# Patient Record
Sex: FEMALE | Race: WHITE | NOT HISPANIC OR LATINO | Employment: FULL TIME | URBAN - METROPOLITAN AREA
[De-identification: names, ages, dates, MRNs, and addresses within clinical notes are randomized per-mention and may not be internally consistent; named-entity substitution may affect disease eponyms.]

---

## 2017-03-16 ENCOUNTER — OFFICE VISIT (OUTPATIENT)
Dept: FAMILY MEDICINE | Facility: CLINIC | Age: 42
End: 2017-03-16
Payer: COMMERCIAL

## 2017-03-16 VITALS
OXYGEN SATURATION: 99 % | HEART RATE: 101 BPM | RESPIRATION RATE: 16 BRPM | SYSTOLIC BLOOD PRESSURE: 119 MMHG | HEIGHT: 63 IN | WEIGHT: 182 LBS | BODY MASS INDEX: 32.25 KG/M2 | DIASTOLIC BLOOD PRESSURE: 72 MMHG | TEMPERATURE: 98.4 F

## 2017-03-16 DIAGNOSIS — R07.0 THROAT PAIN: Primary | ICD-10-CM

## 2017-03-16 LAB
DEPRECATED S PYO AG THROAT QL EIA: NORMAL
MICRO REPORT STATUS: NORMAL
SPECIMEN SOURCE: NORMAL

## 2017-03-16 PROCEDURE — 87880 STREP A ASSAY W/OPTIC: CPT | Performed by: PHYSICIAN ASSISTANT

## 2017-03-16 PROCEDURE — 99213 OFFICE O/P EST LOW 20 MIN: CPT | Performed by: PHYSICIAN ASSISTANT

## 2017-03-16 PROCEDURE — 87081 CULTURE SCREEN ONLY: CPT | Performed by: PHYSICIAN ASSISTANT

## 2017-03-16 NOTE — NURSING NOTE
"Chief Complaint   Patient presents with     Ear Problem     Pharyngitis       Initial /72 (BP Location: Right arm, Patient Position: Chair, Cuff Size: Adult Large)  Pulse 101  Temp 98.4  F (36.9  C) (Oral)  Resp 16  Ht 5' 2.75\" (1.594 m)  Wt 182 lb (82.6 kg)  LMP 03/15/2017  SpO2 99%  Breastfeeding? No  BMI 32.5 kg/m2 Estimated body mass index is 32.5 kg/(m^2) as calculated from the following:    Height as of this encounter: 5' 2.75\" (1.594 m).    Weight as of this encounter: 182 lb (82.6 kg).  Medication Reconciliation: complete Lucinda Bartlett MA  Health Maintenance has been reviewed.       "

## 2017-03-16 NOTE — PROGRESS NOTES
SUBJECTIVE:                                                    Damaris Sarmiento is a 41 year old female who presents to clinic today for the following health issues:      Acute Illness   Acute illness concerns: Sore throat/ear pain  Onset: about 2-3 days for ears throat last night    Fever: no    Chills/Sweats: YES- chills    Headache (location?): YES    Sinus Pressure:YES    Conjunctivitis:  no    Ear Pain: YES: bilateral    Rhinorrhea: YES    Congestion: YES    Sore Throat: YES     Cough: no    Wheeze: YES    Decreased Appetite: no    Nausea: no    Vomiting: no    Diarrhea:  no    Dysuria/Freq.: no    Fatigue/Achiness: YES    Sick/Strep Exposure: YES     Therapies Tried and outcome:           Problem list and histories reviewed & adjusted, as indicated.  Additional history: as documented    Patient Active Problem List   Diagnosis     CARDIOVASCULAR SCREENING; LDL GOAL LESS THAN 160     GERD (gastroesophageal reflux disease)     Antiphospholipid antibody syndrome (H)     Past Surgical History   Procedure Laterality Date     C nonspecific procedure  12/2000     resection of first rib     C nonspecific procedure  1992     left foot surgery     Endoscopic sinus surgery  7/16/2014     Procedure: ENDOSCOPIC SINUS SURGERY;  Surgeon: Suzi Vieyra MD;  Location: [facility]     Turbinectomy  7/16/2014     Procedure: TURBINECTOMY;  Surgeon: Suzi Vieyra MD;  Location: [facility]     Orthopedic surgery         Social History   Substance Use Topics     Smoking status: Never Smoker     Smokeless tobacco: Never Used     Alcohol use 0.0 oz/week     0 Standard drinks or equivalent per week      Comment: rare     Family History   Problem Relation Age of Onset     CANCER Maternal Grandmother      lung     Cancer - colorectal Paternal Grandfather      Prostate Cancer Paternal Grandfather      Allergies Sister      EYE* Maternal Grandfather      glaucoma     Musculoskeletal Disorder Father      Paget's disease      Musculoskeletal  "Disorder Paternal Uncle      Paget's disease           Reviewed and updated as needed this visit by clinical staff  Tobacco  Allergies  Med Hx  Surg Hx  Fam Hx  Soc Hx      Reviewed and updated as needed this visit by Provider         ROS:  Constitutional, HEENT, cardiovascular, pulmonary, gi and gu systems are negative, except as otherwise noted.    OBJECTIVE:                                                    /72 (BP Location: Right arm, Patient Position: Chair, Cuff Size: Adult Large)  Pulse 101  Temp 98.4  F (36.9  C) (Oral)  Resp 16  Ht 5' 2.75\" (1.594 m)  Wt 182 lb (82.6 kg)  LMP 03/15/2017  SpO2 99%  Breastfeeding? No  BMI 32.5 kg/m2  Body mass index is 32.5 kg/(m^2).  GENERAL APPEARANCE: healthy, alert and no distress  HENT: ear canals and TM's normal and oral mucous membranes moist, posterior pharynx erythematous  RESP: lungs clear to auscultation - no rales, rhonchi or wheezes  CV: regular rates and rhythm, normal S1 S2, no S3 or S4 and no murmur, click or rub    Diagnostic Test Results:  Results for orders placed or performed in visit on 03/16/17 (from the past 24 hour(s))   Strep, Rapid Screen   Result Value Ref Range    Specimen Description Throat     Rapid Strep A Screen       NEGATIVE: No Group A streptococcal antigen detected by immunoassay, await   culture report.      Micro Report Status FINAL 03/16/2017         ASSESSMENT/PLAN:                                                            1. Throat pain  Supportive cares. Will treat given symptoms.   F/u as needed   - Strep, Rapid Screen  - Beta strep group A culture  - amoxicillin-clavulanate (AUGMENTIN) 875-125 MG per tablet; Take 1 tablet by mouth 2 times daily  Dispense: 20 tablet; Refill: 0        Rosio Malcolm PA-C, SILVIA  [facility]"

## 2017-03-16 NOTE — MR AVS SNAPSHOT
"              After Visit Summary   3/16/2017    Damaris Sarmiento    MRN: [id number]           Patient Information     Date Of Birth          1975        Visit Information        Provider Department      3/16/2017 3:00 PM Rosio Malcolm PA-C [facility]        Today's Diagnoses     Throat pain    -  1       Follow-ups after your visit        Who to contact     If you have questions or need follow up information about today's clinic visit or your schedule please contact [facility] directly at [phone].  Normal or non-critical lab and imaging results will be communicated to you by Medikidzhart, letter or phone within 4 business days after the clinic has received the results. If you do not hear from us within 7 days, please contact the clinic through Hiddenbedt or phone. If you have a critical or abnormal lab result, we will notify you by phone as soon as possible.  Submit refill requests through Singularu or call your pharmacy and they will forward the refill request to us. Please allow 3 business days for your refill to be completed.          Additional Information About Your Visit        MyChart Information     Singularu gives you secure access to your electronic health record. If you see a primary care provider, you can also send messages to your care team and make appointments. If you have questions, please call your primary care clinic.  If you do not have a primary care provider, please call [phone] and they will assist you.        Care EveryWhere ID     This is your Care EveryWhere ID. This could be used by other organizations to access your [place] medical records  [id number]        Your Vitals Were     Pulse Temperature Respirations Height Last Period Pulse Oximetry    101 98.4  F (36.9  C) (Oral) 16 5' 2.75\" (1.594 m) 03/15/2017 99%    Breastfeeding? BMI (Body Mass Index)                No 32.5 kg/m2           Blood Pressure from Last 3 Encounters:   03/16/17 " 119/72   10/28/16 149/89   08/26/16 102/64    Weight from Last 3 Encounters:   03/16/17 182 lb (82.6 kg)   10/28/16 184 lb (83.5 kg)   08/26/16 189 lb (85.7 kg)              We Performed the Following     Beta strep group A culture     Strep, Rapid Screen          Today's Medication Changes          These changes are accurate as of: 3/16/17  3:47 PM.  If you have any questions, ask your nurse or doctor.               Start taking these medicines.        Dose/Directions    amoxicillin-clavulanate 875-125 MG per tablet   Commonly known as:  AUGMENTIN   Used for:  Throat pain   Started by:  Rosio Malcolm PA-C        Dose:  1 tablet   Take 1 tablet by mouth 2 times daily   Quantity:  20 tablet   Refills:  0            Where to get your medicines      These medications were sent to [facility] [address] [place] [address] AT [address] [address]    Hours:  24-hours Phone:  [phone]     amoxicillin-clavulanate 875-125 MG per tablet                Primary Care Provider Office Phone # Fax #    Reymundo Laureano [phone]       [address] [address]        Thank you!     Thank you for choosing [facility]  for your care. Our goal is always to provide you with excellent care. Hearing back from our patients is one way we can continue to improve our services. Please take a few minutes to complete the written survey that you may receive in the mail after your visit with us. Thank you!             Your Updated Medication List - Protect others around you: Learn how to safely use, store and throw away your medicines at www.disposemymeds.org.          This list is accurate as of: 3/16/17  3:47 PM.  Always use your most recent med list.                   Brand Name Dispense Instructions for use    ALAWAY 0.025 % Soln ophthalmic solution   Generic drug:  ketotifen       1 drop every 6 hours as needed.       amoxicillin-clavulanate 875-125 MG per tablet    AUGMENTIN    20 tablet    Take 1 tablet by mouth 2 times daily       ascorbic acid 500 MG tablet    VITAMIN C     Take  by mouth daily.       budesonide 32 MCG/ACT spray    RINOCORT AQUA     Spray 1 spray into both nostrils daily       CITRACAL OR          CLARITIN 10 MG tablet   Generic drug:  loratadine      1 TABLET DAILY       gentamicin 0.008% in 1000ml 0.9% NaCl Soln nasal irrigation    GARAMYCIN         PRENATAL VIT-FE SULFATE-FA-DHA PO          SUDAFED PO          vitamin D 2000 UNITS tablet      Take 1 tablet by mouth daily.

## 2017-03-18 LAB
BACTERIA SPEC CULT: NORMAL
MICRO REPORT STATUS: NORMAL
SPECIMEN SOURCE: NORMAL

## 2017-07-28 ENCOUNTER — TRANSFERRED RECORDS (OUTPATIENT)
Dept: HEALTH INFORMATION MANAGEMENT | Facility: CLINIC | Age: 42
End: 2017-07-28

## 2017-08-28 ENCOUNTER — TRANSFERRED RECORDS (OUTPATIENT)
Dept: HEALTH INFORMATION MANAGEMENT | Facility: CLINIC | Age: 42
End: 2017-08-28

## 2017-09-20 ENCOUNTER — OFFICE VISIT (OUTPATIENT)
Dept: OBGYN | Facility: CLINIC | Age: 42
End: 2017-09-20
Payer: COMMERCIAL

## 2017-09-20 VITALS
HEART RATE: 76 BPM | SYSTOLIC BLOOD PRESSURE: 100 MMHG | HEIGHT: 63 IN | WEIGHT: 181.3 LBS | BODY MASS INDEX: 32.12 KG/M2 | DIASTOLIC BLOOD PRESSURE: 60 MMHG

## 2017-09-20 DIAGNOSIS — Z01.419 ENCOUNTER FOR GYNECOLOGICAL EXAMINATION WITHOUT ABNORMAL FINDING: Primary | ICD-10-CM

## 2017-09-20 DIAGNOSIS — Z13.29 THYROID DISORDER SCREEN: ICD-10-CM

## 2017-09-20 DIAGNOSIS — E28.2 PCOS (POLYCYSTIC OVARIAN SYNDROME): ICD-10-CM

## 2017-09-20 LAB — CORTIS SERPL-MCNC: 4.1 UG/DL (ref 4–22)

## 2017-09-20 PROCEDURE — 82533 TOTAL CORTISOL: CPT | Performed by: OBSTETRICS & GYNECOLOGY

## 2017-09-20 PROCEDURE — 82947 ASSAY GLUCOSE BLOOD QUANT: CPT | Performed by: OBSTETRICS & GYNECOLOGY

## 2017-09-20 PROCEDURE — 84443 ASSAY THYROID STIM HORMONE: CPT | Performed by: OBSTETRICS & GYNECOLOGY

## 2017-09-20 PROCEDURE — 99396 PREV VISIT EST AGE 40-64: CPT | Performed by: OBSTETRICS & GYNECOLOGY

## 2017-09-20 PROCEDURE — 80061 LIPID PANEL: CPT | Performed by: OBSTETRICS & GYNECOLOGY

## 2017-09-20 PROCEDURE — 84439 ASSAY OF FREE THYROXINE: CPT | Performed by: OBSTETRICS & GYNECOLOGY

## 2017-09-20 PROCEDURE — 36415 COLL VENOUS BLD VENIPUNCTURE: CPT | Performed by: OBSTETRICS & GYNECOLOGY

## 2017-09-20 RX ORDER — POLYETHYLENE GLYCOL 3350 17 G/17G
1 POWDER, FOR SOLUTION ORAL DAILY
COMMUNITY

## 2017-09-20 NOTE — MR AVS SNAPSHOT
After Visit Summary   9/20/2017    Damaris Sarmiento    MRN: [id number]           Patient Information     Date Of Birth          1975        Visit Information        Provider Department      9/20/2017 10:00 AM Mari Figueroa MD [facility]        Today's Diagnoses     Encounter for gynecological examination without abnormal finding    -  1    PCOS (polycystic ovarian syndrome)          Care Instructions    PREVENTIVE HEALTH RECOMMENDATIONS:     Get a Pap test each year. If you have 3 normal tests in a row, you may have the test every 5 years. You do not need a Pap test if you've had a hysterectomy (removal of uterus) and have not had cancer.    You should be tested each year for STDs (sexually transmitted diseases), if you're at risk.     Ask your doctor if you should have a mammogram.    Have a colonoscopy (test for colon cancer) if someone in your family has had colon cancer or polyps before age 50.     Have a cholesterol test every 5 years.     Have a diabetes test (fasting glucose) after age 45. If you are at risk for diabetes, you should have this test every 3 years.    Vaccines: Get a flu shot each year. Get a tetanus shot every 10 years.     Eat at least 5 servings of fruits and vegetables daily.    Eat whole-grain bread, whole-wheat pasta and brown rice instead of white grains and rice.    For bone health:  Eat calcium-rich foods or take calcium pills (500 to 600 mg) twice a day with food. Also take vitamin D (1000 IUs) each day.     Exercise for at least 150 minutes a week (an average of 30 minutes a day, 5 days of the week). This will help you control your weight and prevent disease.    Limit alcohol to one drink per day.    No smoking.     Wear sunscreen to prevent skin cancer.     See your dentist twice a year for an exam and cleaning.          Follow-ups after your visit        Who to contact     If you have questions or need follow up information about today's  "clinic visit or your schedule please contact [facility] directly at [phone].  Normal or non-critical lab and imaging results will be communicated to you by MyChart, letter or phone within 4 business days after the clinic has received the results. If you do not hear from us within 7 days, please contact the clinic through bLifehart or phone. If you have a critical or abnormal lab result, we will notify you by phone as soon as possible.  Submit refill requests through Bay[employer] Impact or call your pharmacy and they will forward the refill request to us. Please allow 3 business days for your refill to be completed.          Additional Information About Your Visit        bLifeharCallerAds Limited Information     [employer] gives you secure access to your electronic health record. If you see a primary care provider, you can also send messages to your care team and make appointments. If you have questions, please call your primary care clinic.  If you do not have a primary care provider, please call [phone] and they will assist you.        Care EveryWhere ID     This is your Care EveryWhere ID. This could be used by other organizations to access your [place] medical records  [id number]        Your Vitals Were     Pulse Height Last Period Breastfeeding? BMI (Body Mass Index)       76 5' 2.75\" (1.594 m) 09/03/2017 (Approximate) No 32.37 kg/m2        Blood Pressure from Last 3 Encounters:   09/20/17 100/60   03/16/17 119/72   10/28/16 149/89    Weight from Last 3 Encounters:   09/20/17 181 lb 4.8 oz (82.2 kg)   03/16/17 182 lb (82.6 kg)   10/28/16 184 lb (83.5 kg)              We Performed the Following     Cortisol     Glucose     Lipid Profile     T4 FREE     TSH          Today's Medication Changes          These changes are accurate as of: 9/20/17 11:59 PM.  If you have any questions, ask your nurse or doctor.               Stop taking these medicines if you haven't already. Please contact your care team if you have " questions.     PRENATAL VIT-FE SULFATE-FA-DHA PO   Stopped by:  Mari Figueroa MD                    Primary Care Provider Office Phone # Fax #    Reymundo Laureano [phone] [address]  [facility] [address]        Equal Access to Services     [facility]: Hadii aad ku hadasho Soomaali, waaxda luqadaha, qaybta kaalmada adeegyada, waxay idiin hayaan adececile leung layannickn ah. So [facility] [phone].    ATENCIÓN: Si habla español, tiene a walsh disposición servicios gratuitos de asistencia lingüística. [facility] [phone].    We comply with applicable federal civil rights laws and [place] laws. We do not discriminate on the basis of race, color, national origin, age, disability sex, sexual orientation or gender identity.            Thank you!     Thank you for choosing [facility]  for your care. Our goal is always to provide you with excellent care. Hearing back from our patients is one way we can continue to improve our services. Please take a few minutes to complete the written survey that you may receive in the mail after your visit with us. Thank you!             Your Updated Medication List - Protect others around you: Learn how to safely use, store and throw away your medicines at www.disposemymeds.org.          This list is accurate as of: 9/20/17 11:59 PM.  Always use your most recent med list.                   Brand Name Dispense Instructions for use Diagnosis    ALAWAY 0.025 % Soln ophthalmic solution   Generic drug:  ketotifen      1 drop every 6 hours as needed.        ascorbic acid 500 MG tablet    VITAMIN C     Take  by mouth daily.        budesonide 32 MCG/ACT spray    RINOCORT AQUA     Spray 1 spray into both nostrils daily        CITRUCEL 500 MG Tabs tablet   Generic drug:  methylcellulose      Take 100 mg by mouth daily        CLARITIN 10 MG tablet   Generic drug:  loratadine      1 TABLET DAILY    Allergic rhinitis, cause unspecified       MIRALAX  powder   Generic drug:  polyethylene glycol      Take 1 capful by mouth daily        SUDAFED PO           vitamin D 2000 UNITS tablet      Take 1 tablet by mouth daily.

## 2017-09-20 NOTE — NURSING NOTE
"Chief Complaint   Patient presents with     Gyn Exam   Has papers for [facility] [employer] through employer  Fasting  Flu shot  Body Composition Scale Results    Body Fat : 47.8%  Visceral Fat: 9  BMI: 32.6  Skeletal Muscle: 22.8%      Initial /60 (BP Location: Left arm, Patient Position: Sitting, Cuff Size: Adult Large)  Pulse 76  Ht 5' 2.75\" (1.594 m)  Wt 181 lb 4.8 oz (82.2 kg)  LMP 2017 (Approximate)  Breastfeeding? No  BMI 32.37 kg/m2 Estimated body mass index is 32.37 kg/(m^2) as calculated from the following:    Height as of this encounter: 5' 2.75\" (1.594 m).    Weight as of this encounter: 181 lb 4.8 oz (82.2 kg).  BP completed using cuff size: large        The following HM Due: Vaccinations: flu shot      The following patient reported/Care Every where data was sent to:  P ABSTRACT QUALITY INITIATIVES [[id number]]  NA     patient has appointment for today             "

## 2017-09-20 NOTE — PROGRESS NOTES
HPI: Damaris Sarmiento is a 42 year old female   Obstetric History       T0      L0     SAB0   TAB0   Ectopic0   Multiple0   Live Births0     Patient's last menstrual period was 2017 (approximate). who presents for annual health maintence.      Patient reports severe, intermittent pelvic spasms near the cervix which occur about every other month and typically last 30 seconds.  She has not noticed any aggravating factors or any association with bloating, gas, or other GI issues.  Denies bowel or bladder changes.     Patient states that she was diagnosed with PCOS at [facility].  She states she had symptoms of fatigue, boils, abnormal hair growth, and hair loss.  She had a h/o irregular periods but notes that they have been more regular lately.  Patient states that she has been working on weight loss to improve her symptoms.  She reports 17 pound weight loss with calorie restriction, targeting 1300 Calories/day.       Patient reports regular menses; moderate flow.    Exercise/Eating: Continuing to exercise regularly, walking on the treadmill with interval jogging; strength training 1-2x/wk.     PGYNH: denies history of abnormal paps, denies history of STDs. H/o PCOS.     Past Medical History:   Diagnosis Date     CARDIOVASCULAR SCREENING; LDL GOAL LESS THAN 160 10/31/2010     GERD (gastroesophageal reflux disease) 10/4/2012     Polycystic ovarian syndrome      PONV (postoperative nausea and vomiting)      RW ALLERGIES/IMMUNOLOGY (ABSTRACTED)      Sinusitis      Subclavian vein thrombosis, right (H)      Past Surgical History:   Procedure Laterality Date     C NONSPECIFIC PROCEDURE  2000    resection of first rib     C NONSPECIFIC PROCEDURE      left foot surgery     ENDOSCOPIC SINUS SURGERY  2014    Procedure: ENDOSCOPIC SINUS SURGERY;  Surgeon: Suzi Vieyra MD;  Location: [facility]     ORTHOPEDIC SURGERY       TURBINECTOMY  2014    Procedure: TURBINECTOMY;  Surgeon: Suzi Vieyra  MD [place];  [facility]: [facility]     Family History   Problem Relation Age of Onset     Musculoskeletal Disorder Father      Paget's disease      EYE* Maternal Grandfather      glaucoma     CANCER Maternal Grandmother      lung     Cancer - colorectal Paternal Grandfather      Prostate Cancer Paternal Grandfather      Allergies Sister      Other - See Comments Sister      endometriosis     Musculoskeletal Disorder Paternal Uncle      Paget's disease     Social History     Social History     Marital status:      Spouse name: N/A     Number of children: N/A     Years of education: N/A     Occupational History     Not on file.     Social History Main Topics     Smoking status: Never Smoker     Smokeless tobacco: Never Used     Alcohol use 0.0 oz/week     0 Standard drinks or equivalent per week      Comment: rare     Drug use: No     Sexual activity: Yes     Partners: Male     Birth control/ protection:       Comment: vasectomy     Other Topics Concern     Not on file     Social History Narrative       Review of Systems:   CONSTITUTIONAL: NEGATIVE for fever, chills, change in weight  INTEGUMENTARY/SKIN: NEGATIVE for worrisome rashes, moles or lesions  RESP: NEGATIVE for significant cough or SOB  BREAST: NEGATIVE for masses, tenderness or discharge  CV: NEGATIVE for chest pain, palpitations or peripheral edema  GI: NEGATIVE for abdominal pain, heartburn, or change in bowel habits, nausea, emesis  : negative for dysuria, vaginal discharge and bleeding  MUSCULOSKELETAL: NEGATIVE for significant arthralgias or myalgia  NEURO: NEGATIVE for weakness, dizziness or paresthesias  PSYCHIATRIC: NEGATIVE for changes in mood or affect     This document serves as a record of the services and decisions personally performed and made by Mari Figueroa MD. It was created on her behalf by Suze Justice, a trained medical scribe. The creation of this document is based the provider's statements to the medical scribe.  Suze Justice  "September 20, 2017 11:02 AM       /60 (BP Location: Left arm, Patient Position: Sitting, Cuff Size: Adult Large)  Pulse 76  Ht 1.594 m (5' 2.75\")  Wt 82.2 kg (181 lb 4.8 oz)  LMP 09/03/2017 (Approximate)  Breastfeeding? No  BMI 32.37 kg/m2    Physical exam:  GENERAL APPEARANCE: healthy, alert and no distress  NECK: no adenopathy, no asymmetry, masses, or scars and thyroid normal to palpation  RESP: lungs clear to auscultation - no rales, rhonchi or wheezes  BREAST: no tenderness or nipple discharge and no palpable axillary masses or adenopathy; <1cm lump on left breast at 11 o'clock 6 cm from areola margin; stable  CV: regular rates and rhythm, normal S1 S2, no S3 or S4 and no murmur, click or rub -  ABDOMEN:  soft, nontender, no HSM or masses and bowel sounds normal  PELVIC:   Vulva: normal external female genitalia,   Urethra meatus: normal, non-tender  Vagina: normal vaginal mucosa, rugated, no lesions, normal physiologic discharge.    Cervix: nulliparous cervix, no lesions.    Uterus: Normal contour, size, position; non-tender  Adnexa: No adnexal masses palpated, non-tender  Perineum: normal, no lesions, intact  Anus: normal, no lesions, hemorrhoids          Assessment/Plan:  (Z01.419) Encounter for gynecological examination without abnormal finding  (primary encounter diagnosis)  Comment: Encouraged continuing healthy eating and exercise including strength training.   Plan: Lipid Profile, Glucose, Cortisol, TSH, T4 FREE          (E28.2) PCOS (polycystic ovarian syndrome)  Comment: ultrasound to further evaluate PCOS  Plan: US Pelvic Complete with Transvaginal            PE: reviewed health maintenance including diet, regular exercise and annual exams    The information in this document, created by the medical scribe for me, accurately reflects the services I personally performed and the decisions made by me. I have reviewed and approved this document for accuracy prior to leaving the patient care " area.  9/20/2017 11:02 AM      Mari Figueroa M.D.

## 2017-09-20 NOTE — LETTER
[facility] [address] [place]  [address]  [facility] [address]  [phone]        August 28, 2018    Damaris Sarmiento  [address]  [facility] [address]              Dear Damaris Sarmiento    This is to remind you that your NON-FASTING LAB is due.    You may call our office at [phone] to schedule an appointment.    Please disregard this notice if you have already had your labs drawn or made an appointment.        Sincerely,        Mari Figueroa MD

## 2017-09-20 NOTE — PATIENT INSTRUCTIONS
PREVENTIVE HEALTH RECOMMENDATIONS:     Get a Pap test each year. If you have 3 normal tests in a row, you may have the test every 5 years. You do not need a Pap test if you've had a hysterectomy (removal of uterus) and have not had cancer.    You should be tested each year for STDs (sexually transmitted diseases), if you're at risk.     Ask your doctor if you should have a mammogram.    Have a colonoscopy (test for colon cancer) if someone in your family has had colon cancer or polyps before age 50.     Have a cholesterol test every 5 years.     Have a diabetes test (fasting glucose) after age 45. If you are at risk for diabetes, you should have this test every 3 years.    Vaccines: Get a flu shot each year. Get a tetanus shot every 10 years.     Eat at least 5 servings of fruits and vegetables daily.    Eat whole-grain bread, whole-wheat pasta and brown rice instead of white grains and rice.    For bone health:  Eat calcium-rich foods or take calcium pills (500 to 600 mg) twice a day with food. Also take vitamin D (1000 IUs) each day.     Exercise for at least 150 minutes a week (an average of 30 minutes a day, 5 days of the week). This will help you control your weight and prevent disease.    Limit alcohol to one drink per day.    No smoking.     Wear sunscreen to prevent skin cancer.     See your dentist twice a year for an exam and cleaning.

## 2017-09-20 NOTE — LETTER
[place] [address]  [facility] [place] [id number] [phone]        March 13, 2018    Damaris Sarmiento  [address]  [facility] [address]              Dear Damaris Sarmiento    This is to remind you that your NON-FASTING LAB is due.    You may call our office at [phone] to schedule an appointment.    Please disregard this notice if you have already had your labs drawn or made an appointment.        Sincerely,        Mari Figueroa MD

## 2017-09-21 LAB
CHOLEST SERPL-MCNC: 178 MG/DL
GLUCOSE SERPL-MCNC: 83 MG/DL (ref 70–99)
HDLC SERPL-MCNC: 71 MG/DL
LDLC SERPL CALC-MCNC: 93 MG/DL
NONHDLC SERPL-MCNC: 107 MG/DL
T4 FREE SERPL-MCNC: 2.66 NG/DL (ref 0.76–1.46)
TRIGL SERPL-MCNC: 69 MG/DL
TSH SERPL DL<=0.005 MIU/L-ACNC: 1.16 MU/L (ref 0.4–4)

## 2017-09-22 ENCOUNTER — MYC MEDICAL ADVICE (OUTPATIENT)
Dept: OBGYN | Facility: CLINIC | Age: 42
End: 2017-09-22

## 2017-09-28 ENCOUNTER — MYC MEDICAL ADVICE (OUTPATIENT)
Dept: OBGYN | Facility: CLINIC | Age: 42
End: 2017-09-28

## 2017-09-29 ENCOUNTER — MYC MEDICAL ADVICE (OUTPATIENT)
Dept: OBGYN | Facility: CLINIC | Age: 42
End: 2017-09-29

## 2017-09-29 NOTE — TELEPHONE ENCOUNTER
See GoNogging message.    Your note from results:    Mp Ocampo,     Your lab results indicated an elevated thyroid hormone (free T4), although the screening thyroid test was normal (TSH).    Can you repeat this test for me?   I would like to check a free T3 level as well.       Please let me know if there were any other medication changes that we did not note at your last visit as well.     Please schedule a lab-only appointment and you can have the labs drawn.     Please let me know if you have any questions.     Dr. Carolina BAGLEY RSAE.  [facility]

## 2017-09-29 NOTE — TELEPHONE ENCOUNTER
Please fax a copy of the thyroid labs to Dr. Johnson's office and ask what additional testing is recommended.   If they would like us to order to do at [place], let me know about the specific labs they suggest.   Thank you.

## 2017-09-29 NOTE — TELEPHONE ENCOUNTER
Faxed results.  Added note to let us know of any additional needed labs.    Cely BAGLEY R.N.  [facility]

## 2017-10-03 ENCOUNTER — HOSPITAL ENCOUNTER (OUTPATIENT)
Dept: ULTRASOUND IMAGING | Facility: CLINIC | Age: 42
Discharge: HOME | End: 2017-10-03
Attending: OBSTETRICS & GYNECOLOGY | Admitting: OBSTETRICS & GYNECOLOGY
Payer: COMMERCIAL

## 2017-10-03 DIAGNOSIS — E28.2 PCOS (POLYCYSTIC OVARIAN SYNDROME): ICD-10-CM

## 2017-10-03 PROCEDURE — 76830 TRANSVAGINAL US NON-OB: CPT

## 2018-02-06 ENCOUNTER — APPOINTMENT (OUTPATIENT)
Dept: CT IMAGING | Facility: CLINIC | Age: 43
End: 2018-02-06
Attending: PHYSICIAN ASSISTANT
Payer: COMMERCIAL

## 2018-02-06 ENCOUNTER — HOSPITAL ENCOUNTER (EMERGENCY)
Facility: CLINIC | Age: 43
Discharge: HOME | End: 2018-02-06
Attending: EMERGENCY MEDICINE | Admitting: EMERGENCY MEDICINE
Payer: COMMERCIAL

## 2018-02-06 VITALS
OXYGEN SATURATION: 96 % | RESPIRATION RATE: 17 BRPM | HEART RATE: 100 BPM | HEIGHT: 63 IN | WEIGHT: 189.82 LBS | SYSTOLIC BLOOD PRESSURE: 115 MMHG | BODY MASS INDEX: 33.63 KG/M2 | TEMPERATURE: 98.7 F | DIASTOLIC BLOOD PRESSURE: 71 MMHG

## 2018-02-06 DIAGNOSIS — R07.9 ACUTE CHEST PAIN: ICD-10-CM

## 2018-02-06 LAB
ALBUMIN SERPL-MCNC: 3.6 G/DL (ref 3.4–5)
ALP SERPL-CCNC: 54 U/L (ref 40–150)
ALT SERPL W P-5'-P-CCNC: 26 U/L (ref 0–50)
ANION GAP SERPL CALCULATED.3IONS-SCNC: 8 MMOL/L (ref 3–14)
AST SERPL W P-5'-P-CCNC: 15 U/L (ref 0–45)
BASOPHILS # BLD AUTO: 0.1 10E9/L (ref 0–0.2)
BASOPHILS NFR BLD AUTO: 0.5 %
BILIRUB SERPL-MCNC: 0.3 MG/DL (ref 0.2–1.3)
BUN SERPL-MCNC: 10 MG/DL (ref 7–30)
CALCIUM SERPL-MCNC: 8.6 MG/DL (ref 8.5–10.1)
CHLORIDE SERPL-SCNC: 102 MMOL/L (ref 94–109)
CO2 SERPL-SCNC: 27 MMOL/L (ref 20–32)
CREAT SERPL-MCNC: 0.66 MG/DL (ref 0.52–1.04)
D DIMER PPP FEU-MCNC: 0.3 UG/ML FEU (ref 0–0.5)
DIFFERENTIAL METHOD BLD: ABNORMAL
EOSINOPHIL # BLD AUTO: 0.4 10E9/L (ref 0–0.7)
EOSINOPHIL NFR BLD AUTO: 3.4 %
ERYTHROCYTE [DISTWIDTH] IN BLOOD BY AUTOMATED COUNT: 13.3 % (ref 10–15)
GFR SERPL CREATININE-BSD FRML MDRD: >90 ML/MIN/1.7M2
GLUCOSE SERPL-MCNC: 99 MG/DL (ref 70–99)
HCT VFR BLD AUTO: 36.8 % (ref 35–47)
HGB BLD-MCNC: 12.7 G/DL (ref 11.7–15.7)
IMM GRANULOCYTES # BLD: 0 10E9/L (ref 0–0.4)
IMM GRANULOCYTES NFR BLD: 0.2 %
LIPASE SERPL-CCNC: 119 U/L (ref 73–393)
LYMPHOCYTES # BLD AUTO: 4.7 10E9/L (ref 0.8–5.3)
LYMPHOCYTES NFR BLD AUTO: 36.5 %
MCH RBC QN AUTO: 29.5 PG (ref 26.5–33)
MCHC RBC AUTO-ENTMCNC: 34.5 G/DL (ref 31.5–36.5)
MCV RBC AUTO: 85 FL (ref 78–100)
MONOCYTES # BLD AUTO: 0.9 10E9/L (ref 0–1.3)
MONOCYTES NFR BLD AUTO: 7.1 %
NEUTROPHILS # BLD AUTO: 6.7 10E9/L (ref 1.6–8.3)
NEUTROPHILS NFR BLD AUTO: 52.3 %
NRBC # BLD AUTO: 0 10*3/UL
NRBC BLD AUTO-RTO: 0 /100
PLATELET # BLD AUTO: 330 10E9/L (ref 150–450)
POTASSIUM SERPL-SCNC: 3.4 MMOL/L (ref 3.4–5.3)
PROT SERPL-MCNC: 7.8 G/DL (ref 6.8–8.8)
RBC # BLD AUTO: 4.31 10E12/L (ref 3.8–5.2)
SODIUM SERPL-SCNC: 137 MMOL/L (ref 133–144)
TROPONIN I SERPL-MCNC: <0.015 UG/L (ref 0–0.04)
WBC # BLD AUTO: 12.7 10E9/L (ref 4–11)

## 2018-02-06 PROCEDURE — 85025 COMPLETE CBC W/AUTO DIFF WBC: CPT | Performed by: EMERGENCY MEDICINE

## 2018-02-06 PROCEDURE — 93005 ELECTROCARDIOGRAM TRACING: CPT

## 2018-02-06 PROCEDURE — 85379 FIBRIN DEGRADATION QUANT: CPT | Performed by: EMERGENCY MEDICINE

## 2018-02-06 PROCEDURE — 84484 ASSAY OF TROPONIN QUANT: CPT | Performed by: EMERGENCY MEDICINE

## 2018-02-06 PROCEDURE — 99285 EMERGENCY DEPT VISIT HI MDM: CPT | Mod: 25

## 2018-02-06 PROCEDURE — 80053 COMPREHEN METABOLIC PANEL: CPT | Performed by: EMERGENCY MEDICINE

## 2018-02-06 PROCEDURE — 25000128 H RX IP 250 OP 636: Performed by: EMERGENCY MEDICINE

## 2018-02-06 PROCEDURE — 25000125 ZZHC RX 250: Performed by: EMERGENCY MEDICINE

## 2018-02-06 PROCEDURE — 83690 ASSAY OF LIPASE: CPT | Performed by: EMERGENCY MEDICINE

## 2018-02-06 PROCEDURE — 71260 CT THORAX DX C+: CPT

## 2018-02-06 RX ORDER — IOPAMIDOL 755 MG/ML
70 INJECTION, SOLUTION INTRAVASCULAR ONCE
Status: COMPLETED | OUTPATIENT
Start: 2018-02-06 | End: 2018-02-06

## 2018-02-06 RX ADMIN — SODIUM CHLORIDE, PRESERVATIVE FREE 94 ML: 5 INJECTION INTRAVENOUS at 21:42

## 2018-02-06 RX ADMIN — IOPAMIDOL 70 ML: 755 INJECTION, SOLUTION INTRAVENOUS at 21:42

## 2018-02-06 ASSESSMENT — ENCOUNTER SYMPTOMS
VOMITING: 1
CHEST TIGHTNESS: 1
SHORTNESS OF BREATH: 1

## 2018-02-06 NOTE — ED AVS SNAPSHOT
Emergency Department    [address] [place] [address]    Phone:  [phone]    Fax:  [phone]                                       Damaris Sarmiento   MRN: [id number]    Department:   Emergency Department   Date of Visit:  2/6/2018           After Visit Summary Signature Page     I have received my discharge instructions, and my questions have been answered. I have discussed any challenges I see with this plan with the nurse or doctor.    ..........................................................................................................................................  Patient/Patient Representative Signature      ..........................................................................................................................................  Patient Representative Print Name and Relationship to Patient    ..................................................               ................................................  Date                                            Time    ..........................................................................................................................................  Reviewed by Signature/Title    ...................................................              ..............................................  Date                                                            Time

## 2018-02-06 NOTE — ED AVS SNAPSHOT
Emergency Department    [address] [facility] [address]    Phone:  [phone]    Fax:  [phone]                                       Damaris Sarmiento   MRN: [id number]    Department:   Emergency Department   Date of Visit:  2/6/2018           Patient Information     Date Of Birth          1975        Your diagnoses for this visit were:     Acute chest pain        You were seen by Ruddy Son MD.      Follow-up Information     Follow up with [facility], [facility] In 2 days.    Contact information:    [address] [facility] [id number] [phone]          Follow up with  Emergency Department.    Specialty:  EMERGENCY MEDICINE    Why:  If symptoms worsen    Contact information:    [address] [facility] [address] [phone]        Discharge Instructions       CHEST PAIN  Based on your visit today, the exact cause of your chest pain is not certain. Your condition does not seem serious and your pain does not appear to be coming from your heart. However, sometimes the signs of a serious problem take more time to appear. Therefore, please watch for the warning signs listed below.  HOME CARE:  1. Rest today and avoid strenuous activity.  2. Take any prescribed medicine as directed.  FOLLOW UP with your doctor in 1-3 days.   GET PROMPT MEDICAL ATTENTION if any of the following occur:    A change in the type of pain: if it feels different, becomes more severe, lasts longer, or begins to spread into your shoulder, arm, neck, jaw or back    Shortness of breath or increased pain with breathing    Cough with blood or dark colored sputum (phlegm)    Weakness, dizziness, or fainting    Fever over 101  F (38.3  C)    Swelling, pain or redness in one leg      24 Hour Appointment Hotline       To make an appointment at any [facility], call [phone]). If you don't have a family doctor or clinic, we will help you find one. [facility]  are conveniently located to serve the needs of you and your family.             Review of your medicines      Our records show that you are taking the medicines listed below. If these are incorrect, please call your family doctor or clinic.        Dose / Directions Last dose taken    BABY ASPIRIN PO        Refills:  0        budesonide 32 MCG/ACT spray   Commonly known as:  RINOCORT AQUA   Dose:  1 spray        Spray 1 spray into both nostrils daily   Refills:  0        CITRUCEL 500 MG Tabs tablet   Dose:  100 mg   Generic drug:  methylcellulose        Take 100 mg by mouth daily   Refills:  0        CLARITIN 10 MG tablet   Generic drug:  loratadine        1 TABLET DAILY   Refills:  0        MIRALAX powder   Dose:  1 capful   Generic drug:  polyethylene glycol        Take 1 capful by mouth daily   Refills:  0        SUDAFED PO        Refills:  0                Procedures and tests performed during your visit     CBC with platelets differential    CT Chest Pulmonary Embolism w Contrast    Comprehensive metabolic panel    D dimer quantitative    EKG 12 lead    EKG 12-lead, tracing only    Lipase    Peripheral IV catheter    Troponin I      Orders Needing Specimen Collection     None      Pending Results     No orders found from 2/4/2018 to 2/7/2018.            Pending Culture Results     No orders found from 2/4/2018 to 2/7/2018.            Pending Results Instructions     If you had any lab results that were not finalized at the time of your Discharge, you can call the ED Lab Result RN at [phone]. You will be contacted by this team for any positive Lab results or changes in treatment. The nurses are available 7 days a week from 10A to 6:30P.  You can leave a message 24 hours per day and they will return your call.        Test Results From Your Hospital Stay        2/6/2018  9:08 PM      Component Results     Component Value Ref Range & Units Status    WBC 12.7 (H) 4.0 - 11.0 10e9/L Final    RBC Count 4.31 3.8 -  5.2 10e12/L Final    Hemoglobin 12.7 11.7 - 15.7 g/dL Final    Hematocrit 36.8 35.0 - 47.0 % Final    MCV 85 78 - 100 fl Final    MCH 29.5 26.5 - 33.0 pg Final    MCHC 34.5 31.5 - 36.5 g/dL Final    RDW 13.3 10.0 - 15.0 % Final    Platelet Count 330 150 - 450 10e9/L Final    Diff Method Automated Method  Final    % Neutrophils 52.3 % Final    % Lymphocytes 36.5 % Final    % Monocytes 7.1 % Final    % Eosinophils 3.4 % Final    % Basophils 0.5 % Final    % Immature Granulocytes 0.2 % Final    Nucleated RBCs 0 0 /100 Final    Absolute Neutrophil 6.7 1.6 - 8.3 10e9/L Final    Absolute Lymphocytes 4.7 0.8 - 5.3 10e9/L Final    Absolute Monocytes 0.9 0.0 - 1.3 10e9/L Final    Absolute Eosinophils 0.4 0.0 - 0.7 10e9/L Final    Absolute Basophils 0.1 0.0 - 0.2 10e9/L Final    Abs Immature Granulocytes 0.0 0 - 0.4 10e9/L Final    Absolute Nucleated RBC 0.0  Final         2/6/2018  9:41 PM      Component Results     Component Value Ref Range & Units Status    D Dimer 0.3 0.0 - 0.50 ug/ml FEU Final    This D-dimer assay is intended for use in conjunction with a clinical pretest   probability assessment model to exclude pulmonary embolism (PE) and deep   venous thrombosis (DVT) in outpatients suspected of PE or DVT. The cut-off   value is 0.5 ug/mL FEU.           2/6/2018  9:36 PM      Component Results     Component Value Ref Range & Units Status    Sodium 137 133 - 144 mmol/L Final    Potassium 3.4 3.4 - 5.3 mmol/L Final    Chloride 102 94 - 109 mmol/L Final    Carbon Dioxide 27 20 - 32 mmol/L Final    Anion Gap 8 3 - 14 mmol/L Final    Glucose 99 70 - 99 mg/dL Final    Urea Nitrogen 10 7 - 30 mg/dL Final    Creatinine 0.66 0.52 - 1.04 mg/dL Final    GFR Estimate >90 >60 mL/min/1.7m2 Final    Non  GFR Calc    GFR Estimate If Black >90 >60 mL/min/1.7m2 Final    African American GFR Calc    Calcium 8.6 8.5 - 10.1 mg/dL Final    Bilirubin Total 0.3 0.2 - 1.3 mg/dL Final    Albumin 3.6 3.4 - 5.0 g/dL Final     Protein Total 7.8 6.8 - 8.8 g/dL Final    Alkaline Phosphatase 54 40 - 150 U/L Final    ALT 26 0 - 50 U/L Final    AST 15 0 - 45 U/L Final         2/6/2018  9:31 PM      Component Results     Component Value Ref Range & Units Status    Lipase 119 73 - 393 U/L Final         2/6/2018  9:36 PM      Component Results     Component Value Ref Range & Units Status    Troponin I ES <0.015 0.000 - 0.045 ug/L Final    The 99th percentile for upper reference range is 0.045 ug/L.  Troponin values   in the range of 0.045 - 0.120 ug/L may be associated with risks of adverse   clinical events.           2/6/2018 10:08 PM      Narrative     CT CHEST PULMONARY EMBOLISM W CONTRAST 2/6/2018 9:45 PM     HISTORY: Chest pain, shortness of breath, history of DVT.     COMPARISON: CT chest 4/10/2006.    TECHNIQUE: Thin section axial images are performed from the thoracic  inlet to the lung bases utilizing 70 mL of Isovue 370 IV contrast  without adverse event. Coronal reformatted images are also generated.  Radiation dose for this scan was reduced using automated exposure  control, adjustment of the mA and/or kV according to patient size, or  iterative reconstruction technique.    FINDINGS:     Chest: The lungs are clear. No infiltrate or consolidation. No pleural  or pericardial fluid. Heart is normal in size. Esophagus is within  normal limits. No enlarged lymph nodes. No evidence of pulmonary  embolism. Thoracic aorta is unremarkable. Limited images upper abdomen  are within normal limits. Bone window examination is unremarkable.        Impression     IMPRESSION:  1. No evidence of pulmonary embolism. Thoracic aorta is unremarkable.  2. Lungs are clear. No enlarged lymph nodes.    INGA HILL MD                Clinical Quality Measure: Blood Pressure Screening     Your blood pressure was checked while you were in the emergency department today. The last reading we obtained was  BP: 115/71 . Please read the guidelines below about what  these numbers mean and what you should do about them.  If your systolic blood pressure (the top number) is less than 120 and your diastolic blood pressure (the bottom number) is less than 80, then your blood pressure is normal. There is nothing more that you need to do about it.  If your systolic blood pressure (the top number) is 120-139 or your diastolic blood pressure (the bottom number) is 80-89, your blood pressure may be higher than it should be. You should have your blood pressure rechecked within a year by a primary care provider.  If your systolic blood pressure (the top number) is 140 or greater or your diastolic blood pressure (the bottom number) is 90 or greater, you may have high blood pressure. High blood pressure is treatable, but if left untreated over time it can put you at risk for heart attack, stroke, or kidney failure. You should have your blood pressure rechecked by a primary care provider within the next 4 weeks.  If your provider in the emergency department today gave you specific instructions to follow-up with your doctor or provider even sooner than that, you should follow that instruction and not wait for up to 4 weeks for your follow-up visit.        Thank you for choosing [facility]       Thank you for choosing [facility] for your care. Our goal is always to provide you with excellent care. Hearing back from our patients is one way we can continue to improve our services. Please take a few minutes to complete the written survey that you may receive in the mail after you visit with us. Thank you!        [employer] Information     [facility] gives you secure access to your electronic health record. If you see a primary care provider, you can also send messages to your care team and make appointments. If you have questions, please call your primary care clinic.  If you do not have a primary care provider, please call [phone] and they will assist you.        Care EveryWhere ID     This is your  Care EveryWhere ID. This could be used by other organizations to access your [facility] medical records  [id number]        Equal Access to Services     AMERICA MARCH : Dalia Mcintyre, jh arambula, hi valladares, morgan maynard. So [facility] [phone].    ATENCIÓN: Si habla español, tiene a walsh disposición servicios gratuitos de asistencia lingüística. Llame al [phone].    We comply with applicable federal civil rights laws and [place] laws. We do not discriminate on the basis of race, color, national origin, age, disability, sex, sexual orientation, or gender identity.            After Visit Summary       This is your record. Keep this with you and show to your community pharmacist(s) and doctor(s) at your next visit.

## 2018-02-07 LAB — INTERPRETATION ECG - MUSE: NORMAL

## 2018-02-07 NOTE — DISCHARGE INSTRUCTIONS
CHEST PAIN  Based on your visit today, the exact cause of your chest pain is not certain. Your condition does not seem serious and your pain does not appear to be coming from your heart. However, sometimes the signs of a serious problem take more time to appear. Therefore, please watch for the warning signs listed below.  HOME CARE:  1. Rest today and avoid strenuous activity.  2. Take any prescribed medicine as directed.  FOLLOW UP with your doctor in 1-3 days.   GET PROMPT MEDICAL ATTENTION if any of the following occur:    A change in the type of pain: if it feels different, becomes more severe, lasts longer, or begins to spread into your shoulder, arm, neck, jaw or back    Shortness of breath or increased pain with breathing    Cough with blood or dark colored sputum (phlegm)    Weakness, dizziness, or fainting    Fever over 101  F (38.3  C)    Swelling, pain or redness in one leg

## 2018-02-07 NOTE — ED PROVIDER NOTES
Emergency Department Attending Supervision Note  2/6/2018  10:17 PM      I evaluated this patient in conjunction with Ольга Paredes PA-C.      Briefly,  Damaris Sarmiento is a 42 year old female with a history of antiphospholipid syndrome and remote history of subclavian vein thrombosis who presents with chest pain and back pain. 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 She is managed through [facility] specialists at the [facility]. She recently had an evaluation for leg pain and swelling in the end of December and was found to have a negative work up at that time. She has been feeling otherwise fine but tonight around 1900 just before eating dinner she developed a fairly sudden onset of upper chest pain, radiating into the back and her right jaw and neck. The pain was severe enough to cause her to feel some shortness of breath along with nausea and two episodes of vomiting. Given her history, she presents here for evaluation. She reports ongoing chest pain at this time. She has not had anything to eat and denies anything making her chest pain worse or better at this time. She has not had any recent illness, fevers, chills, cough, leg pain or swelling, nausea, or vomiting.     CARDIAC RISK FACTORS:  Sex:    Female  Tobacco:   Never  Hypertension:   No  Hyperlipidemia:  No  Diabetes:   No  Family History:  No    PE/DVT RISK FACTORS:  Sex:    Female  Hormones:   No  Tobacco:   Never  Cancer:   No  Travel:   No  Surgery:   No  Other immobilization: No  Personal history:  Yes  Family history:  Yes    On my exam,   Nursing note and vitals reviewed.  Constitutional:  Oriented to person, place, and time. Cooperative.   HENT:   Nose:    Nose normal.   Mouth/Throat:   Mucous membranes are normal.   Eyes:    Conjunctivae normal and EOM are normal.      Pupils are equal, round, and reactive to light.    Neck:    Trachea normal.   Cardiovascular:  Tachycardic rate, regular rhythm, normal heart sounds and normal pulses. No murmur heard.  Pulmonary/Chest:  Effort normal and breath sounds normal.   Abdominal:   Soft. Normal appearance and bowel sounds are normal.      There is no tenderness.      There is no rebound and no CVA tenderness.   Musculoskeletal:  Extremities atraumatic x 4.   Lymphadenopathy:  No cervical adenopathy.   Neurological:   Alert and oriented to person, place, and time. Normal strength.      No cranial nerve deficit or sensory deficit. GCS eye subscore is 4. GCS verbal subscore is 5. GCS motor subscore is 6.   Skin:    Skin is intact. No rash noted.   Psychiatric:   Anxious.    Results:  Labs Ordered and Resulted from Time of ED Arrival Up to the Time of Departure from the ED   CBC WITH PLATELETS DIFFERENTIAL - Abnormal; Notable for the following:        Result Value    WBC 12.7 (*)     All other components within normal limits   D DIMER QUANTITATIVE   COMPREHENSIVE METABOLIC PANEL   LIPASE   TROPONIN I   PERIPHERAL IV CATHETER        CT Chest Pulmonary Embolism w Contrast   Final Result   IMPRESSION:   1. No evidence of pulmonary embolism. Thoracic aorta is unremarkable.   2. Lungs are clear. No enlarged lymph nodes.      INGA HILL MD          My impression is:  Damaris Sramiento is a 42 year old female who came in with chest pain going into her back. She also vomited a couple of times, which she believes was secondary to the pain. Based on her history, I was concerned that she could have a pulmonary embolism. I also considered the possibility of aortic dissection or esophageal rupture or spasm. Gallbladder disease and pancreatitis would also be on the differential. We proceeded with the above work up here, including blood work, EKG, and CT-scan.  I believe this is unlikely to be cardiac in nature. This certainly could be esophageal in origin. Her CT-scan is unremarkable as is her blood work  other than the slightly elevated white blood cell count. I think she is safe for discharge and outpatient management. She is instructed to follow up with her primary MD and is instructed to return with any worsening symptoms are other concerns.     Diagnosis    ICD-10-CM    1. Acute chest pain R07.9          Ruddy Son MD Lashkowitz, Seth H, MD  02/06/18 2249

## 2018-02-07 NOTE — ED PROVIDER NOTES
History     Chief Complaint:  Chest pain    HPI   Damaris Sarmiento is a 42 year old female who presents with chest pain.  She mentions that when she was eating dinner tonight, she had a sharp onset of chest pain which caused her to then thorough up the food that she is eating.  She said she was eating some beef with rice. She also had a drink of a cold beverage. At that point she mentions that it was hard to breathe. The chest pain did not radiate anywhere. It has decreased since coming to the ED. She says that her pain currently is a 4/5 out of 10. She denies nausea or vomiting.     Cardiac/PE/DVT Risk Factors:  History of hypertension - None.   History of hyperlipidemia - None.   History of diabetes - None.   History of smoking - None.   Personal history of PE/DVT - Yes, personal history of DVT.   Family history of PE/DVT - None.   Family history of heart complications - Yes, paternal uncle.   Recent travel - None.   Recent surgery - None.   Other immobilizations -  None, although sedentary at work.   Cancer - None.     Allergies:  Erythromycin      Medications:    Claritin   Baby aspirin      Past Medical History:    Antiphospholipid antibody syndrome  GERD  PCOS  GERD  Sinusitis  Subclavian vein thrombosis   Thoracic outlet syndrome     Past Surgical History:    Resection first rib  Left foot surgery  Sinus surgery  Orthopedic surgery  Turbinectomy     Family History:    Paget's disease    Social History:  Smoking Status: Never Smoker  Alcohol Use: rarely  Patient presents with .   Marital Status:       Review of Systems   Respiratory: Positive for chest tightness and shortness of breath.    Cardiovascular: Positive for chest pain. Negative for leg swelling.   Gastrointestinal: Positive for vomiting.   All other systems reviewed and are negative.    Physical Exam   First Vitals:  Patient Vitals for the past 24 hrs:   BP Temp Temp src Pulse Heart Rate Resp SpO2 Height Weight   02/06/18 2130 115/71 - -  "- 90 17 96 % - -   02/06/18 2115 114/66 - - - 89 29 100 % - -   02/06/18 2100 125/67 - - - 86 20 100 % - -   02/06/18 2045 129/86 - - - - - - - -   02/06/18 2040 - - - - 84 - 100 % - -   02/06/18 2039 - - - - - - 99 % - -   02/06/18 2038 143/81 - - - - - 94 % - -   02/06/18 2001 138/81 98.7  F (37.1  C) Oral 100 - 18 100 % 1.6 m (5' 3\") 86.1 kg (189 lb 13.1 oz)      Physical Exam  General: Alert and interactive. Sitting comfortably.   Eyes: The pupils are equal and round. EOMs intact. No scleral icterus.   Throat: No signs of erythema or exudate.     Neck: Trachea is in the midline       CV: Regular rate and rhythm. S1 and S2 normal without murmur, click, gallop or rub. No edema of legs. No lower leg tenderness to palpation.   Resp: Breath sounds are clear bilaterally, without rhonchi, wheezes, rales. Non-labored, no retractions or accessory muscle use.     GI: Abdomen is soft without distension. No tenderness to palpation. No peritoneal signs.    MS: Moving all extremities well.   Skin: Warm and dry. No rash or lesions noted.  Neuro:  Alert and oriented x 3. GCS 15.    Psych: Awake. Alert.  Normal affect. Appropriate interactions.  Lymph: No anterior or posterior cervical lymphadenopathy noted.    Emergency Department Course     ECG (20:07:06):  Rate 88 bpm. GA interval 120. QRS duration 80. QT/QTc 350/423. P-R-T axes 60 61 35. Normal sinus rhythm. Normal ECG. Interpreted at 2200 by Ruddy Son MD.     Imaging:  Radiographic findings were communicated with the patient who voiced understanding of the findings.    CT-scan Chest PE protocol w/ contrast:  1. No evidence of pulmonary embolism. Thoracic aorta is unremarkable.  2. Lungs are clear. No enlarged lymph nodes.  As read by Radiology.      Laboratory:  2034 - Troponin: <0.015   CBC: WBC 12.7 (H), o/w WNL (HGB 12.7, )    CMP: WNL (Creatinine 0.66)   D-dimer: 0.3  Lipase: 119    Emergency Department Course:  Past medical records, nursing notes, and " vitals reviewed.  2051: I performed an exam of the patient and obtained history, as documented above.   IV inserted and blood drawn. The patient was placed on continuous cardiac monitoring and pulse oximetry.     The patient was sent for a CT-scan while in the emergency department, findings above.     2211: I rechecked the patient. Findings and plan explained to the Patient. Patient discharged home with instructions regarding supportive care, medications, and reasons to return. The importance of close follow-up was reviewed.      Impression & Plan    Medical Decision Making: Damaris Sarmiento is a 42-year-old with a history of outlet syndrome and a prior DVT who presents with chest pain and shortness of breath.  When she was eating dinner tonight she had sharp onset of chest pain, which caused her to throw up the food that she was eating. She then mentions that it was hard for her to breathe. She does have a history of DVT, and she has some concerns about a pulmonary embolism today. Various lab results were obtained, including CBC, d-dimer, CMP, lipase, and troponin. I also ordered a CT scan due to increased risk for PE because of previous DVT. CT scan came back negative for PE or pnuemonia. No signs of aortic dissection or AAA. EKG also showed NSR without signs of ACS. Troponin was negative.     With a negative workup, this is likely due to an esophageal spasm. I did offer the patient a GI cocktail for potential relief of GERD symptoms that may have caused her chest pain. She denied this, preferring discharge with primary care follow up. I explained to the patient that these pains can sometimes can have an unknown cause. She was encouraged to return to the ED should it happen again, or if the shortness of breath increases, or any other symptoms worsen. At this point, I think she is safe for discharge. She has no further questions nor concerns.     Diagnosis:    ICD-10-CM    1. Acute chest pain R07.9      Disposition:  Discharged to home    Discharge Medications: None.     I, Ольга Paredes PA-C, interviewed the patient, explained the course of action and discussed the patient with Dr. Son, who then evaluated the patient.    Ольга Paredes PA-C  2/6/2018    EMERGENCY DEPARTMENT       Ольга Paredes PA-C  02/06/18 9095

## 2018-09-24 ENCOUNTER — OFFICE VISIT (OUTPATIENT)
Dept: OBGYN | Facility: CLINIC | Age: 43
End: 2018-09-24
Payer: COMMERCIAL

## 2018-09-24 ENCOUNTER — HEALTH MAINTENANCE LETTER (OUTPATIENT)
Age: 43
End: 2018-09-24

## 2018-09-24 VITALS
HEIGHT: 63 IN | HEART RATE: 92 BPM | DIASTOLIC BLOOD PRESSURE: 70 MMHG | WEIGHT: 191.4 LBS | SYSTOLIC BLOOD PRESSURE: 100 MMHG | BODY MASS INDEX: 33.91 KG/M2

## 2018-09-24 DIAGNOSIS — Z13.220 ENCOUNTER FOR LIPID SCREENING FOR CARDIOVASCULAR DISEASE: ICD-10-CM

## 2018-09-24 DIAGNOSIS — Z13.1 SCREENING FOR DIABETES MELLITUS: ICD-10-CM

## 2018-09-24 DIAGNOSIS — Z01.419 ENCOUNTER FOR GYNECOLOGICAL EXAMINATION WITHOUT ABNORMAL FINDING: Primary | ICD-10-CM

## 2018-09-24 DIAGNOSIS — Z13.6 ENCOUNTER FOR LIPID SCREENING FOR CARDIOVASCULAR DISEASE: ICD-10-CM

## 2018-09-24 PROCEDURE — 82947 ASSAY GLUCOSE BLOOD QUANT: CPT | Performed by: OBSTETRICS & GYNECOLOGY

## 2018-09-24 PROCEDURE — 80061 LIPID PANEL: CPT | Performed by: OBSTETRICS & GYNECOLOGY

## 2018-09-24 PROCEDURE — 36415 COLL VENOUS BLD VENIPUNCTURE: CPT | Performed by: OBSTETRICS & GYNECOLOGY

## 2018-09-24 PROCEDURE — 99386 PREV VISIT NEW AGE 40-64: CPT | Performed by: OBSTETRICS & GYNECOLOGY

## 2018-09-24 RX ORDER — AMOXICILLIN 250 MG
1 CAPSULE ORAL
COMMUNITY
Start: 2016-09-20 | End: 2020-08-19

## 2018-09-24 RX ORDER — MUPIROCIN 20 MG/G
OINTMENT TOPICAL
Refills: 0 | COMMUNITY
Start: 2018-06-04

## 2018-09-24 RX ORDER — LIDOCAINE 50 MG/G
1 PATCH TOPICAL
COMMUNITY
Start: 2018-06-04 | End: 2020-07-26

## 2018-09-24 ASSESSMENT — ENCOUNTER SYMPTOMS
NAUSEA: 1
FATIGUE: 1
FEVER: 0
JAUNDICE: 0
BLOATING: 1
HEARTBURN: 1
BREAST MASS: 0
SMELL DISTURBANCE: 0
STIFFNESS: 1
SINUS CONGESTION: 1
WEIGHT LOSS: 1
MUSCLE CRAMPS: 1
JOINT SWELLING: 1
BOWEL INCONTINENCE: 1
BREAST PAIN: 1
EYE REDNESS: 1
ABDOMINAL PAIN: 1
CONSTIPATION: 1
POLYPHAGIA: 0
POLYDIPSIA: 0
BRUISES/BLEEDS EASILY: 0
NECK PAIN: 0
SORE THROAT: 0
HALLUCINATIONS: 0
CHILLS: 1
EYE IRRITATION: 1
BACK PAIN: 1
DECREASED LIBIDO: 0
NAIL CHANGES: 0
HOT FLASHES: 0
NIGHT SWEATS: 1
EYE WATERING: 1
HOARSE VOICE: 0
BLOOD IN STOOL: 0
VOMITING: 0
DOUBLE VISION: 0
TROUBLE SWALLOWING: 1
WEIGHT GAIN: 1
ARTHRALGIAS: 1
MUSCLE WEAKNESS: 1
RECTAL PAIN: 0
INCREASED ENERGY: 1
ALTERED TEMPERATURE REGULATION: 1
EYE PAIN: 1
SINUS PAIN: 1
POOR WOUND HEALING: 0
NECK MASS: 1
SKIN CHANGES: 0
SWOLLEN GLANDS: 0
MYALGIAS: 1
TASTE DISTURBANCE: 0
DIARRHEA: 1

## 2018-09-24 ASSESSMENT — ANXIETY QUESTIONNAIRES
GAD7 TOTAL SCORE: 0
2. NOT BEING ABLE TO STOP OR CONTROL WORRYING: NOT AT ALL
7. FEELING AFRAID AS IF SOMETHING AWFUL MIGHT HAPPEN: NOT AT ALL
6. BECOMING EASILY ANNOYED OR IRRITABLE: NOT AT ALL
3. WORRYING TOO MUCH ABOUT DIFFERENT THINGS: NOT AT ALL
1. FEELING NERVOUS, ANXIOUS, OR ON EDGE: NOT AT ALL
IF YOU CHECKED OFF ANY PROBLEMS ON THIS QUESTIONNAIRE, HOW DIFFICULT HAVE THESE PROBLEMS MADE IT FOR YOU TO DO YOUR WORK, TAKE CARE OF THINGS AT HOME, OR GET ALONG WITH OTHER PEOPLE: NOT DIFFICULT AT ALL
5. BEING SO RESTLESS THAT IT IS HARD TO SIT STILL: NOT AT ALL

## 2018-09-24 ASSESSMENT — PATIENT HEALTH QUESTIONNAIRE - PHQ9: 5. POOR APPETITE OR OVEREATING: NOT AT ALL

## 2018-09-24 NOTE — PROGRESS NOTES
Damaris is a 43 year old  female who presents for annual exam.     Besides routine health maintenance,  she would like to discuss biometric forms.    HPI:  The patient's PCP is none.    Patient had work up at [facility] for some chronic pain issues and had mammogram with 7mm breast mass  Had breast ultrasound and they felt it was benign so they scheduled short term follow up  rec annual 3D mammograms going forward since her report said her tissue is dense  No family history of breast cancer    Nulliparous, periods ok     vasectomy  Periods moderate, a little irregular  Last pap and hpv negative in , plan repeat every 5 yrs      GYNECOLOGIC HISTORY:    Patient's last menstrual period was 2018 (exact date).  Her current contraception method is: none and vasectomy.  She  reports that she has never smoked. She has never used smokeless tobacco.    Patient is sexually active.  STD testing offered?  Declined  Last PHQ-9 score on record =   PHQ-9 SCORE 2018   Total Score 0     Last GAD7 score on record =   KAL-7 SCORE 2018   Total Score 0     Alcohol Score = 1    HEALTH MAINTENANCE:  Cholesterol: 17   Total= 178, Triglycerides=69, HDL=71, LDL=93, FBS=83   Last Mammo: 10/14/16, Result: normal, Next Mammo: Will do at [facility]  Pap: HPV: Negative  Lab Results   Component Value Date    PAP NIL 2015    PAP NIL 2009    PAP NIL 2008     Colonoscopy:  never, Result: not applicable, Next Colonoscopy: age 51.  Dexa:  Never    Health maintenance updated:  yes    HISTORY:  Obstetric History       T0      L0     SAB0   TAB0   Ectopic0   Multiple0   Live Births0           Patient Active Problem List   Diagnosis     CARDIOVASCULAR SCREENING; LDL GOAL LESS THAN 160     GERD (gastroesophageal reflux disease)     Antiphospholipid antibody syndrome (H)     Past Surgical History:   Procedure Laterality Date     C NONSPECIFIC PROCEDURE  2000    resection of first rib     C  NONSPECIFIC PROCEDURE  1992    left foot surgery     ENDOSCOPIC SINUS SURGERY  7/16/2014    Procedure: ENDOSCOPIC SINUS SURGERY;  Surgeon: Suzi Vieyra MD;  Location: [facility]     ORTHOPEDIC SURGERY       TURBINECTOMY  7/16/2014    Procedure: TURBINECTOMY;  Surgeon: Suzi Vieyra MD;  Location: [facility]      Social History   Substance Use Topics     Smoking status: Never Smoker     Smokeless tobacco: Never Used     Alcohol use 0.0 oz/week     0 Standard drinks or equivalent per week      Comment: rare      Problem (# of Occurrences) Relation (Name,Age of Onset)    Allergies (1) Sister    Cancer (1) Maternal Grandmother: lung    Cancer - colorectal (1) Paternal Grandfather    EYE* (1) Maternal Grandfather: glaucoma    Musculoskeletal Disorder (2) Father: Paget's disease , Paternal Uncle: Paget's disease    Other - See Comments (1) Sister: endometriosis    Prostate Cancer (1) Paternal Grandfather            Current Outpatient Prescriptions   Medication Sig     BABY ASPIRIN PO      budesonide (RINOCORT AQUA) 32 MCG/ACT nasal spray Spray 1 spray into both nostrils daily     CLARITIN 10 MG OR TABS 1 TABLET DAILY     ketotifen (ZADITOR/REFRESH ANTI-ITCH) 0.025 % SOLN ophthalmic solution 1 drop     lidocaine (LIDODERM) 5 % Patch Place 1 patch onto the skin     methylcellulose (CITRUCEL) 500 MG TABS tablet Take 100 mg by mouth daily     mupirocin (BACTROBAN) 2 % ointment PIPER A SMALL AMOUNT TO NOSTRILS ONCE OR BID     polyethylene glycol (MIRALAX) powder Take 1 capful by mouth daily     Pseudoephedrine HCl (SUDAFED PO)      senna-docusate (SENOKOT-S;PERICOLACE) 8.6-50 MG per tablet Take 1 tablet by mouth     SODIUM CHLORIDE-SODIUM BICARB NA      No current facility-administered medications for this visit.      Allergies   Allergen Reactions     Cat Hair Extract Hives     Dogs Hives     wheezing     Dust Mites      Other reaction(s): Wheezing  eye mucous     Erythromycin      Gluten Meal      Other reaction(s): GI  "intolerance     Grass Hives     eye mucous     Pollen Extract      Other reaction(s): Other (see comments)  Tree pollen-hives, eye mucous     Ragweeds Hives     eye mucous     Trees      Other reaction(s): Other (see comments)  Sinus issues     Wheat Bran      Other reaction(s): GI intolerance       Past medical, surgical, social and family histories were reviewed and updated in EPIC.    ROS:   12 point review of systems negative other than symptoms noted below.  Constitutional: Fatigue  Gastrointestinal: Abdominal Pain, Bloating, Constipation, Diarrhea and Heartburn  Genitourinary: Irregular Menses and Vaginal Discharge  Musculoskeletal: itching  Endocrine: Cold Intolerance, Excess Hair Growth and Loss of Hair  Blood/Lymph: Nose Bleeds    EXAM:  /70  Pulse 92  Ht 5' 2.75\" (1.594 m)  Wt 191 lb 6.4 oz (86.8 kg)  LMP 09/03/2018 (Exact Date)  Breastfeeding? No  BMI 34.18 kg/m2   BMI: Body mass index is 34.18 kg/(m^2).    PHYSICAL EXAM:  Constitutional:  Appearance: Well nourished, well developed, alert, in no acute distress  Neck:  Lymph Nodes:  No lymphadenopathy present    Thyroid:  Gland size normal, nontender, no nodules or masses present  on palpation  Chest:  Respiratory Effort:  Breathing unlabored  Cardiovascular:    Heart: Auscultation:  Regular rate, normal rhythm, no murmurs present  Breasts: Inspection of Breasts:  No lymphadenopathy present., Palpation of Breasts and Axillae:  No masses present on palpation, no breast tenderness., Axillary Lymph Nodes:  No lymphadenopathy present. and No nodularity, asymmetry or nipple discharge bilaterally.  Gastrointestinal:   Abdominal Examination:  Abdomen nontender to palpation, tone normal without rigidity or guarding, no masses present, umbilicus without lesions   Liver and Spleen:  No hepatomegaly present, liver nontender to palpation    Hernias:  No hernias present  Lymphatic: Lymph Nodes:  No other lymphadenopathy present  Skin:  General Inspection:  " No rashes present, no lesions present, no areas of  discoloration    Genitalia and Groin:  No rashes present, no lesions present, no areas of  discoloration, no masses present  Neurologic/Psychiatric:    Mental Status:  Oriented X3     Pelvic Exam:  External Genitalia:     Normal appearance for age, no discharge present, no tenderness present, no inflammatory lesions present, color normal  Vagina:     Normal vaginal vault without central or paravaginal defects, no discharge present, no inflammatory lesions present, no masses present  Bladder:     Nontender to palpation  Urethra:   Urethral Body:  Urethra palpation normal, urethra structural support normal   Urethral Meatus:  No erythema or lesions present  Cervix:     Appearance healthy, no lesions present, nontender to palpation, no bleeding present  Uterus:     Uterus: firm, normal sized and nontender, midplane in position.   Adnexa:     No adnexal tenderness present, no adnexal masses present  Perineum:     Perineum within normal limits, no evidence of trauma, no rashes or skin lesions present  Anus:     Anus within normal limits, no hemorrhoids present  Inguinal Lymph Nodes:     No lymphadenopathy present  Pubic Hair:     Normal pubic hair distribution for age  Genitalia and Groin:     No rashes present, no lesions present, no areas of discoloration, no masses present      COUNSELING:   Reviewed preventive health counseling, as reflected in patient instructions       Regular exercise       Healthy diet/nutrition    BMI: Body mass index is 34.18 kg/(m^2).  Weight management plan: work on diet and exercise    ASSESSMENT:  43 year old female with satisfactory annual exam.    ICD-10-CM    1. Encounter for gynecological examination without abnormal finding Z01.419    2. Screening for diabetes mellitus Z13.1 Glucose (Fasting)   3. Encounter for lipid screening for cardiovascular disease Z13.220 Lipid panel reflex to direct LDL Fasting    Z13.6        PLAN:  Annual  mammogram  Labs for biometric today, fasting  Declines flu shot  Pap and hpv q 5y, normal 2015      Kathleen Burnham MD

## 2018-09-24 NOTE — MR AVS SNAPSHOT
"              After Visit Summary   9/24/2018    Damaris Sarmiento    MRN: [id number]           Patient Information     Date Of Birth          1975        Visit Information        Provider Department      9/24/2018 3:20 PM Kathleen Burnham MD [facility]        Today's Diagnoses     Encounter for gynecological examination without abnormal finding    -  1    Screening for diabetes mellitus        Encounter for lipid screening for cardiovascular disease           Follow-ups after your visit        Who to contact     If you have questions or need follow up information about today's clinic visit or your schedule please contact [facility] directly at [phone].  Normal or non-critical lab and imaging results will be communicated to you by MyChart, letter or phone within 4 business days after the clinic has received the results. If you do not hear from us within 7 days, please contact the clinic through [employer] or phone. If you have a critical or abnormal lab result, we will notify you by phone as soon as possible.  Submit refill requests through [employer] or call your pharmacy and they will forward the refill request to us. Please allow 3 business days for your refill to be completed.          Additional Information About Your Visit        MyChart Information     [employer] gives you secure access to your electronic health record. If you see a primary care provider, you can also send messages to your care team and make appointments. If you have questions, please call your primary care clinic.  If you do not have a primary care provider, please call [phone] and they will assist you.        Care EveryWhere ID     This is your Care EveryWhere ID. This could be used by other organizations to access your [facility] medical records  [id number]        Your Vitals Were     Pulse Height Last Period Breastfeeding? BMI (Body Mass Index)       92 5' 2.75\" (1.594 m) 09/03/2018 (Exact Date) " No 34.18 kg/m2        Blood Pressure from Last 3 Encounters:   09/24/18 100/70   02/06/18 115/71   09/20/17 100/60    Weight from Last 3 Encounters:   09/24/18 191 lb 6.4 oz (86.8 kg)   02/06/18 189 lb 13.1 oz (86.1 kg)   09/20/17 181 lb 4.8 oz (82.2 kg)              We Performed the Following     Glucose (Fasting)     Lipid panel reflex to direct LDL Fasting        Primary Care Provider Office Phone # Fax #    [facility] [phone]       [address] [facility] [address]        Equal Access to Services     AMERICA MARCH : Hadii calixto Mcintyre, wadaniel arambula, qaronald kaalmada blaine, morgan maynard. So [facility] [phone].    ATENCIÓN: Si habla español, tiene a walsh disposición servicios gratuitos de asistencia lingüística. [facility] [phone].    We comply with applicable federal civil rights laws and [place] laws. We do not discriminate on the basis of race, color, national origin, age, disability, sex, sexual orientation, or gender identity.            Thank you!     Thank you for choosing [facility]  for your care. Our goal is always to provide you with excellent care. Hearing back from our patients is one way we can continue to improve our services. Please take a few minutes to complete the written survey that you may receive in the mail after your visit with us. Thank you!             Your Updated Medication List - Protect others around you: Learn how to safely use, store and throw away your medicines at www.disposemymeds.org.          This list is accurate as of 9/24/18  5:27 PM.  Always use your most recent med list.                   Brand Name Dispense Instructions for use Diagnosis    BABY ASPIRIN PO           budesonide 32 MCG/ACT spray    RINOCORT AQUA     Spray 1 spray into both nostrils daily        CITRUCEL 500 MG Tabs tablet   Generic drug:  methylcellulose      Take 100 mg by mouth daily        CLARITIN 10 MG  tablet   Generic drug:  loratadine      1 TABLET DAILY    Allergic rhinitis, cause unspecified       ketotifen 0.025 % Soln ophthalmic solution    ZADITOR/REFRESH ANTI-ITCH     1 drop        lidocaine 5 % Patch    LIDODERM     Place 1 patch onto the skin        MIRALAX powder   Generic drug:  polyethylene glycol      Take 1 capful by mouth daily        mupirocin 2 % ointment    BACTROBAN     PIPER A SMALL AMOUNT TO NOSTRILS ONCE OR BID        senna-docusate 8.6-50 MG per tablet    SENOKOT-S;PERICOLACE     Take 1 tablet by mouth        SODIUM CHLORIDE-SODIUM BICARB NA           SUDAFED PO

## 2018-09-25 ASSESSMENT — ANXIETY QUESTIONNAIRES: GAD7 TOTAL SCORE: 0

## 2018-09-25 ASSESSMENT — PATIENT HEALTH QUESTIONNAIRE - PHQ9: SUM OF ALL RESPONSES TO PHQ QUESTIONS 1-9: 0

## 2018-09-26 LAB
CHOLEST SERPL-MCNC: 193 MG/DL
GLUCOSE SERPL-MCNC: 80 MG/DL (ref 70–99)
HDLC SERPL-MCNC: 67 MG/DL
LDLC SERPL CALC-MCNC: 109 MG/DL
NONHDLC SERPL-MCNC: 126 MG/DL
TRIGL SERPL-MCNC: 83 MG/DL

## 2018-10-23 ENCOUNTER — HOSPITAL LABORATORY (OUTPATIENT)
Dept: OTHER | Facility: CLINIC | Age: 43
End: 2018-10-23

## 2018-10-28 LAB
BACTERIA SPEC CULT: ABNORMAL
Lab: ABNORMAL
SPECIMEN SOURCE: ABNORMAL

## 2018-12-17 ENCOUNTER — TRANSFERRED RECORDS (OUTPATIENT)
Dept: HEALTH INFORMATION MANAGEMENT | Facility: CLINIC | Age: 43
End: 2018-12-17

## 2018-12-20 ENCOUNTER — HOSPITAL LABORATORY (OUTPATIENT)
Dept: OTHER | Facility: CLINIC | Age: 43
End: 2018-12-20

## 2018-12-22 LAB
BACTERIA SPEC CULT: NORMAL
Lab: NORMAL
SPECIMEN SOURCE: NORMAL

## 2019-02-05 ENCOUNTER — HOSPITAL LABORATORY (OUTPATIENT)
Dept: OTHER | Facility: CLINIC | Age: 44
End: 2019-02-05

## 2019-02-07 LAB
BACTERIA SPEC CULT: ABNORMAL
Lab: ABNORMAL
SPECIMEN SOURCE: ABNORMAL

## 2019-02-11 ENCOUNTER — TRANSFERRED RECORDS (OUTPATIENT)
Dept: HEALTH INFORMATION MANAGEMENT | Facility: CLINIC | Age: 44
End: 2019-02-11

## 2019-04-02 ENCOUNTER — HOSPITAL ENCOUNTER (OUTPATIENT)
Dept: OCCUPATIONAL THERAPY | Facility: CLINIC | Age: 44
Setting detail: THERAPIES SERIES
End: 2019-04-02
Attending: PHYSICAL MEDICINE & REHABILITATION
Payer: COMMERCIAL

## 2019-04-02 PROCEDURE — 97167 OT EVAL HIGH COMPLEX 60 MIN: CPT | Mod: GO | Performed by: OCCUPATIONAL THERAPIST

## 2019-04-02 PROCEDURE — 97535 SELF CARE MNGMENT TRAINING: CPT | Mod: GO | Performed by: OCCUPATIONAL THERAPIST

## 2019-04-04 NOTE — PROGRESS NOTES
"   04/02/19 1618   Rehab Discipline   Discipline OT   Type of Visit   Type of visit Initial Edema Evaluation   General Information   Start of care 04/02/19   Referring physician Elroy Carmona MD   Orders Evaluate and treat as indicated   Order date 12/03/18   Medical diagnosis Lymphedema: RUE   Onset of illness / date of surgery 12/03/18   Edema onset 12/03/18   Edema etiology comments R subclavian DVT 2'99. Pt dx'd thoracic outlet syndrome, non-responsive to PT, had 1st rib removed 2000, \"It stopped the clotting.\"    Pertinent history of current problem (PT: include personal factors and/or comorbidities that impact the POC; OT: include additional occupational profile info) Pt noted immediate onset RUE swelling s/o subclavian DVT & thoracic outlet syndrome, \"It's always an inch bigger all the way around <vs LUE>.\" Had brief consult with lymphedema therapist at [facility]; spouse was instructed in some type of bandaging for at night, and compression sleeve & gauntlet. Pt was instructed to wear compression sleeve & gauntlet prn until recent consult with lymphedema therapist at [facility] who recommended daily wear.  In 2017 pt noted incr discoloration, parasthesias and incr swelling in RUE thus went back to [facility] for f/u.   Surgical / medical history reviewed Yes   Prior level of functional mobility Independent; having some diff d/t L hip torn labrum d/t congenital malformation. 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 Has some diff with stairs d/t L hip issues.   Fall Risk Screen   Fall screen completed by OT   Have you fallen 2 or more times in the past year? No   Have you " "fallen and had an injury in the past year? No   Is patient a fall risk? (Working with PT on mobility)   Fall screen comments Fell 2'2018 at home down 3 steps; sustained massive bruising R buttock   System Outcome Measures   Lymphedema Life Impact Scale (score range 0-72). A higher score indicates greater impairment. 24   Subjective Report   Patient report of symptoms RUE easily fatigued e.g. for driving or washing hair, pain 2/4 on LLIS, RUE feels heavy/skin tight, impaired body image, impaired ADL performance e.g. would like to kayak more or help spouse with household tasks, impaired fit of clothing \"I always have to get a jacket size up and have things tailored.\", impaired sleep   Patient / Family Goals   Patient / family goals statement \"Reduce swelling, self mgt techniques, garment updates, pain mgt.\"   Cognitive Status   Orientation Orientation to person, place and time   Level of consciousness Alert   Follows commands and answers questions 100% of the time   Personal safety and judgement Intact   Memory Intact   Edema Exam / Assessment   Skin condition comments R hand appeared approx symmetrical to L (per pt fingers do sometimes swell royal when wearing compress sleeve/gauntlet at work), very brief/shallow pitting dorsal forearm & over ulna, no pitting PROX to elbow over triceps. Visible/palpable bulge of lymphedema R LAT PEC region extending to R LAT SCAP region.   Girth Measurements   Girth Measurements Refer to separate girth measurement flowsheet   Volume UE   Right UE (mL) 2790ml   Left UE (mL) 2436ml   UE volume comparison RUE volume greater than LUE volume   % difference RUE > LUE = 15% (pt is R hand dominant)   Range of Motion   ROM comments Has RUE ex from PT; R shoulder stretches, nerve flossing, & strengthening with elastic bands & free weights.   Activities of Daily Living   Activities of Daily Living Independent ADLs/IADLs except easily fatigued at which times supportive spouse Bruce assists.   Gait " / Locomotion   Gait / Locomotion Independent all mobility, transfers & ambulation   Sensory   Sensory perception comments Cites intact sensation RUE   Planned Edema Interventions   Planned edema interventions Manual lymph drainage;Gradient compression bandaging;Home management program development   Planned edema intervention comments Recommend intensive Complete Decongestive Therapy (CDT)   Clinical Impression   Criteria for skilled therapeutic intervention met Yes   Therapy diagnosis Chronic RUE/RUQ lymphedema   Influenced by the following impairments / conditions Stage 2  (Stage 2=not reversible with elevation + soft-tissue fibrosis)   Assessment of Occupational Performance 5 or more Performance Deficits   Identified Performance Deficits Pain, impaired mobility, impaired ADL performance, impaired fit of clothing, lacks appropriate home lymphedema mgmt   Clinical Decision Making (Complexity) High complexity   Treatment frequency (5 x wk x 2 wks)   Patient / family and/or staff in agreement with plan of care Yes   Risks and benefits of therapy have been explained Yes   Clinical impression comments Once lymphedema becomes fibrotic (typicaly a rubbery adipose tissue in UEs), UE volume is not typically dramatically reduced, however it is important to reduce risk of progression, clear lymphedema from trunk and determine effective home program.   Goal 1   Goal identifier RUE volume   Goal description For decreased risk infection, skin breakdown/wounds & progressive soft-tissue fibrosis volume will be reduced to no more than 13% > vs volume LUE.   Target date 06/28/19   Goal 2   Goal identifier GCB   Goal description To reduce volume of lymphedema and soft-tissue fibrosis pt will tolerate up to 23hr/day wear gradient compression bandaging (GCB) RUE.   Target date 06/28/19   Goal 3   Goal identifier Home program   Goal description For long-term home mgmt chronic lymphedema pt/caregiver independent in home program sarah  GCB/GCB alternative garment for night wear b. compression garment for day wear c. ex to incr lymph flow/self-MLD.   Target date 06/28/19   Goal 4   Goal identifier Lymphedema precautions   Goal description For decreased risk infection, skin breakdown/wounds, and progressive soft-tissue fibrosis patient will verbalize understanding re lymphedema precautions, and options to treat lymphedema.   Target date 04/01/19   Date met 04/01/19  (Goal met)   Total Evaluation Time   OT Eval, High Complexity Minutes (15178) 30

## 2019-05-31 ENCOUNTER — TRANSFERRED RECORDS (OUTPATIENT)
Dept: HEALTH INFORMATION MANAGEMENT | Facility: CLINIC | Age: 44
End: 2019-05-31

## 2019-06-03 ENCOUNTER — HOSPITAL ENCOUNTER (OUTPATIENT)
Dept: OCCUPATIONAL THERAPY | Facility: CLINIC | Age: 44
Setting detail: THERAPIES SERIES
End: 2019-06-03
Attending: PHYSICAL MEDICINE & REHABILITATION
Payer: COMMERCIAL

## 2019-06-03 PROCEDURE — 97140 MANUAL THERAPY 1/> REGIONS: CPT | Mod: GO | Performed by: OCCUPATIONAL THERAPIST

## 2019-06-04 ENCOUNTER — HOSPITAL ENCOUNTER (OUTPATIENT)
Dept: OCCUPATIONAL THERAPY | Facility: CLINIC | Age: 44
Setting detail: THERAPIES SERIES
End: 2019-06-04
Attending: PHYSICAL MEDICINE & REHABILITATION
Payer: COMMERCIAL

## 2019-06-04 PROCEDURE — 97140 MANUAL THERAPY 1/> REGIONS: CPT | Mod: GO | Performed by: OCCUPATIONAL THERAPIST

## 2019-06-05 ENCOUNTER — HOSPITAL ENCOUNTER (OUTPATIENT)
Dept: OCCUPATIONAL THERAPY | Facility: CLINIC | Age: 44
Setting detail: THERAPIES SERIES
End: 2019-06-05
Attending: PHYSICAL MEDICINE & REHABILITATION
Payer: COMMERCIAL

## 2019-06-05 PROCEDURE — 97140 MANUAL THERAPY 1/> REGIONS: CPT | Mod: GO

## 2019-06-06 ENCOUNTER — HOSPITAL ENCOUNTER (OUTPATIENT)
Dept: OCCUPATIONAL THERAPY | Facility: CLINIC | Age: 44
Setting detail: THERAPIES SERIES
End: 2019-06-06
Attending: PHYSICAL MEDICINE & REHABILITATION
Payer: COMMERCIAL

## 2019-06-06 PROCEDURE — 97140 MANUAL THERAPY 1/> REGIONS: CPT | Mod: GO | Performed by: OCCUPATIONAL THERAPIST

## 2019-06-07 ENCOUNTER — HOSPITAL ENCOUNTER (OUTPATIENT)
Dept: OCCUPATIONAL THERAPY | Facility: CLINIC | Age: 44
Setting detail: THERAPIES SERIES
End: 2019-06-07
Attending: PHYSICAL MEDICINE & REHABILITATION
Payer: COMMERCIAL

## 2019-06-07 PROCEDURE — 97140 MANUAL THERAPY 1/> REGIONS: CPT | Mod: GO

## 2019-06-11 ENCOUNTER — HOSPITAL ENCOUNTER (OUTPATIENT)
Dept: OCCUPATIONAL THERAPY | Facility: CLINIC | Age: 44
Setting detail: THERAPIES SERIES
End: 2019-06-11
Attending: PHYSICAL MEDICINE & REHABILITATION
Payer: COMMERCIAL

## 2019-06-11 PROCEDURE — 97140 MANUAL THERAPY 1/> REGIONS: CPT | Mod: GO

## 2019-06-12 ENCOUNTER — HOSPITAL ENCOUNTER (OUTPATIENT)
Dept: OCCUPATIONAL THERAPY | Facility: CLINIC | Age: 44
Setting detail: THERAPIES SERIES
End: 2019-06-12
Attending: PHYSICAL MEDICINE & REHABILITATION
Payer: COMMERCIAL

## 2019-06-12 PROCEDURE — 97535 SELF CARE MNGMENT TRAINING: CPT | Mod: GO

## 2019-06-12 PROCEDURE — 97140 MANUAL THERAPY 1/> REGIONS: CPT | Mod: GO

## 2019-06-13 ENCOUNTER — HOSPITAL ENCOUNTER (OUTPATIENT)
Dept: OCCUPATIONAL THERAPY | Facility: CLINIC | Age: 44
Setting detail: THERAPIES SERIES
End: 2019-06-13
Attending: PHYSICAL MEDICINE & REHABILITATION
Payer: COMMERCIAL

## 2019-06-13 PROCEDURE — 97140 MANUAL THERAPY 1/> REGIONS: CPT | Mod: GO | Performed by: OCCUPATIONAL THERAPIST

## 2019-06-14 ENCOUNTER — HOSPITAL ENCOUNTER (OUTPATIENT)
Dept: OCCUPATIONAL THERAPY | Facility: CLINIC | Age: 44
Setting detail: THERAPIES SERIES
End: 2019-06-14
Attending: PHYSICAL MEDICINE & REHABILITATION
Payer: COMMERCIAL

## 2019-06-14 PROCEDURE — 97140 MANUAL THERAPY 1/> REGIONS: CPT | Mod: GO

## 2019-07-19 ENCOUNTER — HOSPITAL ENCOUNTER (OUTPATIENT)
Dept: OCCUPATIONAL THERAPY | Facility: CLINIC | Age: 44
Setting detail: THERAPIES SERIES
End: 2019-07-19
Attending: PHYSICAL MEDICINE & REHABILITATION
Payer: COMMERCIAL

## 2019-07-19 PROCEDURE — 97535 SELF CARE MNGMENT TRAINING: CPT | Mod: GO | Performed by: OCCUPATIONAL THERAPIST

## 2019-07-19 PROCEDURE — 97140 MANUAL THERAPY 1/> REGIONS: CPT | Mod: GO | Performed by: OCCUPATIONAL THERAPIST

## 2019-07-19 NOTE — PROGRESS NOTES
"   07/19/19 1234   Signing Clinician's Name / Credentials   Signing clinician's name / credentials Mary Anne Lyle, OTR/L CLT-IGNACIO   Session Number   Session Number 11 - [facility]  (DISCHARGE NOTE)   Goal 1   Goal identifier RUE volume   Goal description For decreased risk infection, skin breakdown/wounds & progressive soft-tissue fibrosis volume will be reduced to no more than 13% > vs volume LUE.   Target date 06/28/19   Date met 06/13/19  (Goal exceeded)   Goal 2   Goal identifier GCB   Goal description To reduce volume of lymphedema and soft-tissue fibrosis pt will tolerate up to 23hr/day wear gradient compression bandaging (GCB) RUE.   Target date 06/28/19   Date met 06/14/19  (GOAL MET)   Goal 3   Goal identifier Home program   Goal description For long-term home mgmt chronic lymphedema pt/caregiver independent in home program a. GCB/GCB alternative garment for night wear b. compression garment for day wear c. ex to incr lymph flow/self-MLD.   Target date 07/19/19   Date met 07/19/19  (Goal met)   Goal 4   Goal identifier Lymphedema precautions   Goal description For decreased risk infection, skin breakdown/wounds, and progressive soft-tissue fibrosis patient will verbalize understanding re lymphedema precautions, and options to treat lymphedema.   Target date 04/01/19   Date met 04/01/19  (Goal met)   Subjective Report   Subjective Report \" A couple times I noticed my L leg swelled up...so I've kind of alternated my wrapping since then b/c it seems like the L side gets overwhelmed...I don't have my garments yet I just got fitted at [employer], so I've been wearing the 20-30's not the 30-40...I gain 3-5 pounds on swollen days.\"   Objective Measure 1   Objective Measure BUE volume   Details RUE > LUE = 4% (see \"Rehab Girth Measurements\" for details)   Self Care/Home Management   Treatment Detail Pt & spouse cite + follow-thru with HP as instructed during intensive CDT with + results. Pt/spouse request " "f/u ED re specifics of MLD e.g. how to position RUE during MLD. ED provided re home MLD to terminus, regional LNs & skin drainage pathways. Created & issued custom diagram ID'ing lymph watersheds & regional LNs.   Self-Care/Home Mgmt/ADL, Compensatory, Meal Prep Minutes (38311) 30 Minutes   Skilled Intervention Pt/spouse ED re HP   Patient Response Pt/spouse attentive to ED provided, asked high level ?'s re MLD & verbalized + understanding re ED provided.   Progress Met goal #3   Manual Therapy   Manual Therapy Minutes (01887) 15   Skilled Intervention Msmts   Patient Response Pt & spouse have done well with following thru home program and have cont'd reduction RUE volume vs LUE.   Treatment Detail BUE msmts (see \"Objective Measure 1\" above for details).   Progress Met goal #3   Plan   Updates to plan of care Discharge Edema treatment to home program. Pt to return with new MD order if exacerbation of sxs or problems with HP; pt verbalized agreement with this POC.   Total Session Time   Timed Code Treatment Minutes 45   Total Treatment Time (sum of timed and untimed services) 45     "

## 2019-07-23 NOTE — PROGRESS NOTES
Outpatient Occupational Therapy Discharge Note     Patient: Damaris Sarmiento  : 1975    DATA: Pt was seen for final session with this writer 19 and discharged from therapy. Patient did not sign consent for services, however verbal consent was given.

## 2019-07-25 NOTE — PROGRESS NOTES
Damaris is a 44 year old  female who presents for annual exam.     Besides routine health maintenance, she would like to discuss acne.    HPI:  The patient's PCP is [facility].    Patient is fasting for labs today. Normal lipids and BS last year. No a1c done previously and patient with history of PCOS    Patient's periods are regular for the most part. One time in winter and one time in spring had a period 2 weeks apart. Other than that are fairly monthly. Always had about a 4 days long period. First day heavy, then 2nd was normal and then 2 days was spotting and done. Now periods are 6-7 days and at least 3 of them are quite heavy. Works from home a couple days a week so can usually manage it but definitely much worse than it was. Not having any vasomotor sx or any perimenopausal issues.    dx'd with PCOS years ago. However d/t thoracic outlet syndrome on both side she did have a subclavian clot on the right. The left side gets compressed to but has been able to be aware of movement to prevent overt compression. Now can't do ocps b/c of increased clotting risk though her clot was anatomic. Has had some vein surgeries that led to lymphedema but doing intensive therapy for that and it's getting much better. However is now also getting horrible acne and wondering if pcos related. Always had acne and trouble with skin but now its huge, deep, painful. Will start with a scaly grey patch, then turn almost purple and then if pops the blemish will leave a huge blood stain on her face with poorly healing skin. Not sure what she can do to make it better. Has a derm appointment  to discuss it.      GYNECOLOGIC HISTORY:    Patient's last menstrual period was 2019 (exact date).  Her current contraception method is: vasectomy.  She  reports that she has never smoked. She has never used smokeless tobacco.    Patient is sexually active.  STD testing offered?  Declined  Last PHQ-9 score on record =    PHQ-9 SCORE 2019   PHQ-9 Total Score 1     Last GAD7 score on record =   KAL-7 SCORE 2019   Total Score 0     Alcohol Score = 1    HEALTH MAINTENANCE:  Cholesterol: 18 Total= 193, Triglycerides=83, HDL=67, CWT=234, FBS=80,     Cholesterol   Date Value Ref Range Status   2018 193 <200 mg/dL Final   2017 178 <200 mg/dL Final      Last Mammo: one year ago, Result: normal, Next Mammo: today   Pap:   Lab Results   Component Value Date    PAP NIL HPV-  2015    PAP NIL 2009    PAP NIL 2008      Colonoscopy:  NEVER, Result: not applicable, Next Colonoscopy: DUE AGE 51 years.  Dexa:  NEVER    Health maintenance updated:  yes    HISTORY:  OB History    Para Term  AB Living   0 0 0 0 0 0   SAB TAB Ectopic Multiple Live Births   0 0 0 0 0       Patient Active Problem List   Diagnosis     CARDIOVASCULAR SCREENING; LDL GOAL LESS THAN 160     GERD (gastroesophageal reflux disease)     Antiphospholipid antibody syndrome (H)     Venous thoracic outlet syndrome of right subclavian vein     History of DVT (deep vein thrombosis)     Cystic acne     Menorrhagia with regular cycle     PCOS (polycystic ovarian syndrome)     Past Surgical History:   Procedure Laterality Date     C NONSPECIFIC PROCEDURE  2000    resection of first rib     C NONSPECIFIC PROCEDURE      left foot surgery     ENDOSCOPIC SINUS SURGERY  2014    Procedure: ENDOSCOPIC SINUS SURGERY;  Surgeon: Suzi Vieyra MD;  Location: [facility]     ORTHOPEDIC SURGERY       TURBINECTOMY  2014    Procedure: TURBINECTOMY;  Surgeon: Suzi Vieyra MD;  Location: [facility]      Social History     Tobacco Use     Smoking status: Never Smoker     Smokeless tobacco: Never Used   Substance Use Topics     Alcohol use: Yes     Alcohol/week: 0.0 oz     Comment: rare      Problem (# of Occurrences) Relation (Name,Age of Onset)    Allergies (1) Sister    Cancer (1) Maternal Grandmother: lung    Cancer -  colorectal (1) Paternal Grandfather    EYE* (1) Maternal Grandfather: glaucoma    Hyperlipidemia (1) Father    Musculoskeletal Disorder (2) Father: Paget's disease , Paternal Uncle: Paget's disease    Osteoporosis (1) Mother    Other - See Comments (1) Sister: endometriosis    Prostate Cancer (1) Paternal Grandfather            Current Outpatient Medications   Medication Sig     acetaminophen (TYLENOL 8 HOUR ARTHRITIS PAIN) 650 MG CR tablet      BABY ASPIRIN PO      budesonide (RINOCORT AQUA) 32 MCG/ACT nasal spray Spray 1 spray into both nostrils daily     CLARITIN 10 MG OR TABS 1 TABLET DAILY     ketotifen (ZADITOR/REFRESH ANTI-ITCH) 0.025 % SOLN ophthalmic solution 1 drop     lidocaine (LIDODERM) 5 % Patch Place 1 patch onto the skin     methylcellulose (CITRUCEL) 500 MG TABS tablet Take 100 mg by mouth daily     mupirocin (BACTROBAN) 2 % ointment PIPER A SMALL AMOUNT TO NOSTRILS ONCE OR BID     polyethylene glycol (MIRALAX) powder Take 1 capful by mouth daily     Pseudoephedrine HCl (SUDAFED PO)      senna-docusate (SENOKOT-S;PERICOLACE) 8.6-50 MG per tablet Take 1 tablet by mouth     SODIUM CHLORIDE-SODIUM BICARB NA      spironolactone (ALDACTONE) 50 MG tablet Take 1 tablet (50 mg) by mouth 2 times daily     vitamin B-12 (CYANOCOBALAMIN) 1000 MCG tablet      vitamin D3 (CHOLECALCIFEROL) 2000 units (50 mcg) tablet Take 1 tablet by mouth     No current facility-administered medications for this visit.      Allergies   Allergen Reactions     Cat Hair Extract Hives     Dogs Hives     wheezing     Dust Mites      Other reaction(s): Wheezing  eye mucous     Erythromycin      Gluten Meal      Other reaction(s): GI intolerance     Grass Hives     eye mucous     Pollen Extract      Other reaction(s): Other (see comments)  Tree pollen-hives, eye mucous     Ragweeds Hives     eye mucous     Trees      Other reaction(s): Other (see comments)  Sinus issues     Wheat Bran Hives     Other reaction(s): GI intolerance       Past  "medical, surgical, social and family histories were reviewed and updated in EPIC.    ROS:   12 point review of systems negative other than symptoms noted below.  Constitutional: Fatigue  Cardiovascular: Lower Extremity Swelling  Gastrointestinal: Abdominal Pain, Constipation and Diarrhea  Genitourinary: Frequency, Irregular Menses and Pelvic Pain  Skin: Acne and New Skin Lesions    EXAM:  /72   Pulse 84   Ht 1.6 m (5' 3\")   Wt 88.6 kg (195 lb 6.4 oz)   LMP 07/11/2019 (Exact Date)   BMI 34.61 kg/m     BMI: Body mass index is 34.61 kg/m .    PHYSICAL EXAM:  Constitutional:  Appearance: Well nourished, well developed, alert, in no acute distress  Neck:  Lymph Nodes:  No lymphadenopathy present    Thyroid:  Gland size normal, nontender, no nodules or masses present  on palpation  Chest:  Respiratory Effort:  Breathing unlabored  Cardiovascular:    Heart: Auscultation:  Regular rate, normal rhythm, no murmurs present  Breasts: Palpation of Breasts and Axillae:  No masses present on palpation, no breast tenderness., No nodularity, asymmetry or nipple discharge bilaterally. and DENSE BILATERALLY  Gastrointestinal:   Abdominal Examination:  Abdomen nontender to palpation, tone normal without rigidity or guarding, no masses present, umbilicus without lesions   Liver and Spleen:  No hepatomegaly present, liver nontender to palpation    Hernias:  No hernias present  Lymphatic: Lymph Nodes:  No other lymphadenopathy present  Skin:  General Inspection:  No rashes present, no lesions present, no areas of  discoloration    Genitalia and Groin:  No rashes present, no lesions present, no areas of  discoloration, no masses present  Neurologic/Psychiatric:    Mental Status:  Oriented X3     Pelvic Exam:  External Genitalia:     Normal appearance for age, no discharge present, no tenderness present, no inflammatory lesions present, color normal  Vagina:     Normal vaginal vault without central or paravaginal defects, no " discharge present, no inflammatory lesions present, no masses present  Bladder:     Nontender to palpation  Urethra:   Urethral Body:  Urethra palpation normal, urethra structural support normal   Urethral Meatus:  No erythema or lesions present  Cervix:     Appearance healthy, no lesions present, nontender to palpation, no bleeding present  Uterus:     Uterus: firm, normal sized and nontender, anteverted in position.   Adnexa:     No adnexal tenderness present, no adnexal masses present  Perineum:     Perineum within normal limits, no evidence of trauma, no rashes or skin lesions present  Anus:     Anus within normal limits, no hemorrhoids present  Inguinal Lymph Nodes:     No lymphadenopathy present  Pubic Hair:     Normal pubic hair distribution for age  Genitalia and Groin:     No rashes present, no lesions present, no areas of discoloration, no masses present      COUNSELING:   Reviewed preventive health counseling, as reflected in patient instructions    BMI: Body mass index is 34.61 kg/m .  Weight management plan: Discussed healthy diet and exercise guidelines    ASSESSMENT:  44 year old female with satisfactory annual exam.    ICD-10-CM    1. Encounter for gynecological examination without abnormal finding Z01.419    2. PCOS (polycystic ovarian syndrome) E28.2 Hemoglobin A1c   3. Cystic acne L70.0 spironolactone (ALDACTONE) 50 MG tablet   4. Menorrhagia with regular cycle N92.0    5. Screening for cardiovascular condition Z13.6 Lipid panel reflex to direct LDL Fasting   6. Screening for metabolic disorder Z13.228 Comprehensive metabolic panel   7. History of DVT (deep vein thrombosis) Z86.718    8. Venous thoracic outlet syndrome of right subclavian vein I87.1        PLAN:  Pap is UTD for one more year  mammo today and encouraged to do 3D given dense breasts and h.o call back and bx on the left side with marker still in place  Fasting lipids, cmp and a1c today    Discussed her menorrhagia, acne and PCOS.  Since her periods are mostly regular and with her h.o DVT she can't do ocps or lysteda we did discuss mirena or kyleena. Though she's a nullip her cervix is actually quite soft and external os is nice and open. Do think mirena would give her long term better menorrhagia control with just slightly more discomfort during insertion. No increased clot risk with the mirena or kyleena especially since her dvt was anatomic and not hypercoagulability. Given info to read and will consider it.    Patient does have quite a bit of cystic acne and variable degrees of acne scaring. Has a couple superficial almost abrasion areas on her right cheek and right eyebrow from recently popped acne. Will defer to derm for management if they have a different opinion but could certainly start on spironolactone to help with that. Would start 50mg daily and slowly increase to BID. Could even do 100mg daily but will have derm determine if better regimen needs to be done or if needs abx or topical. Will start her on meds and then keep her derm appointment in 3 weeks    Spent an additional 15 min on top of her annual addressing the menorrhagia and acne    Abby Mendoza MD

## 2019-07-26 ENCOUNTER — OFFICE VISIT (OUTPATIENT)
Dept: OBGYN | Facility: CLINIC | Age: 44
End: 2019-07-26
Payer: COMMERCIAL

## 2019-07-26 ENCOUNTER — ANCILLARY PROCEDURE (OUTPATIENT)
Dept: MAMMOGRAPHY | Facility: CLINIC | Age: 44
End: 2019-07-26
Payer: COMMERCIAL

## 2019-07-26 VITALS
HEIGHT: 63 IN | DIASTOLIC BLOOD PRESSURE: 72 MMHG | WEIGHT: 195.4 LBS | BODY MASS INDEX: 34.62 KG/M2 | HEART RATE: 84 BPM | SYSTOLIC BLOOD PRESSURE: 130 MMHG

## 2019-07-26 DIAGNOSIS — Z13.228 SCREENING FOR METABOLIC DISORDER: ICD-10-CM

## 2019-07-26 DIAGNOSIS — Z13.6 SCREENING FOR CARDIOVASCULAR CONDITION: ICD-10-CM

## 2019-07-26 DIAGNOSIS — I87.1 VENOUS THORACIC OUTLET SYNDROME OF RIGHT SUBCLAVIAN VEIN: ICD-10-CM

## 2019-07-26 DIAGNOSIS — Z86.718 HISTORY OF DVT (DEEP VEIN THROMBOSIS): ICD-10-CM

## 2019-07-26 DIAGNOSIS — E28.2 PCOS (POLYCYSTIC OVARIAN SYNDROME): ICD-10-CM

## 2019-07-26 DIAGNOSIS — Z01.419 ENCOUNTER FOR GYNECOLOGICAL EXAMINATION WITHOUT ABNORMAL FINDING: Primary | ICD-10-CM

## 2019-07-26 DIAGNOSIS — N92.0 MENORRHAGIA WITH REGULAR CYCLE: ICD-10-CM

## 2019-07-26 DIAGNOSIS — L70.0 CYSTIC ACNE: ICD-10-CM

## 2019-07-26 DIAGNOSIS — Z12.31 VISIT FOR SCREENING MAMMOGRAM: ICD-10-CM

## 2019-07-26 LAB
ALBUMIN SERPL-MCNC: 3.6 G/DL (ref 3.4–5)
ALP SERPL-CCNC: 50 U/L (ref 40–150)
ALT SERPL W P-5'-P-CCNC: 31 U/L (ref 0–50)
ANION GAP SERPL CALCULATED.3IONS-SCNC: 9 MMOL/L (ref 3–14)
AST SERPL W P-5'-P-CCNC: 17 U/L (ref 0–45)
BILIRUB SERPL-MCNC: 0.4 MG/DL (ref 0.2–1.3)
BUN SERPL-MCNC: 6 MG/DL (ref 7–30)
CALCIUM SERPL-MCNC: 8.9 MG/DL (ref 8.5–10.1)
CHLORIDE SERPL-SCNC: 105 MMOL/L (ref 94–109)
CHOLEST SERPL-MCNC: 245 MG/DL
CO2 SERPL-SCNC: 24 MMOL/L (ref 20–32)
CREAT SERPL-MCNC: 0.54 MG/DL (ref 0.52–1.04)
GFR SERPL CREATININE-BSD FRML MDRD: >90 ML/MIN/{1.73_M2}
GLUCOSE SERPL-MCNC: 83 MG/DL (ref 70–99)
HBA1C MFR BLD: 5.1 % (ref 0–5.6)
HDLC SERPL-MCNC: 71 MG/DL
LDLC SERPL CALC-MCNC: 151 MG/DL
NONHDLC SERPL-MCNC: 174 MG/DL
POTASSIUM SERPL-SCNC: 3.6 MMOL/L (ref 3.4–5.3)
PROT SERPL-MCNC: 7.5 G/DL (ref 6.8–8.8)
SODIUM SERPL-SCNC: 138 MMOL/L (ref 133–144)
TRIGL SERPL-MCNC: 130 MG/DL

## 2019-07-26 PROCEDURE — 36415 COLL VENOUS BLD VENIPUNCTURE: CPT | Performed by: OBSTETRICS & GYNECOLOGY

## 2019-07-26 PROCEDURE — 80053 COMPREHEN METABOLIC PANEL: CPT | Performed by: OBSTETRICS & GYNECOLOGY

## 2019-07-26 PROCEDURE — 77063 BREAST TOMOSYNTHESIS BI: CPT | Mod: TC

## 2019-07-26 PROCEDURE — 99396 PREV VISIT EST AGE 40-64: CPT | Performed by: OBSTETRICS & GYNECOLOGY

## 2019-07-26 PROCEDURE — 99213 OFFICE O/P EST LOW 20 MIN: CPT | Mod: 25 | Performed by: OBSTETRICS & GYNECOLOGY

## 2019-07-26 PROCEDURE — 77067 SCR MAMMO BI INCL CAD: CPT | Mod: TC

## 2019-07-26 PROCEDURE — 80061 LIPID PANEL: CPT | Performed by: OBSTETRICS & GYNECOLOGY

## 2019-07-26 PROCEDURE — 83036 HEMOGLOBIN GLYCOSYLATED A1C: CPT | Performed by: OBSTETRICS & GYNECOLOGY

## 2019-07-26 RX ORDER — SPIRONOLACTONE 50 MG/1
50 TABLET, FILM COATED ORAL 2 TIMES DAILY
Qty: 180 TABLET | Refills: 3 | Status: SHIPPED | OUTPATIENT
Start: 2019-07-26 | End: 2019-08-31

## 2019-07-26 RX ORDER — SENNOSIDES 8.6 MG
CAPSULE ORAL
COMMUNITY
Start: 2018-12-13

## 2019-07-26 RX ORDER — CHOLECALCIFEROL (VITAMIN D3) 50 MCG
1 TABLET ORAL
COMMUNITY
Start: 2017-07-28

## 2019-07-26 ASSESSMENT — ANXIETY QUESTIONNAIRES
3. WORRYING TOO MUCH ABOUT DIFFERENT THINGS: NOT AT ALL
7. FEELING AFRAID AS IF SOMETHING AWFUL MIGHT HAPPEN: NOT AT ALL
5. BEING SO RESTLESS THAT IT IS HARD TO SIT STILL: NOT AT ALL
2. NOT BEING ABLE TO STOP OR CONTROL WORRYING: NOT AT ALL
1. FEELING NERVOUS, ANXIOUS, OR ON EDGE: NOT AT ALL
6. BECOMING EASILY ANNOYED OR IRRITABLE: NOT AT ALL
GAD7 TOTAL SCORE: 0

## 2019-07-26 ASSESSMENT — PATIENT HEALTH QUESTIONNAIRE - PHQ9
SUM OF ALL RESPONSES TO PHQ QUESTIONS 1-9: 1
5. POOR APPETITE OR OVEREATING: NOT AT ALL

## 2019-07-26 ASSESSMENT — MIFFLIN-ST. JEOR: SCORE: 1510.46

## 2019-07-27 ASSESSMENT — ANXIETY QUESTIONNAIRES: GAD7 TOTAL SCORE: 0

## 2019-07-29 ENCOUNTER — MYC MEDICAL ADVICE (OUTPATIENT)
Dept: OBGYN | Facility: CLINIC | Age: 44
End: 2019-07-29

## 2019-07-29 DIAGNOSIS — Z13.6 CARDIOVASCULAR SCREENING; LDL GOAL LESS THAN 160: Primary | ICD-10-CM

## 2019-07-29 DIAGNOSIS — Z13.6 ENCOUNTER FOR LIPID SCREENING FOR CARDIOVASCULAR DISEASE: ICD-10-CM

## 2019-07-29 DIAGNOSIS — Z13.220 ENCOUNTER FOR LIPID SCREENING FOR CARDIOVASCULAR DISEASE: ICD-10-CM

## 2019-07-29 NOTE — TELEPHONE ENCOUNTER
Please see my chart message. Routing to Dr. Mendoza      7/26/19 Damaris,   Your metabolic panel looks good. The BUN is a little low but this is fine, we only worry if it's high as it's a function of kidneys and hydration. Your hemoglobin A1C is normal as well. This is a test of how your blood sugars have been running over the prior 3 months. I did this b/c of the PCOS and higher risk for diabetes with that, but your's is great.   Your cholesterol is quite a bit higher today than it was a year ago. Your HDL or good cholesterol is really good so that helps offset the LDL which is now 151. Normal is under 130. We usually don't use medication unless you have other risk factors like high blood pressure or diabetes, until the LDL is >180 or so.   It's important to really focus on low carb, low carb diet, exercise and weight loss. Sometimes even 10 pounds can make a big different in the cholesterol. We should repeat it again in 6-12 months.

## 2019-07-29 NOTE — TELEPHONE ENCOUNTER
I think the medication will be just fine. We should check another potassium/creatinine level in 4 weeks if you stay on just 50mg daily. If derm has her increase to 100mg daily then maybe we check labs at 2 and 4 weeks both but long term that shouldn't be a huge issue. Its a med used for HTN in the past so it's fine for kidneys in most people    Didn't realize she already had such a low call restricted diet. I agree then that a lot more food changes aren't likely to help much. Definitely if she was able to exercise more that would help with general weight loss but if she can't then obviously she can only do so much.    Given that there's not a lot of lifestyle factors she can cahnge at this point we may not be able to do much but watch her cholesterol on a 6-12 month basis and if gets to an LDL >170s then probably start meds at that point.  If she had DM or HTN then we'd have to start meds sooner. Since she doesn't they usually don't start meds until every 180-190 and have people work on lifestyle changes. Since the latter isn't our issue I'd have her start meds at a bit of a lower number, knowing that other than genetics that there's nothing she's doing to contribute to this.    Also the notes on garlique and omega 3 that lucille called her about could help

## 2019-08-16 ENCOUNTER — TRANSFERRED RECORDS (OUTPATIENT)
Dept: HEALTH INFORMATION MANAGEMENT | Facility: CLINIC | Age: 44
End: 2019-08-16

## 2019-11-06 ENCOUNTER — HEALTH MAINTENANCE LETTER (OUTPATIENT)
Age: 44
End: 2019-11-06

## 2019-11-11 ENCOUNTER — HOSPITAL LABORATORY (OUTPATIENT)
Dept: OTHER | Facility: CLINIC | Age: 44
End: 2019-11-11

## 2019-11-13 LAB
BACTERIA SPEC CULT: NO GROWTH
Lab: NORMAL
SPECIMEN SOURCE: NORMAL

## 2019-11-22 ENCOUNTER — TRANSFERRED RECORDS (OUTPATIENT)
Dept: HEALTH INFORMATION MANAGEMENT | Facility: CLINIC | Age: 44
End: 2019-11-22

## 2020-01-07 ENCOUNTER — HOSPITAL LABORATORY (OUTPATIENT)
Dept: OTHER | Facility: CLINIC | Age: 45
End: 2020-01-07

## 2020-01-10 LAB
BACTERIA SPEC CULT: ABNORMAL
Lab: ABNORMAL
SPECIMEN SOURCE: ABNORMAL

## 2020-02-12 ENCOUNTER — TRANSFERRED RECORDS (OUTPATIENT)
Dept: HEALTH INFORMATION MANAGEMENT | Facility: CLINIC | Age: 45
End: 2020-02-12

## 2020-02-28 ENCOUNTER — TRANSFERRED RECORDS (OUTPATIENT)
Dept: HEALTH INFORMATION MANAGEMENT | Facility: CLINIC | Age: 45
End: 2020-02-28

## 2020-05-21 ENCOUNTER — TRANSFERRED RECORDS (OUTPATIENT)
Dept: HEALTH INFORMATION MANAGEMENT | Facility: CLINIC | Age: 45
End: 2020-05-21

## 2020-08-18 NOTE — PROGRESS NOTES
Damaris is a 45 year old  female who presents for annual exam.     Besides routine health maintenance,  she would like to discuss continuous fatigue.    HPI:  The patient's PCP is [facility].    Patient is still seeing PCP through [facility]. When here last was having regular but heavy periods. Lasted 5 days with 2 of them really heavy and one moderately heavy. Has had PCOS most of her life and never had regular periods until the last few years. In this last year her periods ate regular and not nearly as heavy as last year. Has maybe one heavier day but totally manageable.    Patient's sister had a stroke this year. Has a factor eight that is just slightly higher than normal and damaris does too. No other hypercoag in either of their work ups. Was just put on low dose ocps for perimenopausal reasons before this happened so may have been the cause.    Patient continues to have chronic and constant fatigue. Has tried to eat healthier and exercise some. Has lost 15# or so since last year. Not totally intentional. Has had IBS-D for years. Since childhood really. Had a really bad 2 months with it so lots of diarrhea led to initial weight loss but now has maintained it. Did a low inflammation diet and cut out a lot of different foods one by one to see what would help and nothing really changed. Had a flex sig in  and all normal but hasn't had a full colonoscopy    Also has the skin lesions. Have been bx'd and show hyperinflammation but no specific dx like lupus. Has had esr and CRP that is minimal elevated for years but again, nothing specific ever found other than general inflammation likely going on      GYNECOLOGIC HISTORY:    Patient's last menstrual period was 2020.    Regular menses? yes  Menses every 27-29 days.  Length of menses: 5 days    Her current contraception method is: vasectomy.  She  reports that she has never smoked. She has never used smokeless tobacco.    Patient is sexually  active.  STD testing offered?  Declined  Last PHQ-9 score on record =   PHQ-9 SCORE 2020   PHQ-9 Total Score 2     Last GAD7 score on record =   KAL-7 SCORE 2020   Total Score 0     Alcohol Score = 1    HEALTH MAINTENANCE:  Cholesterol:   Recent Labs   Lab Test 19  1540 18  1539  12/10/14  1033 13  0949   CHOL 245* 193   < > 197 178   HDL 71 67   < > 62 51   * 109*   < > 122 114   TRIG 130 83   < > 63 64   CHOLHDLRATIO  --   --   --  3.2 3.5   GLUCOSE 83         Last Mammo: 19, Result: benign, Next Mammo: Today   Pap: hpv -   Lab Results   Component Value Date    PAP NIL 2015    PAP NIL 2009    PAP NIL 2008      Colonoscopy:  NA, Next Colonoscopy: AGE 46  Dexa:  NA    Health maintenance updated:  No, Due for pap smear    HISTORY:  OB History    Para Term  AB Living   0 0 0 0 0 0   SAB TAB Ectopic Multiple Live Births   0 0 0 0 0       Patient Active Problem List   Diagnosis     CARDIOVASCULAR SCREENING; LDL GOAL LESS THAN 160     GERD (gastroesophageal reflux disease)     Antiphospholipid antibody syndrome (H)     Venous thoracic outlet syndrome of right subclavian vein     History of DVT (deep vein thrombosis)     Cystic acne     Menorrhagia with regular cycle     PCOS (polycystic ovarian syndrome)     Past Surgical History:   Procedure Laterality Date     C NONSPECIFIC PROCEDURE  2000    resection of first rib     C NONSPECIFIC PROCEDURE      left foot surgery     ENDOSCOPIC SINUS SURGERY  2014    Procedure: ENDOSCOPIC SINUS SURGERY;  Surgeon: Suzi Vieyra MD;  Location: [facility]     ORTHOPEDIC SURGERY       TURBINECTOMY  2014    Procedure: TURBINECTOMY;  Surgeon: Suzi Vieyra MD;  Location: [facility]      Social History     Tobacco Use     Smoking status: Never Smoker     Smokeless tobacco: Never Used   Substance Use Topics     Alcohol use: Yes     Alcohol/week: 0.0 standard drinks     Comment: rare      Problem (# of  Occurrences) Relation (Name,Age of Onset)    Allergies (1) Sister    Cancer (1) Maternal Grandmother: lung    Cancer - colorectal (1) Paternal Grandfather    Cerebrovascular Disease (1) Sister (47): stroke after recent ocp start for perimenopausal sx    EYE* (1) Maternal Grandfather: glaucoma    Hyperlipidemia (1) Father    Musculoskeletal Disorder (2) Father: Paget's disease , Paternal Uncle: Paget's disease    Osteoporosis (1) Mother    Other - See Comments (1) Sister: endometriosis    Prostate Cancer (1) Paternal Grandfather            Current Outpatient Medications   Medication Sig     acetaminophen (TYLENOL 8 HOUR ARTHRITIS PAIN) 650 MG CR tablet      BABY ASPIRIN PO      budesonide (RINOCORT AQUA) 32 MCG/ACT nasal spray Spray 1 spray into both nostrils daily     CLARITIN 10 MG OR TABS 1 TABLET DAILY     ketotifen (ZADITOR/REFRESH ANTI-ITCH) 0.025 % SOLN ophthalmic solution 1 drop     methylcellulose (CITRUCEL) 500 MG TABS tablet Take 100 mg by mouth daily     mupirocin (BACTROBAN) 2 % ointment PIPER A SMALL AMOUNT TO NOSTRILS ONCE OR BID     polyethylene glycol (MIRALAX) powder Take 1 capful by mouth daily     Pseudoephedrine HCl (SUDAFED PO)      SODIUM CHLORIDE-SODIUM BICARB NA      UNABLE TO FIND MEDICATION NAME: Salt Stick Vitassium     vitamin B-12 (CYANOCOBALAMIN) 1000 MCG tablet      vitamin D3 (CHOLECALCIFEROL) 2000 units (50 mcg) tablet Take 1 tablet by mouth     No current facility-administered medications for this visit.      Allergies   Allergen Reactions     Cat Hair Extract Hives     Dogs Hives     wheezing     Dust Mites      Other reaction(s): Wheezing  eye mucous     Erythromycin      Gluten Meal      Other reaction(s): GI intolerance     Grass Hives     eye mucous     Pollen Extract      Other reaction(s): Other (see comments)  Tree pollen-hives, eye mucous     Ragweeds Hives     eye mucous     Trees      Other reaction(s): Other (see comments)  Sinus issues     Wheat Bran Hives     Other  "reaction(s): GI intolerance       Past medical, surgical, social and family histories were reviewed and updated in EPIC.    ROS:   12 point review of systems negative other than symptoms noted below or in the HPI.  Constitutional: Fatigue  No urinary frequency or dysuria, bladder or kidney problems, Normal menstrual cycles    EXAM:  /70 (BP Location: Left arm, Patient Position: Sitting, Cuff Size: Adult Regular)   Pulse 84   Ht 1.594 m (5' 2.75\")   Wt 81.6 kg (180 lb)   LMP 08/02/2020   BMI 32.14 kg/m     BMI: Body mass index is 32.14 kg/m .    PHYSICAL EXAM:  Constitutional:   Appearance: Well nourished, well developed, alert, in no acute distress  Neck:  Lymph Nodes:  No lymphadenopathy present    Thyroid:  Gland size normal, nontender, no nodules or masses present  on palpation  Chest:  Respiratory Effort:  Breathing unlabored  Cardiovascular:    Heart: Auscultation:  Regular rate, normal rhythm, no murmurs present  Breasts: Palpation of Breasts and Axillae:  No masses present on palpation, no breast tenderness., Axillary Lymph Nodes:  No lymphadenopathy present. and No nodularity, asymmetry or nipple discharge bilaterally.  Gastrointestinal:   Abdominal Examination:  Abdomen nontender to palpation, tone normal without rigidity or guarding, no masses present, umbilicus without lesions   Liver and Spleen:  No hepatomegaly present, liver nontender to palpation    Hernias:  No hernias present  Lymphatic: Lymph Nodes:  No other lymphadenopathy present  Skin:  General Inspection:  No rashes present, no lesions present, no areas of  discoloration  Neurologic:    Mental Status:  Oriented X3.  Normal strength and tone, sensory exam                grossly normal, mentation intact and speech normal.    Psychiatric:   Mentation appears normal and affect normal/bright.         Pelvic Exam:  External Genitalia:     Normal appearance for age, no discharge present, no tenderness present, no inflammatory lesions " present, color normal  Vagina:     Normal vaginal vault without central or paravaginal defects, no discharge present, no inflammatory lesions present, no masses present  Bladder:     Nontender to palpation  Urethra:   Urethral Body:  Urethra palpation normal, urethra structural support normal   Urethral Meatus:  No erythema or lesions present  Cervix:     Appearance healthy, no lesions present, nontender to palpation, no bleeding present  Uterus:     Uterus: firm, normal sized and nontender, anteverted in position.   Adnexa:     No adnexal tenderness present, no adnexal masses present  Perineum:     Perineum within normal limits, no evidence of trauma, no rashes or skin lesions present  Anus:     Anus within normal limits, no hemorrhoids present  Inguinal Lymph Nodes:     No lymphadenopathy present  Pubic Hair:     Normal pubic hair distribution for age  Genitalia and Groin:     No rashes present, no lesions present, no areas of discoloration, no masses present      COUNSELING:   Reviewed preventive health counseling, as reflected in patient instructions  Special attention given to:        fatigue/GI status       Regular exercise       Healthy diet/nutrition    BMI: Body mass index is 32.14 kg/m .  Weight management plan: Discussed healthy diet and exercise guidelines    ASSESSMENT:  45 year old female with satisfactory annual exam.    ICD-10-CM    1. Encounter for gynecological examination without abnormal finding  Z01.419 Pap imaged thin layer screen with HPV - recommended age 30 - 65 years (select HPV order below)     HPV High Risk Types DNA Cervical   2. Chronic fatigue, unspecified  R53.82 Ferritin     Vitamin B12     ESR: Erythrocyte sedimentation rate     CRP inflammation   3. Irritable bowel syndrome with diarrhea  K58.0 Ferritin     Vitamin B12     GASTROENTEROLOGY ADULT REF CONSULT ONLY   4. Menorrhagia with regular cycle  N92.0 CBC with platelets     Iron and iron binding capacity     Vitamin D Deficiency      Ferritin   5. Encounter for lipid screening for cardiovascular disease  Z13.220 Lipid panel reflex to direct LDL Fasting    Z13.6    6. Screening for metabolic disorder  Z13.228 Comprehensive metabolic panel   7. Screening for thyroid disorder  Z13.29 TSH with free T4 reflex   8. Elevated sed rate  R70.0 GASTROENTEROLOGY ADULT REF CONSULT ONLY   9. Elevated C-reactive protein (CRP)  R79.82 GASTROENTEROLOGY ADULT REF CONSULT ONLY       PLAN:  Pap and cotesting done today  mammo today  Will do fasting labs today along with b12, D, thyroid, iron and repeat sed rate and crp.   Based on several markers of inflammation and her skin lesions and chronic diarrhea she very well have an autoimmune colitis/lymphocytic colitis, etc  Recommend full colonoscoyp and a referral was sent in  Continue to try to avoid gluten and dairy but will await GI referral  If iron deficient from anemia would consider options that are not hormonal given she's already had a subclavian clot and her sister with stroke at age 47  Discussed menopausal sx in future and HRT vs nonhormonal options    Spent an additional 15 min, in addition to her annual, 100% of which was in face to face counseling time, addressing the fatigue, diarrhea, etc.   Reviewed patient's several page logging of her sx and timing. No pattern or cyclic nature found      Abby Mendoza MD

## 2020-08-19 ENCOUNTER — ANCILLARY PROCEDURE (OUTPATIENT)
Dept: MAMMOGRAPHY | Facility: CLINIC | Age: 45
End: 2020-08-19
Payer: COMMERCIAL

## 2020-08-19 ENCOUNTER — OFFICE VISIT (OUTPATIENT)
Dept: OBGYN | Facility: CLINIC | Age: 45
End: 2020-08-19
Payer: COMMERCIAL

## 2020-08-19 VITALS
SYSTOLIC BLOOD PRESSURE: 100 MMHG | HEART RATE: 84 BPM | BODY MASS INDEX: 31.89 KG/M2 | HEIGHT: 63 IN | DIASTOLIC BLOOD PRESSURE: 70 MMHG | WEIGHT: 180 LBS

## 2020-08-19 DIAGNOSIS — Z13.29 SCREENING FOR THYROID DISORDER: ICD-10-CM

## 2020-08-19 DIAGNOSIS — Z13.220 ENCOUNTER FOR LIPID SCREENING FOR CARDIOVASCULAR DISEASE: ICD-10-CM

## 2020-08-19 DIAGNOSIS — Z13.6 ENCOUNTER FOR LIPID SCREENING FOR CARDIOVASCULAR DISEASE: ICD-10-CM

## 2020-08-19 DIAGNOSIS — K58.0 IRRITABLE BOWEL SYNDROME WITH DIARRHEA: ICD-10-CM

## 2020-08-19 DIAGNOSIS — R53.82 CHRONIC FATIGUE, UNSPECIFIED: ICD-10-CM

## 2020-08-19 DIAGNOSIS — R79.82 ELEVATED C-REACTIVE PROTEIN (CRP): ICD-10-CM

## 2020-08-19 DIAGNOSIS — Z12.31 VISIT FOR SCREENING MAMMOGRAM: ICD-10-CM

## 2020-08-19 DIAGNOSIS — Z01.419 ENCOUNTER FOR GYNECOLOGICAL EXAMINATION WITHOUT ABNORMAL FINDING: Primary | ICD-10-CM

## 2020-08-19 DIAGNOSIS — N92.0 MENORRHAGIA WITH REGULAR CYCLE: ICD-10-CM

## 2020-08-19 DIAGNOSIS — R70.0 ELEVATED SED RATE: ICD-10-CM

## 2020-08-19 DIAGNOSIS — Z13.228 SCREENING FOR METABOLIC DISORDER: ICD-10-CM

## 2020-08-19 LAB
CRP SERPL-MCNC: 9.1 MG/L (ref 0–8)
ERYTHROCYTE [DISTWIDTH] IN BLOOD BY AUTOMATED COUNT: 13 % (ref 10–15)
ERYTHROCYTE [SEDIMENTATION RATE] IN BLOOD BY WESTERGREN METHOD: 38 MM/H (ref 0–20)
HCT VFR BLD AUTO: 36.5 % (ref 35–47)
HGB BLD-MCNC: 12 G/DL (ref 11.7–15.7)
MCH RBC QN AUTO: 29.4 PG (ref 26.5–33)
MCHC RBC AUTO-ENTMCNC: 32.9 G/DL (ref 31.5–36.5)
MCV RBC AUTO: 90 FL (ref 78–100)
PLATELET # BLD AUTO: 313 10E9/L (ref 150–450)
RBC # BLD AUTO: 4.08 10E12/L (ref 3.8–5.2)
VIT B12 SERPL-MCNC: 1034 PG/ML (ref 193–986)
WBC # BLD AUTO: 9.6 10E9/L (ref 4–11)

## 2020-08-19 PROCEDURE — 84443 ASSAY THYROID STIM HORMONE: CPT | Performed by: OBSTETRICS & GYNECOLOGY

## 2020-08-19 PROCEDURE — 36415 COLL VENOUS BLD VENIPUNCTURE: CPT | Performed by: OBSTETRICS & GYNECOLOGY

## 2020-08-19 PROCEDURE — 87624 HPV HI-RISK TYP POOLED RSLT: CPT | Performed by: OBSTETRICS & GYNECOLOGY

## 2020-08-19 PROCEDURE — 99396 PREV VISIT EST AGE 40-64: CPT | Performed by: OBSTETRICS & GYNECOLOGY

## 2020-08-19 PROCEDURE — 99213 OFFICE O/P EST LOW 20 MIN: CPT | Mod: 25 | Performed by: OBSTETRICS & GYNECOLOGY

## 2020-08-19 PROCEDURE — 80053 COMPREHEN METABOLIC PANEL: CPT | Performed by: OBSTETRICS & GYNECOLOGY

## 2020-08-19 PROCEDURE — 86140 C-REACTIVE PROTEIN: CPT | Performed by: OBSTETRICS & GYNECOLOGY

## 2020-08-19 PROCEDURE — 80061 LIPID PANEL: CPT | Performed by: OBSTETRICS & GYNECOLOGY

## 2020-08-19 PROCEDURE — 83550 IRON BINDING TEST: CPT | Performed by: OBSTETRICS & GYNECOLOGY

## 2020-08-19 PROCEDURE — 77063 BREAST TOMOSYNTHESIS BI: CPT | Mod: TC

## 2020-08-19 PROCEDURE — G0145 SCR C/V CYTO,THINLAYER,RESCR: HCPCS | Performed by: OBSTETRICS & GYNECOLOGY

## 2020-08-19 PROCEDURE — 77067 SCR MAMMO BI INCL CAD: CPT | Mod: TC

## 2020-08-19 PROCEDURE — 83540 ASSAY OF IRON: CPT | Performed by: OBSTETRICS & GYNECOLOGY

## 2020-08-19 PROCEDURE — 82607 VITAMIN B-12: CPT | Performed by: OBSTETRICS & GYNECOLOGY

## 2020-08-19 PROCEDURE — 85027 COMPLETE CBC AUTOMATED: CPT | Performed by: OBSTETRICS & GYNECOLOGY

## 2020-08-19 PROCEDURE — 82306 VITAMIN D 25 HYDROXY: CPT | Performed by: OBSTETRICS & GYNECOLOGY

## 2020-08-19 PROCEDURE — 85652 RBC SED RATE AUTOMATED: CPT | Performed by: OBSTETRICS & GYNECOLOGY

## 2020-08-19 PROCEDURE — 82728 ASSAY OF FERRITIN: CPT | Performed by: OBSTETRICS & GYNECOLOGY

## 2020-08-19 ASSESSMENT — PATIENT HEALTH QUESTIONNAIRE - PHQ9
5. POOR APPETITE OR OVEREATING: NOT AT ALL
SUM OF ALL RESPONSES TO PHQ QUESTIONS 1-9: 2

## 2020-08-19 ASSESSMENT — ANXIETY QUESTIONNAIRES
2. NOT BEING ABLE TO STOP OR CONTROL WORRYING: NOT AT ALL
3. WORRYING TOO MUCH ABOUT DIFFERENT THINGS: NOT AT ALL
IF YOU CHECKED OFF ANY PROBLEMS ON THIS QUESTIONNAIRE, HOW DIFFICULT HAVE THESE PROBLEMS MADE IT FOR YOU TO DO YOUR WORK, TAKE CARE OF THINGS AT HOME, OR GET ALONG WITH OTHER PEOPLE: NOT DIFFICULT AT ALL
6. BECOMING EASILY ANNOYED OR IRRITABLE: NOT AT ALL
7. FEELING AFRAID AS IF SOMETHING AWFUL MIGHT HAPPEN: NOT AT ALL
1. FEELING NERVOUS, ANXIOUS, OR ON EDGE: NOT AT ALL
5. BEING SO RESTLESS THAT IT IS HARD TO SIT STILL: NOT AT ALL
GAD7 TOTAL SCORE: 0

## 2020-08-19 ASSESSMENT — MIFFLIN-ST. JEOR: SCORE: 1431.63

## 2020-08-20 LAB
ALBUMIN SERPL-MCNC: 3.5 G/DL (ref 3.4–5)
ALP SERPL-CCNC: 51 U/L (ref 40–150)
ALT SERPL W P-5'-P-CCNC: 24 U/L (ref 0–50)
ANION GAP SERPL CALCULATED.3IONS-SCNC: 7 MMOL/L (ref 3–14)
AST SERPL W P-5'-P-CCNC: 11 U/L (ref 0–45)
BILIRUB SERPL-MCNC: 0.4 MG/DL (ref 0.2–1.3)
BUN SERPL-MCNC: 10 MG/DL (ref 7–30)
CALCIUM SERPL-MCNC: 8.7 MG/DL (ref 8.5–10.1)
CHLORIDE SERPL-SCNC: 105 MMOL/L (ref 94–109)
CHOLEST SERPL-MCNC: 200 MG/DL
CO2 SERPL-SCNC: 25 MMOL/L (ref 20–32)
CREAT SERPL-MCNC: 0.65 MG/DL (ref 0.52–1.04)
DEPRECATED CALCIDIOL+CALCIFEROL SERPL-MC: 53 UG/L (ref 20–75)
FERRITIN SERPL-MCNC: 80 NG/ML (ref 12–150)
GFR SERPL CREATININE-BSD FRML MDRD: >90 ML/MIN/{1.73_M2}
GLUCOSE SERPL-MCNC: 76 MG/DL (ref 70–99)
HDLC SERPL-MCNC: 71 MG/DL
IRON SATN MFR SERPL: 26 % (ref 15–46)
IRON SERPL-MCNC: 80 UG/DL (ref 35–180)
LDLC SERPL CALC-MCNC: 107 MG/DL
NONHDLC SERPL-MCNC: 129 MG/DL
POTASSIUM SERPL-SCNC: 3.9 MMOL/L (ref 3.4–5.3)
PROT SERPL-MCNC: 7.3 G/DL (ref 6.8–8.8)
SODIUM SERPL-SCNC: 137 MMOL/L (ref 133–144)
TIBC SERPL-MCNC: 305 UG/DL (ref 240–430)
TRIGL SERPL-MCNC: 109 MG/DL
TSH SERPL DL<=0.005 MIU/L-ACNC: 1.32 MU/L (ref 0.4–4)

## 2020-08-20 ASSESSMENT — ANXIETY QUESTIONNAIRES: GAD7 TOTAL SCORE: 0

## 2020-08-21 LAB
COPATH REPORT: NORMAL
PAP: NORMAL

## 2020-08-25 LAB
FINAL DIAGNOSIS: NORMAL
HPV HR 12 DNA CVX QL NAA+PROBE: NEGATIVE
HPV16 DNA SPEC QL NAA+PROBE: NEGATIVE
HPV18 DNA SPEC QL NAA+PROBE: NEGATIVE
SPECIMEN DESCRIPTION: NORMAL
SPECIMEN SOURCE CVX/VAG CYTO: NORMAL

## 2020-11-29 ENCOUNTER — HEALTH MAINTENANCE LETTER (OUTPATIENT)
Age: 45
End: 2020-11-29

## 2021-03-02 ENCOUNTER — MYC MEDICAL ADVICE (OUTPATIENT)
Dept: OBGYN | Facility: CLINIC | Age: 46
End: 2021-03-02

## 2021-03-02 DIAGNOSIS — H04.129 DRY EYE: ICD-10-CM

## 2021-03-02 DIAGNOSIS — M25.50 ARTHRALGIA, UNSPECIFIED JOINT: Primary | ICD-10-CM

## 2021-03-02 NOTE — TELEPHONE ENCOUNTER
I can't recall if she's ever seen a rheumatologist or not. If she hasn't then she should try to see someone here in town at the rheumatology and arthritis clinic and not wait for [facility]. Can still see [facility] eventually but no reason to not start looking into things.    I ordered the hla and a couple other rheum tests. There very well may be more that rheum would want but it would be a starting point.  She can make a lab appointment but should make an appointment for next available with rheum.

## 2021-03-02 NOTE — TELEPHONE ENCOUNTER
Routing pt Neprishart message to provider to advise.    Sabiha Martinez RN on 3/2/2021 at 12:56 PM

## 2021-03-08 DIAGNOSIS — M25.50 ARTHRALGIA, UNSPECIFIED JOINT: ICD-10-CM

## 2021-03-08 DIAGNOSIS — H04.129 DRY EYE: ICD-10-CM

## 2021-03-08 PROCEDURE — 36415 COLL VENOUS BLD VENIPUNCTURE: CPT | Performed by: OBSTETRICS & GYNECOLOGY

## 2021-03-08 PROCEDURE — 86038 ANTINUCLEAR ANTIBODIES: CPT | Performed by: OBSTETRICS & GYNECOLOGY

## 2021-03-08 PROCEDURE — 86235 NUCLEAR ANTIGEN ANTIBODY: CPT | Performed by: OBSTETRICS & GYNECOLOGY

## 2021-03-08 PROCEDURE — 86039 ANTINUCLEAR ANTIBODIES (ANA): CPT | Performed by: OBSTETRICS & GYNECOLOGY

## 2021-03-08 PROCEDURE — 81374 HLA I TYPING 1 ANTIGEN LR: CPT | Performed by: OBSTETRICS & GYNECOLOGY

## 2021-03-09 LAB
ANA PAT SER IF-IMP: ABNORMAL
ANA SER QL IF: ABNORMAL
ANA TITR SER IF: ABNORMAL {TITER}
ENA SS-A IGG SER IA-ACNC: <0.2 AI (ref 0–0.9)

## 2021-03-12 LAB
B LOCUS: NORMAL
B27TEST METHOD: NORMAL

## 2021-03-24 NOTE — TELEPHONE ENCOUNTER
This encounter is combined with encounter dated 3/15/21.  Please include this encounter with that encounter.    Closing.    Bisi Medina RN on 3/24/2021 at 10:45 AM

## 2021-04-12 ENCOUNTER — MEDICAL CORRESPONDENCE (OUTPATIENT)
Dept: HEALTH INFORMATION MANAGEMENT | Facility: CLINIC | Age: 46
End: 2021-04-12

## 2021-06-14 ENCOUNTER — TRANSFERRED RECORDS (OUTPATIENT)
Dept: HEALTH INFORMATION MANAGEMENT | Facility: CLINIC | Age: 46
End: 2021-06-14

## 2021-06-28 ENCOUNTER — TRANSFERRED RECORDS (OUTPATIENT)
Dept: HEALTH INFORMATION MANAGEMENT | Facility: CLINIC | Age: 46
End: 2021-06-28

## 2021-06-29 NOTE — TELEPHONE ENCOUNTER
Action    Action Taken 6-29: records [facility] for pt written list of symptoms (attached at bottom)

## 2021-07-12 ASSESSMENT — ENCOUNTER SYMPTOMS
TASTE DISTURBANCE: 0
MUSCLE CRAMPS: 0
TREMORS: 0
SEIZURES: 0
VOMITING: 0
DIZZINESS: 1
STIFFNESS: 1
SINUS CONGESTION: 0
BLOOD IN STOOL: 0
FEVER: 0
SORE THROAT: 0
NUMBNESS: 1
NECK MASS: 0
HYPERTENSION: 0
JAUNDICE: 0
ABDOMINAL PAIN: 1
ORTHOPNEA: 0
NECK PAIN: 0
BRUISES/BLEEDS EASILY: 1
SLEEP DISTURBANCES DUE TO BREATHING: 0
PARALYSIS: 0
NIGHT SWEATS: 1
WEIGHT GAIN: 1
WEAKNESS: 0
MEMORY LOSS: 0
RECTAL PAIN: 1
BACK PAIN: 1
HYPOTENSION: 0
SKIN CHANGES: 0
HEARTBURN: 1
SWOLLEN GLANDS: 0
PALPITATIONS: 1
SMELL DISTURBANCE: 0
CONSTIPATION: 1
WEIGHT LOSS: 1
HOARSE VOICE: 0
HALLUCINATIONS: 0
LEG PAIN: 0
TROUBLE SWALLOWING: 1
FATIGUE: 1
SINUS PAIN: 0
JOINT SWELLING: 1
BLOATING: 1
POLYPHAGIA: 0
DISTURBANCES IN COORDINATION: 0
LOSS OF CONSCIOUSNESS: 0
EYE PAIN: 1
EYE IRRITATION: 1
DOUBLE VISION: 0
INCREASED ENERGY: 1
HEADACHES: 1
POOR WOUND HEALING: 1
SPEECH CHANGE: 0
DIARRHEA: 1
MUSCLE WEAKNESS: 0
MYALGIAS: 0
DECREASED APPETITE: 1
ARTHRALGIAS: 1
BOWEL INCONTINENCE: 0
NAUSEA: 1
SYNCOPE: 0
EYE REDNESS: 1
CHILLS: 0
LIGHT-HEADEDNESS: 1
POLYDIPSIA: 0
NAIL CHANGES: 0
ALTERED TEMPERATURE REGULATION: 1
TINGLING: 1
EXERCISE INTOLERANCE: 1

## 2021-07-15 ENCOUNTER — TRANSFERRED RECORDS (OUTPATIENT)
Dept: HEALTH INFORMATION MANAGEMENT | Facility: CLINIC | Age: 46
End: 2021-07-15

## 2021-07-19 ENCOUNTER — OFFICE VISIT (OUTPATIENT)
Dept: CARDIOLOGY | Facility: CLINIC | Age: 46
End: 2021-07-19
Attending: INTERNAL MEDICINE
Payer: COMMERCIAL

## 2021-07-19 ENCOUNTER — PRE VISIT (OUTPATIENT)
Dept: CARDIOLOGY | Facility: CLINIC | Age: 46
End: 2021-07-19

## 2021-07-19 ENCOUNTER — TELEPHONE (OUTPATIENT)
Dept: CARDIOLOGY | Facility: CLINIC | Age: 46
End: 2021-07-19

## 2021-07-19 VITALS — BODY MASS INDEX: 37.03 KG/M2 | OXYGEN SATURATION: 99 % | WEIGHT: 209 LBS | HEIGHT: 63 IN

## 2021-07-19 DIAGNOSIS — G90.A POTS (POSTURAL ORTHOSTATIC TACHYCARDIA SYNDROME): Primary | ICD-10-CM

## 2021-07-19 DIAGNOSIS — I87.1 VENOUS THORACIC OUTLET SYNDROME OF RIGHT SUBCLAVIAN VEIN: ICD-10-CM

## 2021-07-19 PROCEDURE — 93005 ELECTROCARDIOGRAM TRACING: CPT

## 2021-07-19 PROCEDURE — G0463 HOSPITAL OUTPT CLINIC VISIT: HCPCS | Mod: 25

## 2021-07-19 PROCEDURE — 99204 OFFICE O/P NEW MOD 45 MIN: CPT | Mod: GC | Performed by: INTERNAL MEDICINE

## 2021-07-19 RX ORDER — SODIUM CHLORIDE 9 MG/ML
INJECTION, SOLUTION INTRAVENOUS CONTINUOUS
Status: CANCELLED | OUTPATIENT
Start: 2021-07-19

## 2021-07-19 ASSESSMENT — MIFFLIN-ST. JEOR: SCORE: 1562.15

## 2021-07-19 ASSESSMENT — PAIN SCALES - GENERAL: PAINLEVEL: NO PAIN (0)

## 2021-07-19 NOTE — NURSING NOTE
Chief Complaint   Patient presents with     New Patient     dysautonomia work up referral from [facility] were taken and medications where reconciled. EKG was performed   BRENNAN Ngo  1:50 PM

## 2021-07-19 NOTE — LETTER
"7/19/2021      RE: Damaris Sarmiento  [address] [address]       Dear Colleague,    Thank you for the opportunity to participate in the care of your patient, Damaris Sarmiento, at the [facility] [place] at [facility]. Please see a copy of my visit note below.          Clinical Cardiac Electrophysiology    Chief Complaint: Postural tachycardia syndrome (POTS)     HPI July 19, 2021:     Patient reports exerperiencing fast heart rates in the mornings or evenings. She says that her heart rate can go up from 60 to 90s while laying down. She adds that is difficult to stand up when she wakes up. She needs to meditate at least 30 min before incorporating into her daily activities otherwise she feels bad. During some of her fast heart rate episodes, she also feels dizzy and lightheaded. And she has been close enough to fall. No syncope or lightheadeness. Her symptoms started 2 years ago. Before that, she was able to have a \"normal life\". She used to go for long bike rides with her . But this is no longer possible due to her symptoms.     She says having sleeping difficulties. She feels puffy. She reports rapid weight gain at least 15 pounds during the previous months. She thinks she has fluid retention. She thinks that COVID-19 vaccine (4/2021) might've triggered all the aforementioned symptoms.     One year ago, she discovered in the social media a POTS group. She felt identified with those patients. Since then   She has been trying to increase her water and salt intake (sodium chloride). She doesn't know if she is taking enough sodium chloride pills but she feels that her symptoms have remarkably improved \" game changer\" since she started these pills.      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      No family hx of immunologic conditios. Her sister is experiencing similar symptoms and " she had a recent stroke.      Current Outpatient Medications   Medication Sig Dispense Refill     acetaminophen (TYLENOL 8 HOUR ARTHRITIS PAIN) 650 MG CR tablet        BABY ASPIRIN PO        budesonide (RINOCORT AQUA) 32 MCG/ACT nasal spray Spray 1 spray into both nostrils daily       CLARITIN 10 MG OR TABS 1 TABLET DAILY       ketotifen (ZADITOR/REFRESH ANTI-ITCH) 0.025 % SOLN ophthalmic solution 1 drop       methylcellulose (CITRUCEL) 500 MG TABS tablet Take 100 mg by mouth daily       mupirocin (BACTROBAN) 2 % ointment PIPER A SMALL AMOUNT TO NOSTRILS ONCE OR BID  0     polyethylene glycol (MIRALAX) powder Take 1 capful by mouth daily       Pseudoephedrine HCl (SUDAFED PO)        SODIUM CHLORIDE-SODIUM BICARB NA        UNABLE TO FIND MEDICATION NAME: Salt Stick Vitassium       vitamin B-12 (CYANOCOBALAMIN) 1000 MCG tablet        vitamin D3 (CHOLECALCIFEROL) 2000 units (50 mcg) tablet Take 1 tablet by mouth         Past Medical History:   Diagnosis Date     Asthma      CARDIOVASCULAR SCREENING; LDL GOAL LESS THAN 160 10/31/2010     Discoid lupus      GERD (gastroesophageal reflux disease) 10/4/2012     Polycystic ovarian syndrome      RW ALLERGIES/IMMUNOLOGY (ABSTRACTED)      Sinusitis      Subclavian vein thrombosis, right (H) 2000       Past Surgical History:   Procedure Laterality Date     ENDOSCOPIC SINUS SURGERY  7/16/2014    Procedure: ENDOSCOPIC SINUS SURGERY;  Surgeon: Suzi Vieyra MD;  Location: [facility]     ORTHOPEDIC SURGERY       TURBINECTOMY  7/16/2014    Procedure: TURBINECTOMY;  Surgeon: Suzi Vieyra MD;  Location: [facility]     Z NONSPECIFIC PROCEDURE  12/2000    resection of first rib     [facility] NONSPECIFIC PROCEDURE  1992    left foot surgery       Family History   Problem Relation Age of Onset     Osteoporosis Mother      Musculoskeletal Disorder Father         Paget's disease      Hyperlipidemia Father      EYE* Maternal Grandfather         glaucoma     Cancer Maternal Grandmother          "lung     Cancer - colorectal Paternal Grandfather      Prostate Cancer Paternal Grandfather      Allergies Sister      Other - See Comments Sister         endometriosis     Cerebrovascular Disease Sister 47        stroke after recent ocp start for perimenopausal sx     Musculoskeletal Disorder Paternal Uncle         Paget's disease       Social History     Tobacco Use     Smoking status: Never Smoker     Smokeless tobacco: Never Used   Substance Use Topics     Alcohol use: Yes     Alcohol/week: 0.0 standard drinks     Comment: rare       Allergies   Allergen Reactions     Cat Hair Extract Hives     Dogs Hives     wheezing     Dust Mites      Other reaction(s): Wheezing  eye mucous     Erythromycin      Gluten Meal      Other reaction(s): GI intolerance     Grass Hives     eye mucous     Pollen Extract      Other reaction(s): Other (see comments)  Tree pollen-hives, eye mucous     Ragweeds Hives     eye mucous     Trees      Other reaction(s): Other (see comments)  Sinus issues     Wheat Bran Hives     Other reaction(s): GI intolerance       Physical examination:  Ht 1.6 m (5' 3\")   Wt 94.8 kg (209 lb)   BMI 37.02 kg/m     Orthostatic Vitals  BP Pulse Position Site Cuff Size Time Date   123/72 80 Supine Left arm Adult Large  1:21 PM 7/19/2021   132/82 101 Standing Left arm Adult Large  1:41 PM 7/19/2021   124/78 97 Standing Left arm Adult Large  1:42 PM 7/19/2021   142/86 --- Standing Left arm Adult Large  1:44 PM 7/19/2021     Pain Information  Score Location Time Date   No Pain (0) ---  1:21 PM 7/19/2021   Comment: No SOB   No repeat blood pressure data filed.  No peak flow data filed.      GENERAL APPEARANCE: healthy, alert and no distress  NECK: no venous distention  RESPIRATORY: lungs clear to auscultation - no rales, rhonchi or wheezes  CARDIOVASCULAR: regular, normal S1 S2, no S3 or S4 and no murmur, click or rub, precordium quiet with normal PMI  ABDOMEN: soft, non tender with normal bowel sounds "   EXTREMITIES: no edema      Laboratory and diagnostic data personally reviewed July 19, 2021:    Results for SIGIFREDO MATTHEW (MRN [id number]) as of 7/19/2021 13:37   Ref. Range 8/19/2020 11:41   Sodium Latest Ref Range: 133 - 144 mmol/L 137   Potassium Latest Ref Range: 3.4 - 5.3 mmol/L 3.9   Chloride Latest Ref Range: 94 - 109 mmol/L 105   Carbon Dioxide Latest Ref Range: 20 - 32 mmol/L 25   Urea Nitrogen Latest Ref Range: 7 - 30 mg/dL 10   Creatinine Latest Ref Range: 0.52 - 1.04 mg/dL 0.65   GFR Estimate Latest Ref Range: >60 mL/min/1.73_m2 >90   GFR Estimate If Black Latest Ref Range: >60 mL/min/1.73_m2 >90   Calcium Latest Ref Range: 8.5 - 10.1 mg/dL 8.7   Anion Gap Latest Ref Range: 3 - 14 mmol/L 7   Albumin Latest Ref Range: 3.4 - 5.0 g/dL 3.5   Protein Total Latest Ref Range: 6.8 - 8.8 g/dL 7.3   Bilirubin Total Latest Ref Range: 0.2 - 1.3 mg/dL 0.4   Alkaline Phosphatase Latest Ref Range: 40 - 150 U/L 51   ALT Latest Ref Range: 0 - 50 U/L 24   AST Latest Ref Range: 0 - 45 U/L 11   Cholesterol Latest Ref Range: <200 mg/dL 200 (H)   CRP Inflammation Latest Ref Range: 0.0 - 8.0 mg/L 9.1 (H)   Ferritin Latest Ref Range: 12 - 150 ng/mL 80   HDL Cholesterol Latest Ref Range: >49 mg/dL 71   Iron Latest Ref Range: 35 - 180 ug/dL 80   Iron Binding Cap Latest Ref Range: 240 - 430 ug/dL 305   Iron Saturation Index Latest Ref Range: 15 - 46 % 26   LDL Cholesterol Calculated Latest Ref Range: <100 mg/dL 107 (H)   Non HDL Cholesterol Latest Ref Range: <130 mg/dL 129   Triglycerides Latest Ref Range: <150 mg/dL 109   TSH Latest Ref Range: 0.40 - 4.00 mU/L 1.32   Vitamin B12 Latest Ref Range: 193 - 986 pg/mL 1,034 (H)   Vitamin D Deficiency screening Latest Ref Range: 20 - 75 ug/L 53   Glucose Latest Ref Range: 70 - 99 mg/dL 76   WBC Latest Ref Range: 4.0 - 11.0 10e9/L 9.6   Hemoglobin Latest Ref Range: 11.7 - 15.7 g/dL 12.0   Hematocrit Latest Ref Range: 35.0 - 47.0 % 36.5   Platelet Count Latest Ref Range: 150 - 450  10e9/L 313   RBC Count Latest Ref Range: 3.8 - 5.2 10e12/L 4.08   MCV Latest Ref Range: 78 - 100 fl 90   MCH Latest Ref Range: 26.5 - 33.0 pg 29.4   MCHC Latest Ref Range: 31.5 - 36.5 g/dL 32.9   RDW Latest Ref Range: 10.0 - 15.0 % 13.0   Sed Rate Latest Ref Range: 0 - 20 mm/h 38 (H)       Assessment and recommendations:  Postural lightheadedness   Patient reports fast heart rate while resting associated with lightheadedness. She has some limitations in her daily activities. Based on BP and HR measurements today, she does not accomplish criteria for postural tachycardia syndrome. Although we can't rule out POTS with tilt table test. We will arrange this.    -Recommended to add 1 pill of sodium chloride with her meals.   -Encouraged to use compression garment  -Order EP tilt table test  -Follow-up in 6 weeks with Dr. Gilmore    -We also referred this patient to vascular surgery for evaluation of lymphedema in her right arm.       Jorge Reyes Castro, MD  Cardiology Fellow, PG[facility]    Attending: Patient seen and examined with Dr. Reyes Castro. The history and physical findings are accurate as recorded. My additional findings, if any, have been incorporated into the body of the note. All labs, imaging studies, ECG and telemetry data have been reviewed personally. The assessment and plans outlined reflect our joint decision making.    Ilya Gilmore MD

## 2021-07-19 NOTE — PROGRESS NOTES
"      Clinical Cardiac Electrophysiology    Chief Complaint: Postural tachycardia syndrome (POTS)     HPI July 19, 2021:     Patient reports exerperiencing fast heart rates in the mornings or evenings. She says that her heart rate can go up from 60 to 90s while laying down. She adds that is difficult to stand up when she wakes up. She needs to meditate at least 30 min before incorporating into her daily activities otherwise she feels bad. During some of her fast heart rate episodes, she also feels dizzy and lightheaded. And she has been close enough to fall. No syncope or lightheadeness. Her symptoms started 2 years ago. Before that, she was able to have a \"normal life\". She used to go for long bike rides with her . But this is no longer possible due to her symptoms.     She says having sleeping difficulties. She feels puffy. She reports rapid weight gain at least 15 pounds during the previous months. She thinks she has fluid retention. She thinks that COVID-19 vaccine (4/2021) might've triggered all the aforementioned symptoms.     One year ago, she discovered in the social media a POTS group. She felt identified with those patients. Since then   She has been trying to increase her water and salt intake (sodium chloride). She doesn't know if she is taking enough sodium chloride pills but she feels that her symptoms have remarkably improved \" game changer\" since she started these pills.      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      No family hx of immunologic conditios. Her sister is experiencing similar symptoms and she had a recent stroke.      Current Outpatient Medications   Medication Sig Dispense Refill     acetaminophen (TYLENOL 8 HOUR ARTHRITIS PAIN) 650 MG CR tablet        BABY ASPIRIN PO        budesonide (RINOCORT AQUA) 32 MCG/ACT nasal spray Spray 1 spray into both nostrils daily       CLARITIN 10 MG OR TABS 1 TABLET DAILY       ketotifen " (ZADITOR/REFRESH ANTI-ITCH) 0.025 % SOLN ophthalmic solution 1 drop       methylcellulose (CITRUCEL) 500 MG TABS tablet Take 100 mg by mouth daily       mupirocin (BACTROBAN) 2 % ointment PIPER A SMALL AMOUNT TO NOSTRILS ONCE OR BID  0     polyethylene glycol (MIRALAX) powder Take 1 capful by mouth daily       Pseudoephedrine HCl (SUDAFED PO)        SODIUM CHLORIDE-SODIUM BICARB NA        UNABLE TO FIND MEDICATION NAME: Salt Stick Vitassium       vitamin B-12 (CYANOCOBALAMIN) 1000 MCG tablet        vitamin D3 (CHOLECALCIFEROL) 2000 units (50 mcg) tablet Take 1 tablet by mouth         Past Medical History:   Diagnosis Date     Asthma      CARDIOVASCULAR SCREENING; LDL GOAL LESS THAN 160 10/31/2010     Discoid lupus      GERD (gastroesophageal reflux disease) 10/4/2012     Polycystic ovarian syndrome      RW ALLERGIES/IMMUNOLOGY (ABSTRACTED)      Sinusitis      Subclavian vein thrombosis, right (H) 2000       Past Surgical History:   Procedure Laterality Date     ENDOSCOPIC SINUS SURGERY  7/16/2014    Procedure: ENDOSCOPIC SINUS SURGERY;  Surgeon: Suzi Vieyra MD;  Location: [facility]     ORTHOPEDIC SURGERY       TURBINECTOMY  7/16/2014    Procedure: TURBINECTOMY;  Surgeon: Suzi Vieyra MD;  Location: [facility]     ZZC NONSPECIFIC PROCEDURE  12/2000    resection of first rib     ZZC NONSPECIFIC PROCEDURE  1992    left foot surgery       Family History   Problem Relation Age of Onset     Osteoporosis Mother      Musculoskeletal Disorder Father         Paget's disease      Hyperlipidemia Father      EYE* Maternal Grandfather         glaucoma     Cancer Maternal Grandmother         lung     Cancer - colorectal Paternal Grandfather      Prostate Cancer Paternal Grandfather      Allergies Sister      Other - See Comments Sister         endometriosis     Cerebrovascular Disease Sister 47        stroke after recent ocp start for perimenopausal sx     Musculoskeletal Disorder Paternal Uncle         Paget's disease  "      Social History     Tobacco Use     Smoking status: Never Smoker     Smokeless tobacco: Never Used   Substance Use Topics     Alcohol use: Yes     Alcohol/week: 0.0 standard drinks     Comment: rare       Allergies   Allergen Reactions     Cat Hair Extract Hives     Dogs Hives     wheezing     Dust Mites      Other reaction(s): Wheezing  eye mucous     Erythromycin      Gluten Meal      Other reaction(s): GI intolerance     Grass Hives     eye mucous     Pollen Extract      Other reaction(s): Other (see comments)  Tree pollen-hives, eye mucous     Ragweeds Hives     eye mucous     Trees      Other reaction(s): Other (see comments)  Sinus issues     Wheat Bran Hives     Other reaction(s): GI intolerance       Physical examination:  Ht 1.6 m (5' 3\")   Wt 94.8 kg (209 lb)   BMI 37.02 kg/m     Orthostatic Vitals  BP Pulse Position Site Cuff Size Time Date   123/72 80 Supine Left arm Adult Large  1:21 PM 7/19/2021   132/82 101 Standing Left arm Adult Large  1:41 PM 7/19/2021   124/78 97 Standing Left arm Adult Large  1:42 PM 7/19/2021   142/86 --- Standing Left arm Adult Large  1:44 PM 7/19/2021     Pain Information  Score Location Time Date   No Pain (0) ---  1:21 PM 7/19/2021   Comment: No SOB   No repeat blood pressure data filed.  No peak flow data filed.      GENERAL APPEARANCE: healthy, alert and no distress  NECK: no venous distention  RESPIRATORY: lungs clear to auscultation - no rales, rhonchi or wheezes  CARDIOVASCULAR: regular, normal S1 S2, no S3 or S4 and no murmur, click or rub, precordium quiet with normal PMI  ABDOMEN: soft, non tender with normal bowel sounds   EXTREMITIES: no edema      Laboratory and diagnostic data personally reviewed July 19, 2021:    Results for SIGIFREDO MATTHEW (MRN [id number]) as of 7/19/2021 13:37   Ref. Range 8/19/2020 11:41   Sodium Latest Ref Range: 133 - 144 mmol/L 137   Potassium Latest Ref Range: 3.4 - 5.3 mmol/L 3.9   Chloride Latest Ref Range: 94 - 109 mmol/L 105 "   Carbon Dioxide Latest Ref Range: 20 - 32 mmol/L 25   Urea Nitrogen Latest Ref Range: 7 - 30 mg/dL 10   Creatinine Latest Ref Range: 0.52 - 1.04 mg/dL 0.65   GFR Estimate Latest Ref Range: >60 mL/min/1.73_m2 >90   GFR Estimate If Black Latest Ref Range: >60 mL/min/1.73_m2 >90   Calcium Latest Ref Range: 8.5 - 10.1 mg/dL 8.7   Anion Gap Latest Ref Range: 3 - 14 mmol/L 7   Albumin Latest Ref Range: 3.4 - 5.0 g/dL 3.5   Protein Total Latest Ref Range: 6.8 - 8.8 g/dL 7.3   Bilirubin Total Latest Ref Range: 0.2 - 1.3 mg/dL 0.4   Alkaline Phosphatase Latest Ref Range: 40 - 150 U/L 51   ALT Latest Ref Range: 0 - 50 U/L 24   AST Latest Ref Range: 0 - 45 U/L 11   Cholesterol Latest Ref Range: <200 mg/dL 200 (H)   CRP Inflammation Latest Ref Range: 0.0 - 8.0 mg/L 9.1 (H)   Ferritin Latest Ref Range: 12 - 150 ng/mL 80   HDL Cholesterol Latest Ref Range: >49 mg/dL 71   Iron Latest Ref Range: 35 - 180 ug/dL 80   Iron Binding Cap Latest Ref Range: 240 - 430 ug/dL 305   Iron Saturation Index Latest Ref Range: 15 - 46 % 26   LDL Cholesterol Calculated Latest Ref Range: <100 mg/dL 107 (H)   Non HDL Cholesterol Latest Ref Range: <130 mg/dL 129   Triglycerides Latest Ref Range: <150 mg/dL 109   TSH Latest Ref Range: 0.40 - 4.00 mU/L 1.32   Vitamin B12 Latest Ref Range: 193 - 986 pg/mL 1,034 (H)   Vitamin D Deficiency screening Latest Ref Range: 20 - 75 ug/L 53   Glucose Latest Ref Range: 70 - 99 mg/dL 76   WBC Latest Ref Range: 4.0 - 11.0 10e9/L 9.6   Hemoglobin Latest Ref Range: 11.7 - 15.7 g/dL 12.0   Hematocrit Latest Ref Range: 35.0 - 47.0 % 36.5   Platelet Count Latest Ref Range: 150 - 450 10e9/L 313   RBC Count Latest Ref Range: 3.8 - 5.2 10e12/L 4.08   MCV Latest Ref Range: 78 - 100 fl 90   MCH Latest Ref Range: 26.5 - 33.0 pg 29.4   MCHC Latest Ref Range: 31.5 - 36.5 g/dL 32.9   RDW Latest Ref Range: 10.0 - 15.0 % 13.0   Sed Rate Latest Ref Range: 0 - 20 mm/h 38 (H)       Assessment and recommendations:  Postural  lightheadedness   Patient reports fast heart rate while resting associated with lightheadedness. She has some limitations in her daily activities. Based on BP and HR measurements today, she does not accomplish criteria for postural tachycardia syndrome. Although we can't rule out POTS with tilt table test. We will arrange this.    -Recommended to add 1 pill of sodium chloride with her meals.   -Encouraged to use compression garment  -Order EP tilt table test  -Follow-up in 6 weeks with Dr. Gilmore    -We also referred this patient to vascular surgery for evaluation of lymphedema in her right arm.       Jorge Reyes Castro, MD  Cardiology Fellow, PG4    Attending: Patient seen and examined with Dr. Reyes Castro. The history and physical findings are accurate as recorded. My additional findings, if any, have been incorporated into the body of the note. All labs, imaging studies, ECG and telemetry data have been reviewed personally. The assessment and plans outlined reflect our joint decision making.    Ilya Gilmore MD

## 2021-07-19 NOTE — PATIENT INSTRUCTIONS
You were seen in the Electrophysiology Clinic today by: Dr Gilmore    Plan:     Tilt table testing    Follow up with Dr Gilmore 6-8 weeks virtually    Referral to Lymphedema Clinic        You are scheduled for a Tilt Table/Autonomic study with Dr Gilmore      You will need to undergo a COVID-19 PCR swab test within 4 days of procedure. You will receive a phone call with more information. If you do not hear from the COVID scheduling team, please call: [phone] to schedule.    Below are instructions, if you have questions please contact our office.     1. You should arrive at the [facility] at _______  ([address], [place]: [phone]).    2. Report to the GOLD waiting room. Your procedure will be done in the Catheterization Lab.     3. Wear comfortable clothing. (sweat/yoga pants, t-shirt). Please allow 3-4 hours for this procedure to be completed.     4. Do not eat or drink ANYTHING for 6 hours prior to arrival.     5. The morning of your procedure, you may take any scheduled medications with a SIP of water- unless you were instructed differently by your physician.   Do not take any vitamins, minerals or herbal supplements.     6. You may drive yourself to and from this procedure.       If you have further questions, please utilize [employer] to contact us.   If your question concerns the above instructions, contact:  Kathleen Ghosh RN   Electrophysiology Nurse Coordinator.  [phone]    If your question concerns the schedule/appointment times, contact:  [facility]   [phone]        Your Care Team:  EP Cardiology   Telephone Number     Nurse Line [phone]     For scheduling appts or procedures:    Arlen Irving   [phone]   For the Device Clinic (Pacemakers, ICDs, Loop Recorders)    During business hours: [phone]  After business hours:   [phone]- select option 4 and ask for job code [id number].     On-call cardiologist for  after hours or on weekends: [phone], option #4, and ask to speak to the on-call cardiologist.     Cardiovascular Clinic:   [address] [facility] [place] [address]      As always, Thank you for trusting us with your health care needs!

## 2021-07-20 DIAGNOSIS — M25.50 ARTHRALGIA, UNSPECIFIED JOINT: Primary | ICD-10-CM

## 2021-07-20 DIAGNOSIS — R76.8 ANA POSITIVE: ICD-10-CM

## 2021-07-20 DIAGNOSIS — H04.129 DRY EYE: ICD-10-CM

## 2021-07-20 DIAGNOSIS — Z11.59 ENCOUNTER FOR SCREENING FOR OTHER VIRAL DISEASES: ICD-10-CM

## 2021-07-20 LAB
ATRIAL RATE - MUSE: 78 BPM
DIASTOLIC BLOOD PRESSURE - MUSE: NORMAL MMHG
INTERPRETATION ECG - MUSE: NORMAL
P AXIS - MUSE: 60 DEGREES
PR INTERVAL - MUSE: 132 MS
QRS DURATION - MUSE: 76 MS
QT - MUSE: 376 MS
QTC - MUSE: 428 MS
R AXIS - MUSE: 66 DEGREES
SYSTOLIC BLOOD PRESSURE - MUSE: NORMAL MMHG
T AXIS - MUSE: 30 DEGREES
VENTRICULAR RATE- MUSE: 78 BPM

## 2021-07-28 ENCOUNTER — MYC MEDICAL ADVICE (OUTPATIENT)
Dept: CARDIOLOGY | Facility: CLINIC | Age: 46
End: 2021-07-28

## 2021-07-28 ENCOUNTER — TRANSFERRED RECORDS (OUTPATIENT)
Dept: HEALTH INFORMATION MANAGEMENT | Facility: CLINIC | Age: 46
End: 2021-07-28

## 2021-07-28 DIAGNOSIS — Z86.718 HISTORY OF DVT (DEEP VEIN THROMBOSIS): ICD-10-CM

## 2021-07-28 DIAGNOSIS — I89.0 LYMPHEDEMA OF ARM: ICD-10-CM

## 2021-07-28 DIAGNOSIS — I87.1 VENOUS THORACIC OUTLET SYNDROME OF RIGHT SUBCLAVIAN VEIN: Primary | ICD-10-CM

## 2021-08-17 ENCOUNTER — LAB (OUTPATIENT)
Dept: LAB | Facility: CLINIC | Age: 46
End: 2021-08-17
Attending: INTERNAL MEDICINE
Payer: COMMERCIAL

## 2021-08-17 DIAGNOSIS — Z11.59 ENCOUNTER FOR SCREENING FOR OTHER VIRAL DISEASES: ICD-10-CM

## 2021-08-17 PROCEDURE — U0003 INFECTIOUS AGENT DETECTION BY NUCLEIC ACID (DNA OR RNA); SEVERE ACUTE RESPIRATORY SYNDROME CORONAVIRUS 2 (SARS-COV-2) (CORONAVIRUS DISEASE [COVID-19]), AMPLIFIED PROBE TECHNIQUE, MAKING USE OF HIGH THROUGHPUT TECHNOLOGIES AS DESCRIBED BY CMS-2020-01-R: HCPCS

## 2021-08-17 PROCEDURE — U0005 INFEC AGEN DETEC AMPLI PROBE: HCPCS

## 2021-08-19 ENCOUNTER — TELEPHONE (OUTPATIENT)
Dept: CARDIOLOGY | Facility: CLINIC | Age: 46
End: 2021-08-19

## 2021-08-19 LAB — SARS-COV-2 RNA RESP QL NAA+PROBE: NEGATIVE

## 2021-08-20 ENCOUNTER — HOSPITAL ENCOUNTER (OUTPATIENT)
Facility: CLINIC | Age: 46
Discharge: HOME | End: 2021-08-20
Attending: INTERNAL MEDICINE | Admitting: INTERNAL MEDICINE
Payer: COMMERCIAL

## 2021-08-20 ENCOUNTER — APPOINTMENT (OUTPATIENT)
Dept: MEDSURG UNIT | Facility: CLINIC | Age: 46
End: 2021-08-20
Attending: INTERNAL MEDICINE
Payer: COMMERCIAL

## 2021-08-20 VITALS
WEIGHT: 190 LBS | DIASTOLIC BLOOD PRESSURE: 67 MMHG | HEART RATE: 72 BPM | OXYGEN SATURATION: 98 % | SYSTOLIC BLOOD PRESSURE: 115 MMHG | TEMPERATURE: 98.1 F | BODY MASS INDEX: 37.3 KG/M2 | HEIGHT: 60 IN | RESPIRATION RATE: 16 BRPM

## 2021-08-20 DIAGNOSIS — G90.A POTS (POSTURAL ORTHOSTATIC TACHYCARDIA SYNDROME): ICD-10-CM

## 2021-08-20 PROCEDURE — 36415 COLL VENOUS BLD VENIPUNCTURE: CPT | Performed by: INTERNAL MEDICINE

## 2021-08-20 PROCEDURE — 258N000003 HC RX IP 258 OP 636: Performed by: INTERNAL MEDICINE

## 2021-08-20 PROCEDURE — 83835 ASSAY OF METANEPHRINES: CPT | Performed by: INTERNAL MEDICINE

## 2021-08-20 PROCEDURE — 999N000132 HC STATISTIC PP CARE STAGE 1

## 2021-08-20 PROCEDURE — 83088 ASSAY OF HISTAMINE: CPT | Performed by: INTERNAL MEDICINE

## 2021-08-20 PROCEDURE — 93660 TILT TABLE EVALUATION: CPT | Performed by: INTERNAL MEDICINE

## 2021-08-20 PROCEDURE — 95921 AUTONOMIC NRV PARASYM INERVJ: CPT | Performed by: INTERNAL MEDICINE

## 2021-08-20 PROCEDURE — 272N000001 HC OR GENERAL SUPPLY STERILE: Performed by: INTERNAL MEDICINE

## 2021-08-20 RX ORDER — SODIUM CHLORIDE 9 MG/ML
INJECTION, SOLUTION INTRAVENOUS CONTINUOUS
Status: DISCONTINUED | OUTPATIENT
Start: 2021-08-20 | End: 2021-08-20 | Stop reason: HOSPADM

## 2021-08-20 RX ADMIN — SODIUM CHLORIDE 550 ML: 9 INJECTION, SOLUTION INTRAVENOUS at 08:34

## 2021-08-20 RX ADMIN — SODIUM CHLORIDE: 9 INJECTION, SOLUTION INTRAVENOUS at 08:35

## 2021-08-20 ASSESSMENT — MIFFLIN-ST. JEOR: SCORE: 1428.33

## 2021-08-20 NOTE — DISCHARGE INSTRUCTIONS
[facility]  DISCHARGE INSTRUCTIONS FOR   TILT TABLE       Date: 8/20/21    Plan:  - Increase hydration with goal to take in 64 oz of water/electrolyte fluids per day.  Recommend drinking 8-10 oz.    - cool showers at the end of the day  - Liberalize salt intake.  - Change positions carefully and slowly and maintain safe environment.      Cardiovascular Clinic:  [address] [place], [address]    Your Care Team:         Cardiology      Telephone Number         Dr. Cristian Crowder                [phone]         Shawanda Ghosh, TRENTON Babcock RN              [phone]         For Scheduling Appointments or              Procedures:      Arlen Rdz               [phone]       As always, Thank you for trusting us with your health care needs!

## 2021-08-20 NOTE — PROGRESS NOTES
Pt arrives to 2A in room nine Pt arrives with . Pt VSS, AOx4. Awaiting consent, prep otherwise complete. Will continue to assess pt until taken for procedure.

## 2021-08-20 NOTE — PROGRESS NOTES
discharge criteria met. discharge instructions reviewed and signed. Labs pending. PIV removed. Walked front door.

## 2021-08-23 LAB — HISTAMINE BLD-SCNC: 2356 NMOL/L

## 2021-08-23 NOTE — PROGRESS NOTES
Damaris is a 46 year old who is being evaluated via a billable video visit.      Video visit - please text invite to: [phone] (H)    How would you like to obtain your AVS? MyChart  If the video visit is dropped, the invitation should be resent by: Text to cell phone: [phone] (H)  Will anyone else be joining your video visit? No      Video Start Time: 11:46 AM           Active Problem List:     Patient Active Problem List    Diagnosis Date Noted     Morbid obesity (H) 09/03/2021     Priority: Medium     Spondylolisthesis at L4-L5 level 09/03/2021     Priority: Medium     Irritable bowel syndrome with diarrhea 09/03/2021     Priority: Medium     POTS (postural orthostatic tachycardia syndrome) 07/19/2021     Priority: Medium     Added automatically from request for surgery [id number]       Venous thoracic outlet syndrome of right subclavian vein 07/26/2019     Priority: Medium     History of DVT (deep vein thrombosis) 07/26/2019     Priority: Medium     Cystic acne 07/26/2019     Priority: Medium     Menorrhagia with regular cycle 07/26/2019     Priority: Medium     PCOS (polycystic ovarian syndrome) 07/26/2019     Priority: Medium     Antiphospholipid antibody syndrome (H) 08/26/2016     Priority: Medium     GERD (gastroesophageal reflux disease) 10/04/2012     Priority: Medium     CARDIOVASCULAR SCREENING; LDL GOAL LESS THAN 160 10/31/2010     Priority: Medium            History of Present Illness:   Damaris Sarmiento is a 46 year old female with PMH of morbid obesity, PCOS, history of DVT, IBS, POTS, + IgM cardiolipin, GERD evaluated via a billable virtual visit in consultation at request of Dr Mendoza for evaluation of joint pains.     She reports chronic pain in her joints mainly lower back, hip, shoulder, neck.  Other area which is bothersome is her feet which are worse in the morning.  Her ankles feel very stiff in the morning and lock up.  She takes Tylenol 2 tablets twice a day which helps some.  In the  morning her fingers are also puffy.  She follows up with orthopedic at [facility] for lower back and hip pain.    She also has chronic dry mouth and dry eyes.  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    She gets skin lesions and has been evaluated by dermatology.  Skin biopsy did not show any evidence of cutaneous lupus.  She has known allergies and  gets on and off sinusitis and uses steroids.  She also see her PCP for POTS disease.  She takes sodium, potassium pills which helps.  In the morning her heart rate is fast and she get dizzy.     She gets pain under her left breast area. No SOB. Denies any malar rash, recurrent mouth/genital ulcers, pleuritic chest pains, recurrent sinusitis/rhinitis, swallowing difficulty, hearing or visual changes recently. She has noticed bluish purple discoloration of her fingertips and has sensitivity to sun.     She has [facility] Dr[facility]erin [facility]  Her first visit was in 12/2018 when she presented with generalized pain, worse in lower back, legs, feet associated with sed rate of 48, CRP 0.7.  In addition she had IBS symptoms, chronic sinus congestion, fatigue.  Autoimmune work-up was unremarkable except for elevated markers of inflammation, low vitamin D level.  No diagnosis of autoimmune connective tissue disease was made at that time. Symptoms were considered related to fibromyalgia, possible early osteoarthritis and PCOS related musculoskeletal symptoms.  Her last visit with Dr. Guerin was in 5/2020, no palpable synovitis was noted in her joints.  She had 12/18 typical fibromyalgia tender points.  She was recommended to follow-up with PCP for fibromyalgia, PCOS and POTS symptoms.    She also has history of prior DVT of the right subclavian vein in 1999 and 2000 with thoracic outlet syndrome status post resection of first right rib.  She received anticoagulation which was  discontinued in 2000.  She has seen Dr. Brian Mao at [place] [facility].  Her anticoagulation work-up showed normal von Willebrand profile, positive IgM cardiolipin antibody of 39, negative beta-2 glycoprotein and lupus anticoagulant.  She was not recommended to take lifelong anticoagulation since her right subclavian vein thrombosis was considered related to thoracic outlet syndrome and was treated surgically by first rib resection.           Review of Systems:     Review Of Systems  Constitutional: denies fever, chills, night sweats and weight loss.  Skin: + skin rash.  Eyes: + dryness or irritation in eyes. No episode of eye inflammation or redness.   Ears/Nose/Throat: no recurrent sinus infections.  Respiratory: No shortness of breath, dyspnea on exertion, cough, or hemoptysis  Cardiovascular: no chest pain or palpitations.  Gastrointestinal: no nausea, vomiting, abdominal pain.  Normal bowel movements.  Genitourinary: no dysuria, frequency  or hematuria.  Musculoskeletal: as in HPI  Neurologic: no numbness, tingling.  Psychiatric: no mood disorders.  Hematologic/Lymphatic/Immunologic: no history of easy bruising, petechia or purpura.  No abnormal bleeding.   Endocrine: no h/o thyroid disease or Diabetes.                  Past Medical History:     Past Medical History:   Diagnosis Date     Asthma      Discoid lupus      Diverticulosis of large intestine 06/2021    FOUND SCREENING SCOPE, TRANSVERSE/DESCENDING     GERD (gastroesophageal reflux disease) 10/04/2012     Polycystic ovarian syndrome      POTS (postural orthostatic tachycardia syndrome) 08/2021     RW ALLERGIES/IMMUNOLOGY (ABSTRACTED)      Sinusitis      Subclavian vein thrombosis, right (H) 2000     Past Surgical History:   Procedure Laterality Date     ENDOSCOPIC SINUS SURGERY  7/16/2014    Procedure: ENDOSCOPIC SINUS SURGERY;  Surgeon: Suzi Vieyra MD;  Location: [facility] STUDY TILT TABLE N/A 8/20/2021    Procedure: EP TILT TABLE;   Surgeon: Ilya Gilmore MD;  Location:  [facility] CATH LAB     ORTHOPEDIC SURGERY       TURBINECTOMY  7/16/2014    Procedure: TURBINECTOMY;  Surgeon: Suzi Vieyra MD;  Location: [facility] NONSPECIFIC PROCEDURE  12/2000    resection of first rib     [facility] NONSPECIFIC PROCEDURE  1992    left foot surgery            Social History:     Social History     Occupational History     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     Employer: [employer]   Tobacco Use     Smoking status: Never Smoker     Smokeless tobacco: Never Used   Substance and Sexual Activity     Alcohol use: Yes     Alcohol/week: 0.0 standard drinks     Comment: rare     Drug use: No     Sexual activity: Yes     Partners: Male     Birth control/protection: Male Surgical     Comment: vasectomy            Family History:     Family History   Problem Relation Age of Onset     Osteoporosis Mother      Musculoskeletal Disorder Father         Paget's disease      Hyperlipidemia Father      Allergies Sister      Other - See Comments Sister         endometriosis     Cerebrovascular Disease Sister 47        stroke after recent ocp start for perimenopausal sx     Cancer Maternal Grandmother         lung     EYE* Maternal Grandfather         glaucoma     Cancer - colorectal Paternal Grandfather      Prostate Cancer Paternal Grandfather      Musculoskeletal Disorder Paternal Uncle         Paget's disease     Breast Cancer Paternal Cousin 55        Second cousin            Allergies:     Allergies   Allergen Reactions     Cat Hair Extract Hives     Dogs Hives     wheezing     Dust Mites      Other reaction(s): Wheezing  eye mucous     Erythromycin      Gluten Meal      Other reaction(s): GI intolerance     Grass Hives     eye mucous     Pollen Extract      Other reaction(s): Other (see comments)  Tree pollen-hives, eye mucous     Ragweeds Hives     eye mucous     Trees      Other reaction(s): Other  (see comments)  Sinus issues     Wheat Bran Hives     Other reaction(s): GI intolerance            Medications:     Current Outpatient Medications   Medication Sig Dispense Refill     acetaminophen (TYLENOL 8 HOUR ARTHRITIS PAIN) 650 MG CR tablet        BABY ASPIRIN PO        budesonide (RINOCORT AQUA) 32 MCG/ACT nasal spray Spray 1 spray into both nostrils daily       CLARITIN 10 MG OR TABS 1 TABLET DAILY       fluocinonide (LIDEX) 0.05 % external ointment        ketorolac tromethamine (ACULAR-LS) 0.4 % SOLN ophthalmic solution        ketotifen (ZADITOR/REFRESH ANTI-ITCH) 0.025 % SOLN ophthalmic solution 1 drop       Lidocaine (LIDOCARE) 4 % Patch        methylcellulose (CITRUCEL) 500 MG TABS tablet Take 100 mg by mouth daily       mupirocin (BACTROBAN) 2 % ointment PIPER A SMALL AMOUNT TO NOSTRILS ONCE OR BID  0     polyethylene glycol (MIRALAX) powder Take 1 capful by mouth daily       Pseudoephedrine HCl (SUDAFED PO)        SODIUM CHLORIDE-SODIUM BICARB NA        tacrolimus (PROTOPIC) 0.1 % external ointment        UNABLE TO FIND MEDICATION NAME: Salt Stick Vitassium       vitamin B-12 (CYANOCOBALAMIN) 1000 MCG tablet        vitamin D3 (CHOLECALCIFEROL) 2000 units (50 mcg) tablet Take 1 tablet by mouth              Physical Exam:   Vitals not taken.   Wt Readings from Last 4 Encounters:   09/03/21 95.7 kg (211 lb)   08/20/21 86.2 kg (190 lb)   07/19/21 94.8 kg (209 lb)   08/19/20 81.6 kg (180 lb)       Constitutional: Obese, appearing stated age; cooperative  MS: No synovitis or tenderness present over MCP, PIP, DIP joints, bilateral wrists, elbows.  Normal range of motion of shoulders.  No knee effusions.  No tenderness present over bilateral ankles, MTP joint.  She has multiple myofascial tender points, interscapular areas, knees, upper thighs etc.  Psych: nl judgement, orientation, memory, affect.         Data:     No results found for any visits on 09/07/21.    Recent Labs   Lab Test 09/03/21  1204  08/19/20  1141 07/26/19  1540 02/06/18  2034   WBC 9.8 9.6  --  12.7*   RBC 4.11 4.08  --  4.31   HGB 12.0 12.0  --  12.7   HCT 36.9 36.5  --  36.8   MCV 90 90  --  85   RDW 13.3 13.0  --  13.3    313  --  330   ALBUMIN 3.5 3.5 3.6 3.6   CRP  --  9.1*  --   --    BUN 8 10 6* 10      Recent Labs   Lab Test 09/03/21  1204 08/19/20  1141 09/20/17  1111   TSH 1.20 1.32 1.16   T4  --   --  2.66*     Hemoglobin   Date Value Ref Range Status   09/03/2021 12.0 11.7 - 15.7 g/dL Final   08/19/2020 12.0 11.7 - 15.7 g/dL Final   02/06/2018 12.7 11.7 - 15.7 g/dL Final   08/26/2016 12.7 11.7 - 15.7 g/dL Final     Urea Nitrogen   Date Value Ref Range Status   09/03/2021 8 7 - 30 mg/dL Final   08/19/2020 10 7 - 30 mg/dL Final   07/26/2019 6 (L) 7 - 30 mg/dL Final   02/06/2018 10 7 - 30 mg/dL Final     Sed Rate   Date Value Ref Range Status   08/19/2020 38 (H) 0 - 20 mm/h Final     CRP Inflammation   Date Value Ref Range Status   08/19/2020 9.1 (H) 0.0 - 8.0 mg/L Final     AST   Date Value Ref Range Status   09/03/2021 15 0 - 45 U/L Final   08/19/2020 11 0 - 45 U/L Final   07/26/2019 17 0 - 45 U/L Final   02/06/2018 15 0 - 45 U/L Final     Albumin   Date Value Ref Range Status   09/03/2021 3.5 3.4 - 5.0 g/dL Final   08/19/2020 3.5 3.4 - 5.0 g/dL Final   07/26/2019 3.6 3.4 - 5.0 g/dL Final   02/06/2018 3.6 3.4 - 5.0 g/dL Final     Alkaline Phosphatase   Date Value Ref Range Status   09/03/2021 58 40 - 150 U/L Final   08/19/2020 51 40 - 150 U/L Final   07/26/2019 50 40 - 150 U/L Final   02/06/2018 54 40 - 150 U/L Final     ALT   Date Value Ref Range Status   09/03/2021 28 0 - 50 U/L Final   08/19/2020 24 0 - 50 U/L Final   07/26/2019 31 0 - 50 U/L Final   02/06/2018 26 0 - 50 U/L Final     Recent Labs   Lab Test 09/03/21  1204 08/19/20  1141 07/26/19  1540 02/06/18  2034 09/20/17  1111 08/26/16  0941   WBC 9.8 9.6  --  12.7*  --   --    HGB 12.0 12.0  --  12.7  --   --    HCT 36.9 36.5  --  36.8  --   --    MCV 90 90  --  85   --   --     313  --  330  --   --    BUN 8 10 6* 10  --   --    TSH 1.20 1.32  --   --  1.16  --    AST 15 11 17 15  --   --    ALT 28 24 31 26  --    < >   ALKPHOS 58 51 50 54  --    < >    < > = values in this interval not displayed.       Outside studies reviewed: Records from [facility] and [place] [facility] reviewed    Reviewed Rheumatology lab flowsheet    Assessment     Polyarthralgia  Myofascial pain-fibromyalgia  History of right subclavian vein DVT-1999  Thoracic outlet syndrome status post right first rib resection  POTS, PCOS  Sicca symptoms    Chronic pain: She reports pain in her joints for the last 10 years.  Mostly lower back, hips, neck and shoulders.  She has seen [facility] orthopedics for lumbar degenerative disc disease, trochanteric bursitis.  Her previous rheumatology evaluations have not revealed any evidence of inflammatory arthritis on exam.  She has myofascial tender points consistent with fibromyalgia.  To complete work-up I will repeat rheumatoid serologies, inflammatory markers.  Will get ELOISA and specific serologies. For fibromyalgia she was instructed to try relaxing techniques like Yoga, Miles chi. She can try Aerobic conditioning. Low-impact aerobic activities such as fast walking, biking, swimming, or water aerobics are most successful among the interventions that have been studied.    Right subclavian vein DVT: She has history of right subclavian vein DVT in 1999 due to thoracic outlet syndrome.  She underwent right first rib resection.  She was treated with anticoagulation for 6 months and was taken off in 2000.  she has seen hematologist at [place] [facility] who did not recommend lifelong anticoagulation.  She had low positive IgM cardiolipin antibody-39, negative beta-2 glycoproteins.     Lumbar degenerative disc disease, trochanteric bursitis: Follows with orthopedic.  Physical therapy for low back and hip pain will be helpful.     Plan      Blood  tests ordered - ESR, CRP, anti-CCP, ELOISA, KARSTEN panel, C3/C4, dsDNA    Follow up in clinic October 14th, 10:00 AM       Video-Visit Details    Type of service:  Video Visit    Video End Time:12:15 pm    Originating Location (pt. Location): Home    Distant Location (provider location):  [facility]     Platform used for Video Visit: [email or web address]

## 2021-08-24 LAB
ANNOTATION COMMENT IMP: NORMAL
METANEPHS SERPL-SCNC: 0.11 NMOL/L
NORMETANEPHRINE SERPL-SCNC: 0.62 NMOL/L

## 2021-08-25 ENCOUNTER — TELEPHONE (OUTPATIENT)
Dept: RHEUMATOLOGY | Facility: CLINIC | Age: 46
End: 2021-08-25

## 2021-08-25 NOTE — TELEPHONE ENCOUNTER
Called and LVM for Emiliana to call back to us if we need to call imaging to have images pushed to us.      TANNER Sanchez [facility]

## 2021-08-25 NOTE — TELEPHONE ENCOUNTER
[facility] Call Center    Phone Message    May a detailed message be left on voicemail: yes     Reason for Call: Other: Pt signed the release for information at [facility] for Dr Tejada and Dr Haley but the imaging might not be available until after appointment. [facility] suggested that Dr Arias's office puts in a request to rush the imaging digitally. Emiliana is the admin for the office her number is [phone].     Action Taken: Message routed to:  Adult Clinics: Rheumatology p [id number]    Travel Screening: Not Applicable

## 2021-09-01 NOTE — PROGRESS NOTES
Damaris is a 46 year old  female who presents for annual exam.     Besides routine health maintenance, she has no other health concerns today.    HPI:  The patient's PCP is Dr. Abby Mendoza.      Since patient was last here we were able to refer her to the [facility] to POTS specialist and she did qualify for the diagnosis. Now taking salt tabs and increasing the dose above what she had already started herself on after reading about it and does feel better. Worst sx are first thing in AM when is dizzy and nauseous and weak and felt to be worsened by dehydration. Is feeling a bit better. F/u with pots clinic planned in November.    Seeing [facility] for her now spondylolisthesis and mild radiculopathy at L4-5. Still concern that her vague autoimmune and inflammatory tests taht never fully matched a dx could actually be ankylosing spondylitis. Has an appointment with a new rheum at the same clinic she was at before but new doc to further eval this. Her esr/crp have always been elevated. Has borderline KIM, etc but no specific dx every made. Some concern her sis had Ank Spon but when she had covid and a stroke she had so many imaging tests for that that actually don't feel she has it but both of them do have some undiagnosed autoimmune thing  She herself got covid vaxx but then def had some flaring of her body/joint aches but now better and still doesn't regret it.    Her GI issues continue with mostly diarrhea and pain and bloating and urgency. Is working on seeing them now that the POTS is at least sorted out    Fasting for labs today. Had her mammo.  with a vas    pcos her whole life and had irregular cycles. Now the last couple years is very regular and monthly. Used to be 3 days and now 5. Day 2-3 are heavier. Using thinx underwear now and usually can wear one pair most of the day. Occasionally changes them once mid day. Never clot, never impacting life or work or activities. No V.M that seem hormonal but  rather her pots causing temp sensitivity    When covid shot flared her pain and hot with summer and her baseline heat intolerance she got side tracked from exercise and has gained a lot of weight. This is very typical for her to yo you a lot. Just got a rower so is planning to recommit to doing that so temp and back pain don't derail her      GYNECOLOGIC HISTORY:    Patient's last menstrual period was 2021.    Regular menses? yes  Menses every 27-31 days.  Length of menses: 4-5 days    Her current contraception method is: vasectomy.  She  reports that she has never smoked. She has never used smokeless tobacco.    Patient is sexually active.  STD testing offered?  Declined     Last PHQ-9 score on record =   PHQ-9 SCORE 9/3/2021   PHQ-9 Total Score 1     Last GAD7 score on record =   KAL-7 SCORE 9/3/2021   Total Score 0     Alcohol Score = 1    HEALTH MAINTENANCE:  Cholesterol:   Recent Labs   Lab Test 20  1141 19  1540 12/10/14  1033 13  0949   CHOL 200* 245* 197 178   HDL 71 71 62 51   * 151* 122 114   TRIG 109 130 63 64   CHOLHDLRATIO  --   --  3.2 3.5     TSH   Date Value Ref Range Status   2020 1.32 0.40 - 4.00 mU/L Final     Last Mammo: 20, Result: Normal, Next Mammo: Today   Pap:   Lab Results   Component Value Date    PAP NIL NEG-HPV 2020    PAP NIL 2015    PAP NIL 2009      Colonoscopy: 21, Result: Normal, Next Colonoscopy:   Dexa:  N/A    Health maintenance updated:  yes    HISTORY:  OB History    Para Term  AB Living   0 0 0 0 0 0   SAB TAB Ectopic Multiple Live Births   0 0 0 0 0       Patient Active Problem List   Diagnosis     CARDIOVASCULAR SCREENING; LDL GOAL LESS THAN 160     GERD (gastroesophageal reflux disease)     Antiphospholipid antibody syndrome (H)     Venous thoracic outlet syndrome of right subclavian vein     History of DVT (deep vein thrombosis)     Cystic acne     Menorrhagia with regular cycle     PCOS  (polycystic ovarian syndrome)     POTS (postural orthostatic tachycardia syndrome)     Morbid obesity (H)     Spondylolisthesis at L4-L5 level     Irritable bowel syndrome with diarrhea     Past Surgical History:   Procedure Laterality Date     ENDOSCOPIC SINUS SURGERY  7/16/2014    Procedure: ENDOSCOPIC SINUS SURGERY;  Surgeon: Suzi Vieyra MD;  Location: [facility]     EP STUDY TILT TABLE N/A 8/20/2021    Procedure: EP TILT TABLE;  Surgeon: Ilya Gilmore MD;  Location:  [facility]     ORTHOPEDIC SURGERY       TURBINECTOMY  7/16/2014    Procedure: TURBINECTOMY;  Surgeon: Suzi Vieyra MD;  Location: [facility]     ZZC NONSPECIFIC PROCEDURE  12/2000    resection of first rib     ZZC NONSPECIFIC PROCEDURE  1992    left foot surgery      Social History     Tobacco Use     Smoking status: Never Smoker     Smokeless tobacco: Never Used   Substance Use Topics     Alcohol use: Yes     Alcohol/week: 0.0 standard drinks     Comment: rare      Problem (# of Occurrences) Relation (Name,Age of Onset)    Allergies (1) Sister    Breast Cancer (1) Paternal Cousin (55): Second cousin    Cancer (1) Maternal Grandmother: lung    Cancer - colorectal (1) Paternal Grandfather    Cerebrovascular Disease (1) Sister (47): stroke after recent ocp start for perimenopausal sx    EYE* (1) Maternal Grandfather: glaucoma    Hyperlipidemia (1) Father    Musculoskeletal Disorder (2) Father: Paget's disease , Paternal Uncle: Paget's disease    Osteoporosis (1) Mother    Other - See Comments (1) Sister: endometriosis    Prostate Cancer (1) Paternal Grandfather            Current Outpatient Medications   Medication Sig     acetaminophen (TYLENOL 8 HOUR ARTHRITIS PAIN) 650 MG CR tablet      BABY ASPIRIN PO      budesonide (RINOCORT AQUA) 32 MCG/ACT nasal spray Spray 1 spray into both nostrils daily     CLARITIN 10 MG OR TABS 1 TABLET DAILY     fluocinonide (LIDEX) 0.05 % external ointment      ketorolac tromethamine  "(ACULAR-LS) 0.4 % SOLN ophthalmic solution      ketotifen (ZADITOR/REFRESH ANTI-ITCH) 0.025 % SOLN ophthalmic solution 1 drop     Lidocaine (LIDOCARE) 4 % Patch      methylcellulose (CITRUCEL) 500 MG TABS tablet Take 100 mg by mouth daily     mupirocin (BACTROBAN) 2 % ointment PIPER A SMALL AMOUNT TO NOSTRILS ONCE OR BID     polyethylene glycol (MIRALAX) powder Take 1 capful by mouth daily     Pseudoephedrine HCl (SUDAFED PO)      SODIUM CHLORIDE-SODIUM BICARB NA      tacrolimus (PROTOPIC) 0.1 % external ointment      UNABLE TO FIND MEDICATION NAME: Salt Stick Vitassium     vitamin B-12 (CYANOCOBALAMIN) 1000 MCG tablet      vitamin D3 (CHOLECALCIFEROL) 2000 units (50 mcg) tablet Take 1 tablet by mouth     No current facility-administered medications for this visit.     Allergies   Allergen Reactions     Cat Hair Extract Hives     Dogs Hives     wheezing     Dust Mites      Other reaction(s): Wheezing  eye mucous     Erythromycin      Gluten Meal      Other reaction(s): GI intolerance     Grass Hives     eye mucous     Pollen Extract      Other reaction(s): Other (see comments)  Tree pollen-hives, eye mucous     Ragweeds Hives     eye mucous     Trees      Other reaction(s): Other (see comments)  Sinus issues     Wheat Bran Hives     Other reaction(s): GI intolerance       Past medical, surgical, social and family histories were reviewed and updated in EPIC.    ROS:   12 point review of systems negative other than symptoms noted below or in the HPI.  No urinary frequency or dysuria, bladder or kidney problems, Normal menstrual cycles    EXAM:  /60   Ht 1.6 m (5' 3\")   Wt 95.7 kg (211 lb)   LMP 08/06/2021   BMI 37.38 kg/m     BMI: Body mass index is 37.38 kg/m .    PHYSICAL EXAM:  Constitutional:   Appearance: Well nourished, well developed, alert, in no acute distress  Neck:  Lymph Nodes:  No lymphadenopathy present    Thyroid:  Gland size normal, nontender, no nodules or masses present  on " palpation  Chest:  Respiratory Effort:  Breathing unlabored, CTA bilaterally  Cardiovascular:    Heart: Auscultation:  Regular rate, normal rhythm, no murmurs present  Breasts: Palpation of Breasts and Axillae:  No masses present on palpation, no breast tenderness., Axillary Lymph Nodes:  No lymphadenopathy present. and No nodularity, asymmetry or nipple discharge bilaterally.  Gastrointestinal:   Abdominal Examination:  Abdomen nontender to palpation, tone normal without rigidity or guarding, no masses present, umbilicus without lesions   Liver and Spleen:  No hepatomegaly present, liver nontender to palpation    Hernias:  No hernias present  Lymphatic: Lymph Nodes:  No other lymphadenopathy present  Skin:  General Inspection:  No rashes present, no lesions present, no areas of  discoloration  Neurologic:    Mental Status:  Oriented X3.  Normal strength and tone, sensory exam                grossly normal, mentation intact and speech normal.    Psychiatric:   Mentation appears normal and affect normal/bright.         Pelvic Exam:  External Genitalia:     Normal appearance for age, no discharge present, no tenderness present, no inflammatory lesions present, color normal, TINY SEB CYST UPPER INNER LABIA MINORA ON THE LEFT  Vagina:     Normal vaginal vault without central or paravaginal defects, no discharge present, no inflammatory lesions present, no masses present  Bladder:     Nontender to palpation  Urethra:   Urethral Body:  Urethra palpation normal, urethra structural support normal   Urethral Meatus:  No erythema or lesions present  Cervix:     Appearance healthy, no lesions present, nontender to palpation, no bleeding present  Uterus:     Uterus: firm, normal sized and nontender, anteverted in position.   Adnexa:     No adnexal tenderness present, no adnexal masses present  Perineum:     Perineum within normal limits, no evidence of trauma, no rashes or skin lesions present  Anus:     Anus within normal  limits, no hemorrhoids present  Inguinal Lymph Nodes:     No lymphadenopathy present  Pubic Hair:     Normal pubic hair distribution for age  Genitalia and Groin:     No rashes present, no lesions present, no areas of discoloration, no masses present      COUNSELING:   Reviewed preventive health counseling, as reflected in patient instructions  Special attention given to:        Regular exercise       Healthy diet/nutrition    BMI: Body mass index is 37.38 kg/m .  Weight management plan: Discussed healthy diet and exercise guidelines    ASSESSMENT:  46 year old female with satisfactory annual exam.    ICD-10-CM    1. Encounter for gynecological examination without abnormal finding  Z01.419    2. Class 2 obesity due to excess calories without serious comorbidity with body mass index (BMI) of 37.0 to 37.9 in adult  E66.09     Z68.37    3. POTS (postural orthostatic tachycardia syndrome)  I49.8    4. Spondylolisthesis at L4-L5 level  M43.16    5. Irritable bowel syndrome with diarrhea  K58.0    6. Encounter for lipid screening for cardiovascular disease  Z13.220 Lipid panel reflex to direct LDL Fasting    Z13.6 Lipid panel reflex to direct LDL Fasting   7. Screening for metabolic disorder  Z13.228 Comprehensive metabolic panel     Comprehensive metabolic panel   8. Screening for thyroid disorder  Z13.29 TSH with free T4 reflex     TSH with free T4 reflex   9. Screening for disorder of blood and blood-forming organs  Z13.0 CBC with platelets     CBC with platelets   10. Encounter for vitamin deficiency screening  Z13.21 Vitamin D Deficiency     Vitamin D Deficiency   11. Screening for diabetes mellitus  Z13.1 Hemoglobin A1c     Hemoglobin A1c   12. High serum vitamin B12  R79.89 Vitamin B12       PLAN:  Pap is UTD for 4 more years. If was to get on any immune modulating meds for her rheum condition, would then do q3 yrs rather than q5  mammo today    Colonoscopy done for GI issues and screening in June and normal with  routine screening to be repeated in 10 yrs.   However will continue to work with them on her IBS-D  Defer her rheum mgmt to them and has an upcoming appointment but will do her other routine annual labs today    Discussed her menses and expectations in the next few years as well as v.m sx  If menorrhagia does occur or DUB or other problems will let me know but for now sounds appropriate for age and interval    Abby Mendoza MD

## 2021-09-03 ENCOUNTER — ANCILLARY PROCEDURE (OUTPATIENT)
Dept: MAMMOGRAPHY | Facility: CLINIC | Age: 46
End: 2021-09-03
Payer: COMMERCIAL

## 2021-09-03 ENCOUNTER — OFFICE VISIT (OUTPATIENT)
Dept: OBGYN | Facility: CLINIC | Age: 46
End: 2021-09-03
Payer: COMMERCIAL

## 2021-09-03 ENCOUNTER — HOSPITAL ENCOUNTER (OUTPATIENT)
Dept: OCCUPATIONAL THERAPY | Facility: CLINIC | Age: 46
Setting detail: THERAPIES SERIES
End: 2021-09-03
Attending: INTERNAL MEDICINE
Payer: COMMERCIAL

## 2021-09-03 VITALS
DIASTOLIC BLOOD PRESSURE: 60 MMHG | SYSTOLIC BLOOD PRESSURE: 104 MMHG | BODY MASS INDEX: 37.39 KG/M2 | WEIGHT: 211 LBS | HEIGHT: 63 IN

## 2021-09-03 DIAGNOSIS — Z13.0 SCREENING FOR DISORDER OF BLOOD AND BLOOD-FORMING ORGANS: ICD-10-CM

## 2021-09-03 DIAGNOSIS — Z13.220 ENCOUNTER FOR LIPID SCREENING FOR CARDIOVASCULAR DISEASE: ICD-10-CM

## 2021-09-03 DIAGNOSIS — Z86.718 HISTORY OF DVT (DEEP VEIN THROMBOSIS): ICD-10-CM

## 2021-09-03 DIAGNOSIS — Z01.419 ENCOUNTER FOR GYNECOLOGICAL EXAMINATION WITHOUT ABNORMAL FINDING: Primary | ICD-10-CM

## 2021-09-03 DIAGNOSIS — Z13.29 SCREENING FOR THYROID DISORDER: ICD-10-CM

## 2021-09-03 DIAGNOSIS — Z13.228 SCREENING FOR METABOLIC DISORDER: ICD-10-CM

## 2021-09-03 DIAGNOSIS — K58.0 IRRITABLE BOWEL SYNDROME WITH DIARRHEA: ICD-10-CM

## 2021-09-03 DIAGNOSIS — G90.A POTS (POSTURAL ORTHOSTATIC TACHYCARDIA SYNDROME): ICD-10-CM

## 2021-09-03 DIAGNOSIS — M43.16 SPONDYLOLISTHESIS AT L4-L5 LEVEL: ICD-10-CM

## 2021-09-03 DIAGNOSIS — I89.0 LYMPHEDEMA OF ARM: ICD-10-CM

## 2021-09-03 DIAGNOSIS — Z12.31 VISIT FOR SCREENING MAMMOGRAM: ICD-10-CM

## 2021-09-03 DIAGNOSIS — Z13.1 SCREENING FOR DIABETES MELLITUS: ICD-10-CM

## 2021-09-03 DIAGNOSIS — I87.1 VENOUS THORACIC OUTLET SYNDROME OF RIGHT SUBCLAVIAN VEIN: ICD-10-CM

## 2021-09-03 DIAGNOSIS — E66.812 CLASS 2 OBESITY DUE TO EXCESS CALORIES WITHOUT SERIOUS COMORBIDITY WITH BODY MASS INDEX (BMI) OF 37.0 TO 37.9 IN ADULT: ICD-10-CM

## 2021-09-03 DIAGNOSIS — E66.09 CLASS 2 OBESITY DUE TO EXCESS CALORIES WITHOUT SERIOUS COMORBIDITY WITH BODY MASS INDEX (BMI) OF 37.0 TO 37.9 IN ADULT: ICD-10-CM

## 2021-09-03 DIAGNOSIS — R79.89 HIGH SERUM VITAMIN B12: ICD-10-CM

## 2021-09-03 DIAGNOSIS — Z13.6 ENCOUNTER FOR LIPID SCREENING FOR CARDIOVASCULAR DISEASE: ICD-10-CM

## 2021-09-03 DIAGNOSIS — Z13.21 ENCOUNTER FOR VITAMIN DEFICIENCY SCREENING: ICD-10-CM

## 2021-09-03 DIAGNOSIS — I89.0 LYMPHEDEMA: Primary | ICD-10-CM

## 2021-09-03 LAB
ERYTHROCYTE [DISTWIDTH] IN BLOOD BY AUTOMATED COUNT: 13.3 % (ref 10–15)
HBA1C MFR BLD: 5.4 % (ref 0–5.6)
HCT VFR BLD AUTO: 36.9 % (ref 35–47)
HGB BLD-MCNC: 12 G/DL (ref 11.7–15.7)
MCH RBC QN AUTO: 29.2 PG (ref 26.5–33)
MCHC RBC AUTO-ENTMCNC: 32.5 G/DL (ref 31.5–36.5)
MCV RBC AUTO: 90 FL (ref 78–100)
PLATELET # BLD AUTO: 383 10E3/UL (ref 150–450)
RBC # BLD AUTO: 4.11 10E6/UL (ref 3.8–5.2)
WBC # BLD AUTO: 9.8 10E3/UL (ref 4–11)

## 2021-09-03 PROCEDURE — 84443 ASSAY THYROID STIM HORMONE: CPT | Performed by: OBSTETRICS & GYNECOLOGY

## 2021-09-03 PROCEDURE — 36415 COLL VENOUS BLD VENIPUNCTURE: CPT | Performed by: OBSTETRICS & GYNECOLOGY

## 2021-09-03 PROCEDURE — 97535 SELF CARE MNGMENT TRAINING: CPT | Mod: GO

## 2021-09-03 PROCEDURE — 82306 VITAMIN D 25 HYDROXY: CPT | Performed by: OBSTETRICS & GYNECOLOGY

## 2021-09-03 PROCEDURE — 77067 SCR MAMMO BI INCL CAD: CPT | Mod: TC | Performed by: RADIOLOGY

## 2021-09-03 PROCEDURE — 80061 LIPID PANEL: CPT | Performed by: OBSTETRICS & GYNECOLOGY

## 2021-09-03 PROCEDURE — 85027 COMPLETE CBC AUTOMATED: CPT | Performed by: OBSTETRICS & GYNECOLOGY

## 2021-09-03 PROCEDURE — 99396 PREV VISIT EST AGE 40-64: CPT | Performed by: OBSTETRICS & GYNECOLOGY

## 2021-09-03 PROCEDURE — 82607 VITAMIN B-12: CPT | Performed by: OBSTETRICS & GYNECOLOGY

## 2021-09-03 PROCEDURE — 80053 COMPREHEN METABOLIC PANEL: CPT | Performed by: OBSTETRICS & GYNECOLOGY

## 2021-09-03 PROCEDURE — 83036 HEMOGLOBIN GLYCOSYLATED A1C: CPT | Performed by: OBSTETRICS & GYNECOLOGY

## 2021-09-03 PROCEDURE — 77063 BREAST TOMOSYNTHESIS BI: CPT | Mod: TC | Performed by: RADIOLOGY

## 2021-09-03 PROCEDURE — 97166 OT EVAL MOD COMPLEX 45 MIN: CPT | Mod: GO

## 2021-09-03 PROCEDURE — 97140 MANUAL THERAPY 1/> REGIONS: CPT | Mod: GO

## 2021-09-03 RX ORDER — TACROLIMUS 1 MG/G
OINTMENT TOPICAL
COMMUNITY
Start: 2021-02-19

## 2021-09-03 RX ORDER — FLUOCINONIDE 0.5 MG/G
OINTMENT TOPICAL
COMMUNITY
Start: 2018-10-18

## 2021-09-03 RX ORDER — LIDOCAINE 4 G/G
PATCH TOPICAL
COMMUNITY
Start: 2016-08-15

## 2021-09-03 RX ORDER — KETOROLAC TROMETHAMINE 4 MG/ML
SOLUTION/ DROPS OPHTHALMIC
COMMUNITY
Start: 2018-01-15

## 2021-09-03 ASSESSMENT — ANXIETY QUESTIONNAIRES
3. WORRYING TOO MUCH ABOUT DIFFERENT THINGS: NOT AT ALL
5. BEING SO RESTLESS THAT IT IS HARD TO SIT STILL: NOT AT ALL
1. FEELING NERVOUS, ANXIOUS, OR ON EDGE: NOT AT ALL
2. NOT BEING ABLE TO STOP OR CONTROL WORRYING: NOT AT ALL
GAD7 TOTAL SCORE: 0
7. FEELING AFRAID AS IF SOMETHING AWFUL MIGHT HAPPEN: NOT AT ALL
6. BECOMING EASILY ANNOYED OR IRRITABLE: NOT AT ALL

## 2021-09-03 ASSESSMENT — MIFFLIN-ST. JEOR: SCORE: 1571.22

## 2021-09-03 ASSESSMENT — PATIENT HEALTH QUESTIONNAIRE - PHQ9
5. POOR APPETITE OR OVEREATING: NOT AT ALL
SUM OF ALL RESPONSES TO PHQ QUESTIONS 1-9: 1

## 2021-09-03 NOTE — PROGRESS NOTES
"   09/03/21 1200   Rehab Discipline   Discipline OT   Type of Visit   Type of visit Initial Edema Evaluation   General Information   Start of care 09/03/21   Referring physician Ilya Gilmore MD   Orders Evaluate and treat as indicated   Order date 08/02/21   Medical diagnosis UE lymphedema   Edema onset 08/02/21   Affected body parts RUE;Trunk   Edema etiology comments R subclavian DVT 1999, with subsequent thoracic outlet syndrome, patient ultimately had 1st rib remove to prevent clotting   Pertinent history of current problem (PT: include personal factors and/or comorbidities that impact the POC; OT: include additional occupational profile info) Patient known to this clinic and therapist through prior episode of CDT.  PMH includes asthma, discoid lupus.  Per chart, recent weight gain with rapid onset ~15#; recently diagnosed with POTS and dysautonomia disorder; taking electrolyte supplements.  Patient reports spouse has been providing manual lymphatic drainage daily, and she has been consistently wearing RUE sleeve, but that it has not been replaced in ~ 2 years.  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 No   Have you fallen and had an injury in the past year? No   Abuse Screen (yes response referral indicated)   Feels Unsafe at Home or Work/School no   Feels Threatened by Someone no   Does Anyone Try to Keep You From Having Contact with Others or Doing Things " "Outside Your Home? no   Physical Signs of Abuse Present no   System Outcome Measures   Outcome Measures Lymphedema   Lymphedema Life Impact Scale (score range 0-72). A higher score indicates greater impairment. 28   Subjective Report   Patient report of symptoms limb heaviness, decreased strength, poor fit of clothing   Patient / Family Goals   Patient / family goals statement \"get back on track\"   Pain   Pain comments no pain at rest, chronic back and hip pain; reports numbness and tingling 2/2 spondolythesis when walking >10 minutes, labral tear has healed and less painful   Cognitive Status   Orientation Orientation to person, place and time   Level of consciousness Alert   Follows commands and answers questions 100% of the time   Personal safety and judgement Intact   Memory Intact   Edema Exam / Assessment   Skin condition comments RUE visibly larger than left, hands approximately symmetrical, brief pitting R forearm; bulge of lymphedema R lat trunk sup to scar tissue.   Volume UE   Right UE (mL) 2794 mL   Left UE (mL) 2458 mL   UE volume comparison RUE volume greater than LUE volume  (patient is R-hand dominant)   % difference 12%   Activities of Daily Living   Activities of Daily Living ind/mod I; increased fatigue   Planned Edema Interventions   Planned edema interventions Manual lymph drainage;Gradient compression bandaging;Fit for compression garment;Exercises;Manual therapy;Scar mobilization;Soft tissue mobilization;Myofascial release;Home management program development   Planned edema intervention comments recommend 3x/week/2   Clinical Impression   Criteria for skilled therapeutic intervention met Yes   Therapy diagnosis chronic RUQ lymphedema    Influenced by the following impairments / conditions Stage 2  (does not fully reverse with elevation)   Assessment of Occupational Performance 3-5 Performance Deficits   Identified Performance Deficits pain and fatigue impacting I/ADL, poor fit of clothing, " increased risk of infection and soft tissue fibrosis   Clinical Decision Making (Complexity) Moderate complexity   Treatment Frequency 3x/week   Treatment duration 2 weeks   Patient / family and/or staff in agreement with plan of care Yes   Risks and benefits of therapy have been explained Yes   Goals   Edema Eval Goals 1;2;3   Goal 1   Goal identifier volume   Goal description For decreased risk of infection, skin breakdown/wounds & progressive soft-tissue fibrosis and improved fit of clothing, volume will be reduced by at least 150 mL RUE.   Target date 11/03/21   Goal 2   Goal identifier GCB   Goal description To reduce volume of lymphedema and risk of soft tissue fibrosis, pt will tolerate up to 23hr/day wear gradient compression bandaging (GCB) of RUE   Target date 11/03/21   Goal 3   Goal identifier home program   Goal description For long-term home mgmt chronic lymphedema pt/caregiver independent in home program a. GCB/GCB alternative garment for night wear b. compression garment for day wear c. ex to incr lymph flow/self-MLD.   Target date 11/03/21   Goal 4   Goal identifier precautions   Goal description Pt will independently verbalize the signs/symptoms of lymphedema, precautions and how to obtain a future lymphedema referral if edema persists to preserve skin integrity, prevent infection and preserve functional mobility.      Target date 11/03/21   Total Evaluation Time   OT Tong, Moderate Complexity Minutes ([id number]) 20

## 2021-09-04 LAB
ALBUMIN SERPL-MCNC: 3.5 G/DL (ref 3.4–5)
ALP SERPL-CCNC: 58 U/L (ref 40–150)
ALT SERPL W P-5'-P-CCNC: 28 U/L (ref 0–50)
ANION GAP SERPL CALCULATED.3IONS-SCNC: 8 MMOL/L (ref 3–14)
AST SERPL W P-5'-P-CCNC: 15 U/L (ref 0–45)
BILIRUB SERPL-MCNC: 0.4 MG/DL (ref 0.2–1.3)
BUN SERPL-MCNC: 8 MG/DL (ref 7–30)
CALCIUM SERPL-MCNC: 9.4 MG/DL (ref 8.5–10.1)
CHLORIDE BLD-SCNC: 105 MMOL/L (ref 94–109)
CHOLEST SERPL-MCNC: 233 MG/DL
CO2 SERPL-SCNC: 26 MMOL/L (ref 20–32)
CREAT SERPL-MCNC: 0.68 MG/DL (ref 0.52–1.04)
FASTING STATUS PATIENT QL REPORTED: YES
GFR SERPL CREATININE-BSD FRML MDRD: >90 ML/MIN/1.73M2
GLUCOSE BLD-MCNC: 80 MG/DL (ref 70–99)
HDLC SERPL-MCNC: 70 MG/DL
LDLC SERPL CALC-MCNC: 137 MG/DL
NONHDLC SERPL-MCNC: 163 MG/DL
POTASSIUM BLD-SCNC: 4 MMOL/L (ref 3.4–5.3)
PROT SERPL-MCNC: 7.5 G/DL (ref 6.8–8.8)
SODIUM SERPL-SCNC: 139 MMOL/L (ref 133–144)
TRIGL SERPL-MCNC: 128 MG/DL
TSH SERPL DL<=0.005 MIU/L-ACNC: 1.2 MU/L (ref 0.4–4)

## 2021-09-04 ASSESSMENT — ANXIETY QUESTIONNAIRES: GAD7 TOTAL SCORE: 0

## 2021-09-07 ENCOUNTER — VIRTUAL VISIT (OUTPATIENT)
Dept: RHEUMATOLOGY | Facility: CLINIC | Age: 46
End: 2021-09-07
Payer: COMMERCIAL

## 2021-09-07 DIAGNOSIS — R76.8 ANA POSITIVE: ICD-10-CM

## 2021-09-07 DIAGNOSIS — H04.129 DRY EYE: ICD-10-CM

## 2021-09-07 DIAGNOSIS — M25.50 ARTHRALGIA, UNSPECIFIED JOINT: ICD-10-CM

## 2021-09-07 LAB — DEPRECATED CALCIDIOL+CALCIFEROL SERPL-MC: 52 UG/L (ref 20–75)

## 2021-09-07 PROCEDURE — 99204 OFFICE O/P NEW MOD 45 MIN: CPT | Mod: GT | Performed by: STUDENT IN AN ORGANIZED HEALTH CARE EDUCATION/TRAINING PROGRAM

## 2021-09-09 LAB — VIT B12 SERPL-MCNC: 1242 PG/ML (ref 193–986)

## 2021-09-09 NOTE — RESULT ENCOUNTER NOTE
Damaris,    Your cholesterol is slighly elevated. The LDL, or bad cholesterol, should be under 130 and not 100 as shown (as long as you don't have any other heart disease risk factors like hypertension or diabetes), and your's is 137 which is just slightly above the cut off. You luckily have a good/high HDL or good cholesterol at 70 to help offset that some. We should jsut plan to repeat a fasting cholesterol again next year to keep tabs on it. Anything that you can do to decrease carbs/sweets/processed foods and even a 5-10# weight loss can really help correct the LDL trend.    Your other labs include your:    Metabolic panel which shows your blood sugar, electrolytes, kidney function, and liver enzymes, which are all normal.    Your thyroid is normal.    Your vitamin D level is 52 with an ideal range of 40-50. It will typically decrease throughout the winter so if you're not already taking 2000 international unit(s)/d of over the counter Vitamin D3 you should plan to do that every day to maintain levels.    Your vitamin B12 level is again high this year, and higher than last year. I don't recall what you're doing in terms of supplementing. If it's just a multivitamin every day then there's no way to cut out the b12. If you're doing any extra vitamins or supplements or health drinks/shakes that have extra B vitamins then I would cut that back by half. Either half as much or the same amount but half as often.    Your hgb A1C, which is a test of how your overall blood sugars have been running over a 3 month period of time, is 5.4%    Your complete blood count is normal. This shows that you are not anemic with a normal hemoglobin and that your white blood cell count and platelet count are normal as well.    AJ

## 2021-09-15 ENCOUNTER — TRANSFERRED RECORDS (OUTPATIENT)
Dept: HEALTH INFORMATION MANAGEMENT | Facility: CLINIC | Age: 46
End: 2021-09-15

## 2021-09-17 ENCOUNTER — LAB (OUTPATIENT)
Dept: LAB | Facility: CLINIC | Age: 46
End: 2021-09-17
Payer: COMMERCIAL

## 2021-09-17 DIAGNOSIS — H04.129 DRY EYE: ICD-10-CM

## 2021-09-17 DIAGNOSIS — M25.50 ARTHRALGIA, UNSPECIFIED JOINT: ICD-10-CM

## 2021-09-17 DIAGNOSIS — R76.8 ANA POSITIVE: ICD-10-CM

## 2021-09-17 LAB
CRP SERPL-MCNC: 10.9 MG/L (ref 0–8)
ERYTHROCYTE [SEDIMENTATION RATE] IN BLOOD BY WESTERGREN METHOD: 41 MM/HR (ref 0–20)

## 2021-09-17 PROCEDURE — 86235 NUCLEAR ANTIGEN ANTIBODY: CPT | Mod: 59

## 2021-09-17 PROCEDURE — 86160 COMPLEMENT ANTIGEN: CPT

## 2021-09-17 PROCEDURE — 86200 CCP ANTIBODY: CPT

## 2021-09-17 PROCEDURE — 86140 C-REACTIVE PROTEIN: CPT

## 2021-09-17 PROCEDURE — 86225 DNA ANTIBODY NATIVE: CPT

## 2021-09-17 PROCEDURE — 36415 COLL VENOUS BLD VENIPUNCTURE: CPT

## 2021-09-17 PROCEDURE — 86160 COMPLEMENT ANTIGEN: CPT | Mod: 59

## 2021-09-17 PROCEDURE — 86431 RHEUMATOID FACTOR QUANT: CPT

## 2021-09-17 PROCEDURE — 85652 RBC SED RATE AUTOMATED: CPT

## 2021-09-19 ENCOUNTER — HEALTH MAINTENANCE LETTER (OUTPATIENT)
Age: 46
End: 2021-09-19

## 2021-09-20 LAB
C3 SERPL-MCNC: 170 MG/DL (ref 81–157)
C4 SERPL-MCNC: 31 MG/DL (ref 13–39)
CCP AB SER IA-ACNC: 0.6 U/ML
DSDNA AB SER-ACNC: 0.7 IU/ML
ENA SM IGG SER IA-ACNC: <1.6 U/ML
ENA SM IGG SER IA-ACNC: NEGATIVE
ENA SS-A AB SER IA-ACNC: <0.5 U/ML
ENA SS-A AB SER IA-ACNC: NEGATIVE
ENA SS-B IGG SER IA-ACNC: <0.6 U/ML
ENA SS-B IGG SER IA-ACNC: NEGATIVE
RHEUMATOID FACT SER NEPH-ACNC: <7 IU/ML
U1 SNRNP IGG SER IA-ACNC: <1.1 U/ML
U1 SNRNP IGG SER IA-ACNC: NEGATIVE

## 2021-10-25 ENCOUNTER — HOSPITAL ENCOUNTER (OUTPATIENT)
Dept: OCCUPATIONAL THERAPY | Facility: CLINIC | Age: 46
Setting detail: THERAPIES SERIES
End: 2021-10-25
Attending: INTERNAL MEDICINE
Payer: COMMERCIAL

## 2021-10-25 PROCEDURE — 97140 MANUAL THERAPY 1/> REGIONS: CPT | Mod: GO

## 2021-10-26 ASSESSMENT — ENCOUNTER SYMPTOMS
EYE WATERING: 1
EYE REDNESS: 1
PALPITATIONS: 1
MUSCLE WEAKNESS: 0
DOUBLE VISION: 0
HYPERTENSION: 0
DIARRHEA: 1
SYNCOPE: 0
SLEEP DISTURBANCES DUE TO BREATHING: 0
LEG PAIN: 0
ORTHOPNEA: 0
BLOATING: 1
HEARTBURN: 1
LIGHT-HEADEDNESS: 1
RECTAL PAIN: 1
JOINT SWELLING: 1
BLOOD IN STOOL: 0
JAUNDICE: 0
EXERCISE INTOLERANCE: 1
BOWEL INCONTINENCE: 0
MUSCLE CRAMPS: 0
NECK PAIN: 1
ARTHRALGIAS: 1
EYE IRRITATION: 1
BACK PAIN: 1
VOMITING: 0
CONSTIPATION: 1
MYALGIAS: 1
HYPOTENSION: 0
NAUSEA: 0
ABDOMINAL PAIN: 1
STIFFNESS: 1

## 2021-10-29 ENCOUNTER — MYC MEDICAL ADVICE (OUTPATIENT)
Dept: CARDIOLOGY | Facility: CLINIC | Age: 46
End: 2021-10-29

## 2021-10-31 NOTE — RESULT ENCOUNTER NOTE
Jose Ocampo,    Your autoimmune work-up has shown negative rheumatoid factor, anti-CCP which are blood test for rheumatoid arthritis.  Other tests for lupus, Sjogren's disease, mixed connective tissue diseases are negative.  Your inflammation markers are mildly elevated which has been present for the last few years.  No evidence of joint inflammation was noted on exam. Will reevaluate you in rheumatology clinic if symptoms worsens.     Michelle Arias MD

## 2021-11-01 ENCOUNTER — OFFICE VISIT (OUTPATIENT)
Dept: CARDIOLOGY | Facility: CLINIC | Age: 46
End: 2021-11-01
Attending: INTERNAL MEDICINE
Payer: COMMERCIAL

## 2021-11-01 VITALS
HEART RATE: 88 BPM | WEIGHT: 216.3 LBS | HEIGHT: 63 IN | SYSTOLIC BLOOD PRESSURE: 123 MMHG | BODY MASS INDEX: 38.32 KG/M2 | DIASTOLIC BLOOD PRESSURE: 79 MMHG | OXYGEN SATURATION: 99 %

## 2021-11-01 DIAGNOSIS — G90.A POTS (POSTURAL ORTHOSTATIC TACHYCARDIA SYNDROME): Primary | ICD-10-CM

## 2021-11-01 PROCEDURE — 99213 OFFICE O/P EST LOW 20 MIN: CPT | Mod: GC | Performed by: INTERNAL MEDICINE

## 2021-11-01 PROCEDURE — G0463 HOSPITAL OUTPT CLINIC VISIT: HCPCS

## 2021-11-01 ASSESSMENT — MIFFLIN-ST. JEOR: SCORE: 1595.26

## 2021-11-01 ASSESSMENT — PAIN SCALES - GENERAL: PAINLEVEL: NO PAIN (0)

## 2021-11-01 NOTE — PATIENT INSTRUCTIONS
You were seen in the Electrophysiology Clinic today by: Dr Gilmore    Plan:       Follow up visit:    3 months with JENNIFER Archer      Further Instructions:    [facility]- Thanh Chowdhury, DO      Your Care Team:  EP Cardiology   Telephone Number     Nurse Line  Azra Chan RN  [phone]     For scheduling appts or procedures:    Arlen Irving   [phone]   For the Device Clinic (Pacemakers, ICDs, Loop Recorders)    During business hours: [phone]  After business hours:   [phone]- select option 4 and ask for job code [id number].     On-call cardiologist for after hours or on weekends: [phone], option #4, and ask to speak to the on-call cardiologist.     Cardiovascular Clinic:   [address] [place] [address]. [place] [address]      As always, Thank you for trusting us with your health care needs!

## 2021-11-01 NOTE — LETTER
"11/1/2021      RE: Damaris Sarmiento  [address] [facility] [facility], [place] [address]       Dear Colleague,    Thank you for the opportunity to participate in the care of your patient, Damaris Sarmiento, at the [facility] [place] at [facility]. Please see a copy of my visit note below.          Clinical Cardiac Electrophysiology    Chief Complaint: Postural tachycardia syndrome (POTS)     HPI July 19, 2021:     Patient reports exerperiencing fast heart rates in the mornings or evenings. She says that her heart rate can go up from 60 to 90s while laying down. She adds that is difficult to stand up when she wakes up. She needs to meditate at least 30 min before incorporating into her daily activities otherwise she feels bad. During some of her fast heart rate episodes, she also feels dizzy and lightheaded. And she has been close enough to fall. No syncope or lightheadeness. Her symptoms started 2 years ago. Before that, she was able to have a \"normal life\". She used to go for long bike rides with her . But this is no longer possible due to her symptoms.     She says having sleeping difficulties. She feels puffy. She reports rapid weight gain at least 15 pounds during the previous months. She thinks she has fluid retention. She thinks that COVID-19 vaccine (4/2021) might've triggered all the aforementioned symptoms.     One year ago, she discovered in the social media a POTS group. She felt identified with those patients. Since then she has been trying to increase her water and salt intake (sodium chloride). She doesn't know if she is taking enough sodium chloride pills but she feels that her symptoms have remarkably improved \" game changer\" since she started these pills.      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      No family hx of immunologic conditios. Her sister is experiencing similar symptoms and she " had a recent stroke.      Interval visit on November 11, 2021: Patient reports doing better since she started taking extra sodium chloride pills with her meals.  Overall her palpitations have improved.  Denies syncope, lightheadedness or chest pain.  She continues to have GI symptoms for which she has had extensive work-up with negative findings.  She came accompanied by her  which also reports improvement of her symptoms.      Current Outpatient Medications   Medication Sig Dispense Refill     acetaminophen (TYLENOL 8 HOUR ARTHRITIS PAIN) 650 MG CR tablet        BABY ASPIRIN PO        budesonide (RINOCORT AQUA) 32 MCG/ACT nasal spray Spray 1 spray into both nostrils daily       CLARITIN 10 MG OR TABS 1 TABLET DAILY       fluocinonide (LIDEX) 0.05 % external ointment        ketorolac tromethamine (ACULAR-LS) 0.4 % SOLN ophthalmic solution        ketotifen (ZADITOR/REFRESH ANTI-ITCH) 0.025 % SOLN ophthalmic solution 1 drop       Lidocaine (LIDOCARE) 4 % Patch        methylcellulose (CITRUCEL) 500 MG TABS tablet Take 100 mg by mouth daily       mupirocin (BACTROBAN) 2 % ointment PIPER A SMALL AMOUNT TO NOSTRILS ONCE OR BID  0     polyethylene glycol (MIRALAX) powder Take 1 capful by mouth daily       Pseudoephedrine HCl (SUDAFED PO)        SODIUM CHLORIDE-SODIUM BICARB NA        tacrolimus (PROTOPIC) 0.1 % external ointment        UNABLE TO FIND MEDICATION NAME: Salt Stick Vitassium       vitamin B-12 (CYANOCOBALAMIN) 1000 MCG tablet        vitamin D3 (CHOLECALCIFEROL) 2000 units (50 mcg) tablet Take 1 tablet by mouth         Past Medical History:   Diagnosis Date     Asthma      Discoid lupus      Diverticulosis of large intestine 06/2021    FOUND SCREENING SCOPE, TRANSVERSE/DESCENDING     GERD (gastroesophageal reflux disease) 10/04/2012     Polycystic ovarian syndrome      POTS (postural orthostatic tachycardia syndrome) 08/2021     RW ALLERGIES/IMMUNOLOGY (ABSTRACTED)      Sinusitis      Subclavian vein  thrombosis, right (H) 2000       Past Surgical History:   Procedure Laterality Date     ENDOSCOPIC SINUS SURGERY  7/16/2014    Procedure: ENDOSCOPIC SINUS SURGERY;  Surgeon: Suzi Viyera MD;  Location: [facility]     EP STUDY TILT TABLE N/A 8/20/2021    Procedure: EP TILT TABLE;  Surgeon: Ilya Gilmore MD;  Location:  HEART CARDIAC CATH LAB     ORTHOPEDIC SURGERY       TURBINECTOMY  7/16/2014    Procedure: TURBINECTOMY;  Surgeon: Suzi Vieyra MD;  Location: [facility]     ZZ NONSPECIFIC PROCEDURE  12/2000    resection of first rib     ZZC NONSPECIFIC PROCEDURE  1992    left foot surgery       Family History   Problem Relation Age of Onset     Osteoporosis Mother      Musculoskeletal Disorder Father         Paget's disease      Hyperlipidemia Father      Allergies Sister      Other - See Comments Sister         endometriosis     Cerebrovascular Disease Sister 47        stroke after recent ocp start for perimenopausal sx     Cancer Maternal Grandmother         lung     EYE* Maternal Grandfather         glaucoma     Cancer - colorectal Paternal Grandfather      Prostate Cancer Paternal Grandfather      Musculoskeletal Disorder Paternal Uncle         Paget's disease     Breast Cancer Paternal Cousin 55        Second cousin       Social History     Tobacco Use     Smoking status: Never Smoker     Smokeless tobacco: Never Used   Substance Use Topics     Alcohol use: Yes     Alcohol/week: 0.0 standard drinks     Comment: rare       Allergies   Allergen Reactions     Cat Hair Extract Hives     Dogs Hives     wheezing     Dust Mites      Other reaction(s): Wheezing  eye mucous     Erythromycin      Gluten Meal      Other reaction(s): GI intolerance     Grass Hives     eye mucous     Pollen Extract      Other reaction(s): Other (see comments)  Tree pollen-hives, eye mucous     Ragweeds Hives     eye mucous     Trees      Other reaction(s): Other (see comments)  Sinus issues     Wheat Bran Hives     Other  reaction(s): GI intolerance       Physical examination:  There were no vitals taken for this visit.   Extended Vitals not filed for this encounter.    GENERAL APPEARANCE: healthy, doing well  NECK: no venous distention  RESPIRATORY: lungs clear to auscultation - no rales, rhonchi or wheezes  CARDIOVASCULAR: regular, normal S1 S2, no S3 or S4 and no murmur, click or rub, precordium quiet with normal PMI  ABDOMEN: soft, non tender with normal bowel sounds   EXTREMITIES: no peripheral edema       Laboratory and diagnostic data personally reviewed Novemeber 04, 2021:    Results for SIGIFREDO MATTHEW (MRN [id number]) as of 11/4/2021 07:27   Ref. Range 9/3/2021 12:04   Sodium Latest Ref Range: 133 - 144 mmol/L 139   Potassium Latest Ref Range: 3.4 - 5.3 mmol/L 4.0   Chloride Latest Ref Range: 94 - 109 mmol/L 105   Carbon Dioxide Latest Ref Range: 20 - 32 mmol/L 26   Urea Nitrogen Latest Ref Range: 7 - 30 mg/dL 8   Creatinine Latest Ref Range: 0.52 - 1.04 mg/dL 0.68   GFR Estimate Latest Ref Range: >60 mL/min/1.73m2 >90   Calcium Latest Ref Range: 8.5 - 10.1 mg/dL 9.4   Anion Gap Latest Ref Range: 3 - 14 mmol/L 8   Albumin Latest Ref Range: 3.4 - 5.0 g/dL 3.5   Protein Total Latest Ref Range: 6.8 - 8.8 g/dL 7.5   Bilirubin Total Latest Ref Range: 0.2 - 1.3 mg/dL 0.4   Alkaline Phosphatase Latest Ref Range: 40 - 150 U/L 58   ALT Latest Ref Range: 0 - 50 U/L 28   AST Latest Ref Range: 0 - 45 U/L 15   Cholesterol Latest Ref Range: <200 mg/dL 233 (H)   Patient Fasting > 8hrs? Unknown Yes   HDL Cholesterol Latest Ref Range: >=50 mg/dL 70   Hemoglobin A1C Latest Ref Range: 0.0 - 5.6 % 5.4       EP PROCEDURE NOTE     *Procedures:*     1. TILT table test.  2. Autonomic function test.  3. EEG monitoring     *Cardiologist:*  Dr. Gilmore     *EP Fellow:*  Dr. Hdz     *Referring MD: *  Dr Gilmore     *Pre-operative Diagnosis:*  Lightheaded, ?POTS     *Complications:*  None.      *Medications:*  NTG 0.4mg SL/None.      *Clinical  Profile:*  45 y/o lady seen in clinic for further evaluation of ?POTS. Patient reports fast heart rate while resting associated with lightheadedness. She has some limitations in her daily activities. Based on BP and HR measurements today, she does not meet criteria for postural tachycardia syndrome. Although we can't rule out POTS without tilt table test.    -Recommended to add 1 pill of sodium chloride with her meals.   -Encouraged to use compression garment  -Tilt table test     The patient is here for autonomic testing including tilt testing.       PROCEDURE     The risks and benefits of the procedure were explained to the patient in   full. Written informed consent was obtained. The patient was brought to the   EP lab in a fasting and hemodynamically stable condition. Physical   examination of the patient did not reveal any carotid bruits, and review of   the chart did not reveal the presence of stroke or TIA within the past   three months.      The following maneuvers were done sequentially:          DISCUSSION:  Her tilt table test was unremarkable and she was asymptomatic. Discussed the findings with the patient. Recommended continued increased salt and water intake, as well as compression garments. Labs ordered and drawn which we will follow up on. Return appointment already scheduled for November, 2021.     Dr iGlmore was present throughout the entire procedure.      Staff: I was present and supervised the above. The Valsalva ratio is 1.35 (normal). Normal autonomic testing and tilt.     Assessment and recommendations:    1. Postural lightheadedness   We saw this patient in mid July for evaluation of fast heart rate while resting associated with lightheadedness.  Since then, she underwent tilt table evaluation which revealed no significant findings.  It is possible that her symptoms are mostly related to orthostatic hypotension since patient has moderate GI symptoms of unclear etiology which limit her  daily hydration.  She has been taking 2 pills of sodium chloride with her meals and reports improvement of her symptoms with this.  Overall she says feeling much better.  We encourage her to keep a good hydration as well as using compression garments.      -Follow up with Suzi DALY NP   -Recommended to continue good hydration    Jorge Reyes Castro, MD  Cardiology Fellow, PG4    Attending: Patient seen and examined with Dr. Reyes. The history and physical findings are accurate as recorded. My additional findings, if any, have been incorporated into the body of the note. All labs, imaging studies, ECG and telemetry data have been reviewed personally. The assessment and plan outlined reflect our joint decision making.    Ilya Gilmore MD

## 2021-11-01 NOTE — NURSING NOTE
Chief Complaint   Patient presents with     Follow Up     4 month follow up Post tilt      Vitals were taken and medications were reconciled.   Kumar Dean, EMT  4:01 PM

## 2021-11-01 NOTE — PROGRESS NOTES
"      Clinical Cardiac Electrophysiology    Chief Complaint: Postural tachycardia syndrome (POTS)     HPI July 19, 2021:     Patient reports exerperiencing fast heart rates in the mornings or evenings. She says that her heart rate can go up from 60 to 90s while laying down. She adds that is difficult to stand up when she wakes up. She needs to meditate at least 30 min before incorporating into her daily activities otherwise she feels bad. During some of her fast heart rate episodes, she also feels dizzy and lightheaded. And she has been close enough to fall. No syncope or lightheadeness. Her symptoms started 2 years ago. Before that, she was able to have a \"normal life\". She used to go for long bike rides with her . But this is no longer possible due to her symptoms.     She says having sleeping difficulties. She feels puffy. She reports rapid weight gain at least 15 pounds during the previous months. She thinks she has fluid retention. She thinks that COVID-19 vaccine (4/2021) might've triggered all the aforementioned symptoms.     One year ago, she discovered in the social media a POTS group. She felt identified with those patients. Since then she has been trying to increase her water and salt intake (sodium chloride). She doesn't know if she is taking enough sodium chloride pills but she feels that her symptoms have remarkably improved \" game changer\" since she started these pills.      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      No family hx of immunologic conditios. Her sister is experiencing similar symptoms and she had a recent stroke.      Interval visit on November 11, 2021: Patient reports doing better since she started taking extra sodium chloride pills with her meals.  Overall her palpitations have improved.  Denies syncope, lightheadedness or chest pain.  She continues to have GI symptoms for which she has had extensive work-up with negative " findings.  She came accompanied by her  which also reports improvement of her symptoms.      Current Outpatient Medications   Medication Sig Dispense Refill     acetaminophen (TYLENOL 8 HOUR ARTHRITIS PAIN) 650 MG CR tablet        BABY ASPIRIN PO        budesonide (RINOCORT AQUA) 32 MCG/ACT nasal spray Spray 1 spray into both nostrils daily       CLARITIN 10 MG OR TABS 1 TABLET DAILY       fluocinonide (LIDEX) 0.05 % external ointment        ketorolac tromethamine (ACULAR-LS) 0.4 % SOLN ophthalmic solution        ketotifen (ZADITOR/REFRESH ANTI-ITCH) 0.025 % SOLN ophthalmic solution 1 drop       Lidocaine (LIDOCARE) 4 % Patch        methylcellulose (CITRUCEL) 500 MG TABS tablet Take 100 mg by mouth daily       mupirocin (BACTROBAN) 2 % ointment PIPER A SMALL AMOUNT TO NOSTRILS ONCE OR BID  0     polyethylene glycol (MIRALAX) powder Take 1 capful by mouth daily       Pseudoephedrine HCl (SUDAFED PO)        SODIUM CHLORIDE-SODIUM BICARB NA        tacrolimus (PROTOPIC) 0.1 % external ointment        UNABLE TO FIND MEDICATION NAME: Salt Stick Vitassium       vitamin B-12 (CYANOCOBALAMIN) 1000 MCG tablet        vitamin D3 (CHOLECALCIFEROL) 2000 units (50 mcg) tablet Take 1 tablet by mouth         Past Medical History:   Diagnosis Date     Asthma      Discoid lupus      Diverticulosis of large intestine 06/2021    FOUND SCREENING SCOPE, TRANSVERSE/DESCENDING     GERD (gastroesophageal reflux disease) 10/04/2012     Polycystic ovarian syndrome      POTS (postural orthostatic tachycardia syndrome) 08/2021     RW ALLERGIES/IMMUNOLOGY (ABSTRACTED)      Sinusitis      Subclavian vein thrombosis, right (H) 2000       Past Surgical History:   Procedure Laterality Date     ENDOSCOPIC SINUS SURGERY  7/16/2014    Procedure: ENDOSCOPIC SINUS SURGERY;  Surgeon: Suzi Vieyra MD;  Location: [facility]     EP STUDY TILT TABLE N/A 8/20/2021    Procedure: EP TILT TABLE;  Surgeon: Ilya Gilmore MD;  Location: [facility]  CARDIAC CATH LAB     ORTHOPEDIC SURGERY       TURBINECTOMY  7/16/2014    Procedure: TURBINECTOMY;  Surgeon: Suzi Vieyra MD;  Location: [facility] NONSPECIFIC PROCEDURE  12/2000    resection of first rib     [facility] NONSPECIFIC PROCEDURE  1992    left foot surgery       Family History   Problem Relation Age of Onset     Osteoporosis Mother      Musculoskeletal Disorder Father         Paget's disease      Hyperlipidemia Father      Allergies Sister      Other - See Comments Sister         endometriosis     Cerebrovascular Disease Sister 47        stroke after recent ocp start for perimenopausal sx     Cancer Maternal Grandmother         lung     EYE* Maternal Grandfather         glaucoma     Cancer - colorectal Paternal Grandfather      Prostate Cancer Paternal Grandfather      Musculoskeletal Disorder Paternal Uncle         Paget's disease     Breast Cancer Paternal Cousin 55        Second cousin       Social History     Tobacco Use     Smoking status: Never Smoker     Smokeless tobacco: Never Used   Substance Use Topics     Alcohol use: Yes     Alcohol/week: 0.0 standard drinks     Comment: rare       Allergies   Allergen Reactions     Cat Hair Extract Hives     Dogs Hives     wheezing     Dust Mites      Other reaction(s): Wheezing  eye mucous     Erythromycin      Gluten Meal      Other reaction(s): GI intolerance     Grass Hives     eye mucous     Pollen Extract      Other reaction(s): Other (see comments)  Tree pollen-hives, eye mucous     Ragweeds Hives     eye mucous     Trees      Other reaction(s): Other (see comments)  Sinus issues     Wheat Bran Hives     Other reaction(s): GI intolerance       Physical examination:  There were no vitals taken for this visit.   Extended Vitals not filed for this encounter.    GENERAL APPEARANCE: healthy, doing well  NECK: no venous distention  RESPIRATORY: lungs clear to auscultation - no rales, rhonchi or wheezes  CARDIOVASCULAR: regular, normal S1 S2, no S3 or S4  and no murmur, click or rub, precordium quiet with normal PMI  ABDOMEN: soft, non tender with normal bowel sounds   EXTREMITIES: no peripheral edema       Laboratory and diagnostic data personally reviewed Novemeber 04, 2021:    Results for SIGIFREDO MATTHEW (MRN [id number]) as of 11/4/2021 07:27   Ref. Range 9/3/2021 12:04   Sodium Latest Ref Range: 133 - 144 mmol/L 139   Potassium Latest Ref Range: 3.4 - 5.3 mmol/L 4.0   Chloride Latest Ref Range: 94 - 109 mmol/L 105   Carbon Dioxide Latest Ref Range: 20 - 32 mmol/L 26   Urea Nitrogen Latest Ref Range: 7 - 30 mg/dL 8   Creatinine Latest Ref Range: 0.52 - 1.04 mg/dL 0.68   GFR Estimate Latest Ref Range: >60 mL/min/1.73m2 >90   Calcium Latest Ref Range: 8.5 - 10.1 mg/dL 9.4   Anion Gap Latest Ref Range: 3 - 14 mmol/L 8   Albumin Latest Ref Range: 3.4 - 5.0 g/dL 3.5   Protein Total Latest Ref Range: 6.8 - 8.8 g/dL 7.5   Bilirubin Total Latest Ref Range: 0.2 - 1.3 mg/dL 0.4   Alkaline Phosphatase Latest Ref Range: 40 - 150 U/L 58   ALT Latest Ref Range: 0 - 50 U/L 28   AST Latest Ref Range: 0 - 45 U/L 15   Cholesterol Latest Ref Range: <200 mg/dL 233 (H)   Patient Fasting > 8hrs? Unknown Yes   HDL Cholesterol Latest Ref Range: >=50 mg/dL 70   Hemoglobin A1C Latest Ref Range: 0.0 - 5.6 % 5.4       EP PROCEDURE NOTE     *Procedures:*     1. TILT table test.  2. Autonomic function test.  3. EEG monitoring     *Cardiologist:*  Dr. Gilmore     *EP Fellow:*  Dr. Hdz     *Referring MD: *  Dr Gilmore     *Pre-operative Diagnosis:*  Lightheaded, ?POTS     *Complications:*  None.      *Medications:*  NTG 0.4mg SL/None.      *Clinical Profile:*  45 y/o lady seen in clinic for further evaluation of ?POTS. Patient reports fast heart rate while resting associated with lightheadedness. She has some limitations in her daily activities. Based on BP and HR measurements today, she does not meet criteria for postural tachycardia syndrome. Although we can't rule out POTS without tilt  table test.    -Recommended to add 1 pill of sodium chloride with her meals.   -Encouraged to use compression garment  -Tilt table test     The patient is here for autonomic testing including tilt testing.       PROCEDURE     The risks and benefits of the procedure were explained to the patient in   full. Written informed consent was obtained. The patient was brought to the   EP lab in a fasting and hemodynamically stable condition. Physical   examination of the patient did not reveal any carotid bruits, and review of   the chart did not reveal the presence of stroke or TIA within the past   three months.      The following maneuvers were done sequentially:          DISCUSSION:  Her tilt table test was unremarkable and she was asymptomatic. Discussed the findings with the patient. Recommended continued increased salt and water intake, as well as compression garments. Labs ordered and drawn which we will follow up on. Return appointment already scheduled for November, 2021.     Dr Gilmore was present throughout the entire procedure.      Staff: I was present and supervised the above. The Valsalva ratio is 1.35 (normal). Normal autonomic testing and tilt.     Assessment and recommendations:    1. Postural lightheadedness   We saw this patient in mid July for evaluation of fast heart rate while resting associated with lightheadedness.  Since then, she underwent tilt table evaluation which revealed no significant findings.  It is possible that her symptoms are mostly related to orthostatic hypotension since patient has moderate GI symptoms of unclear etiology which limit her daily hydration.  She has been taking 2 pills of sodium chloride with her meals and reports improvement of her symptoms with this.  Overall she says feeling much better.  We encourage her to keep a good hydration as well as using compression garments.      -Follow up with Suzi DALY NP   -Recommended to continue good hydration    Jorge Reyes Castro,  MD  Cardiology Fellow, PG4    Attending: Patient seen and examined with Dr. Reyes. The history and physical findings are accurate as recorded. My additional findings, if any, have been incorporated into the body of the note. All labs, imaging studies, ECG and telemetry data have been reviewed personally. The assessment and plan outlined reflect our joint decision making.    Ilya Gilmore MD

## 2021-12-13 ENCOUNTER — TELEPHONE (OUTPATIENT)
Dept: RHEUMATOLOGY | Facility: CLINIC | Age: 46
End: 2021-12-13

## 2021-12-13 NOTE — TELEPHONE ENCOUNTER
Scott Arias    This is a peer to peer request for MRI Sacroilliac procedure. The insurance is requesting this to better understand the reason why the test is being ordered. This peer to peer needs to be completed on or before 12/14/2021.     Please call the insurance company and reference the following information:    Payor phone number: [phone]    Case number: [id number]     Patient ID: [id number]      Reason why it was denied: Records show you have a problem with your pelvis. Records do not show results of a prior picture study such as an Ultrasound showing the reason why this requested study is needed.      Requesting response of outcome back to FC's on approval/denial with the following information:   o auth#  o date range  o who they spoke to     If you have any further questions please feel free to reach out to me. Thank you for your attention on this.     Rebecca Gloria  Senior Intake Financial Counselor  [facility]  [address] [address] [place] [id number]  Ph: [phone]  Fax: [phone]

## 2021-12-14 NOTE — TELEPHONE ENCOUNTER
Called patient and notified her that the MRI has been approved. Let her know the PA team would be notified.    SUZIE NobleN, RN   Rheumatology RN Care Coordinator   [facility]   [phone]

## 2021-12-14 NOTE — TELEPHONE ENCOUNTER
Following up on this request as the patient is calling to inquire on the insurance denial. Please advise.

## 2021-12-14 NOTE — TELEPHONE ENCOUNTER
I did Peer to peer review and MRI pelvis is authorized     Authorization number - [id number].  Authorization is valid until January 28.    Let me know if anything else is required.

## 2021-12-30 ENCOUNTER — ANCILLARY PROCEDURE (OUTPATIENT)
Dept: MRI IMAGING | Facility: CLINIC | Age: 46
End: 2021-12-30
Attending: STUDENT IN AN ORGANIZED HEALTH CARE EDUCATION/TRAINING PROGRAM
Payer: COMMERCIAL

## 2021-12-30 DIAGNOSIS — R70.0 ELEVATED SED RATE: ICD-10-CM

## 2021-12-30 DIAGNOSIS — R76.8 ANA POSITIVE: ICD-10-CM

## 2021-12-30 DIAGNOSIS — M25.50 ARTHRALGIA, UNSPECIFIED JOINT: ICD-10-CM

## 2021-12-30 PROCEDURE — 72195 MRI PELVIS W/O DYE: CPT | Performed by: RADIOLOGY

## 2022-01-04 ENCOUNTER — TRANSFERRED RECORDS (OUTPATIENT)
Dept: HEALTH INFORMATION MANAGEMENT | Facility: CLINIC | Age: 47
End: 2022-01-04
Payer: COMMERCIAL

## 2022-01-13 ENCOUNTER — TRANSFERRED RECORDS (OUTPATIENT)
Dept: HEALTH INFORMATION MANAGEMENT | Facility: CLINIC | Age: 47
End: 2022-01-13
Payer: COMMERCIAL

## 2022-02-24 ENCOUNTER — OFFICE VISIT (OUTPATIENT)
Dept: RHEUMATOLOGY | Facility: CLINIC | Age: 47
End: 2022-02-24
Payer: COMMERCIAL

## 2022-02-24 DIAGNOSIS — H20.9 UVEITIS: ICD-10-CM

## 2022-02-24 DIAGNOSIS — L50.8 RECURRENT URTICARIA: Primary | ICD-10-CM

## 2022-02-24 PROCEDURE — 99214 OFFICE O/P EST MOD 30 MIN: CPT | Performed by: STUDENT IN AN ORGANIZED HEALTH CARE EDUCATION/TRAINING PROGRAM

## 2022-02-24 RX ORDER — PREDNISONE 5 MG/1
TABLET ORAL
Qty: 100 TABLET | Refills: 2 | Status: SHIPPED | OUTPATIENT
Start: 2022-02-24 | End: 2022-08-15

## 2022-02-24 NOTE — PROGRESS NOTES
Rheumatology clinic visit note          Active Problem List:     Patient Active Problem List    Diagnosis Date Noted     Morbid obesity (H) 09/03/2021     Priority: Medium     Spondylolisthesis at L4-L5 level 09/03/2021     Priority: Medium     Irritable bowel syndrome with diarrhea 09/03/2021     Priority: Medium     POTS (postural orthostatic tachycardia syndrome) 07/19/2021     Priority: Medium     Added automatically from request for surgery [id number]       Venous thoracic outlet syndrome of right subclavian vein 07/26/2019     Priority: Medium     History of DVT (deep vein thrombosis) 07/26/2019     Priority: Medium     Cystic acne 07/26/2019     Priority: Medium     Menorrhagia with regular cycle 07/26/2019     Priority: Medium     PCOS (polycystic ovarian syndrome) 07/26/2019     Priority: Medium     Antiphospholipid antibody syndrome (H) 08/26/2016     Priority: Medium     GERD (gastroesophageal reflux disease) 10/04/2012     Priority: Medium     CARDIOVASCULAR SCREENING; LDL GOAL LESS THAN 160 10/31/2010     Priority: Medium            History of Present Illness:   Damaris Sarmiento is a 46 year old female with PMH of morbid obesity, PCOS, history of DVT, IBS, POTS, + IgM cardiolipin, GERD evaluated in clinic for follow up evaluation of joint pains.     She reports chronic pain in her joints mainly lower back, hip, shoulder, neck.  Other area which is bothersome is her feet which are worse in the morning.  Her ankles feel very stiff in the morning and lock up.  She takes Tylenol 2 tablets twice a day which helps some.  In the morning her fingers are also puffy.  She follows up with orthopedic at [facility] for lower back and hip pain.    She also has chronic dry mouth and dry eyes.  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    She gets skin lesions and has been evaluated by dermatology.  Skin biopsy did not show any evidence of cutaneous lupus.   She has known allergies and  gets on and off sinusitis and uses steroids.  She also see her PCP for POTS disease.  She takes sodium, potassium pills which helps.  In the morning her heart rate is fast and she get dizzy.     She gets pain under her left breast area. No SOB. Denies any malar rash, recurrent mouth/genital ulcers, pleuritic chest pains, recurrent sinusitis/rhinitis, swallowing difficulty, hearing or visual changes recently. She has noticed bluish purple discoloration of her fingertips and has sensitivity to sun.     She has seen Dr. Guerin [facility].  Her first visit was in 12/2018 when she presented with generalized pain, worse in lower back, legs, feet associated with sed rate of 48, CRP 0.7.  In addition she had IBS symptoms, chronic sinus congestion, fatigue.  Autoimmune work-up was unremarkable except for elevated markers of inflammation, low vitamin D level.  No diagnosis of autoimmune connective tissue disease was made at that time. Symptoms were considered related to fibromyalgia, possible early osteoarthritis and PCOS related musculoskeletal symptoms.  Her last visit with Dr. Guerin was in 5/2020, no palpable synovitis was noted in her joints.  She had 12/18 typical fibromyalgia tender points.  She was recommended to follow-up with PCP for fibromyalgia, PCOS and POTS symptoms.    She also has history of prior DVT of the right subclavian vein in 1999 and 2000 with thoracic outlet syndrome status post resection of first right rib.  She received anticoagulation which was discontinued in 2000.  She has seen Dr. Brian Mao at [place] [facility].  Her anticoagulation work-up showed normal von Willebrand profile, positive IgM cardiolipin antibody of 39, negative beta-2 glycoprotein and lupus anticoagulant.  She was not recommended to take lifelong anticoagulation since her right subclavian vein thrombosis was considered related to thoracic outlet syndrome  and was treated surgically by first rib resection.     February 24, 2022 - She had another MRI of Lumbar spine throught Ortho and had steroid injection 3 - 4 weeks ago. SHe is doing PT as well. If she does not take Tylenol arthritis she can not work. Her hands are swollen, sometimes her feet are crumpled in the morning. She has chronic constipation.  Colonoscopy done in 6/2021 was normal. If she does not take Miralax she gets constipated. She gets hives, facial lesions. In summer she had hives for 3 weeks. Last uveitis flare was 1 st week of January. Facial flushing happen every day. She take Claritin. She has not seen Allergist.  She has pain in her feet on the medial /interior side. She has stiffness in her hands. wrists francis lock up. She has hip pain all the time. She has POTS.  When she eat she feel it gets stuck.            Review of Systems:     Review Of Systems  Constitutional: denies fever, chills, night sweats and weight loss.  Skin: + skin rash.  Eyes: + dryness or irritation in eyes. No episode of eye inflammation or redness.   Ears/Nose/Throat: no recurrent sinus infections.  Respiratory: No shortness of breath, dyspnea on exertion, cough, or hemoptysis  Cardiovascular: no chest pain or palpitations.  Gastrointestinal: no nausea, vomiting, abdominal pain.  Normal bowel movements.  Genitourinary: no dysuria, frequency  or hematuria.  Musculoskeletal: as in HPI  Neurologic: no numbness, tingling.  Psychiatric: no mood disorders.  Hematologic/Lymphatic/Immunologic: no history of easy bruising, petechia or purpura.  No abnormal bleeding.   Endocrine: no h/o thyroid disease or Diabetes.                  Past Medical History:     Past Medical History:   Diagnosis Date     Asthma      Discoid lupus      Diverticulosis of large intestine 06/2021    FOUND SCREENING SCOPE, TRANSVERSE/DESCENDING     GERD (gastroesophageal reflux disease) 10/04/2012     Polycystic ovarian syndrome      POTS (postural orthostatic  tachycardia syndrome) 08/2021      ALLERGIES/IMMUNOLOGY (ABSTRACTED)      Sinusitis      Subclavian vein thrombosis, right (H) 2000     Past Surgical History:   Procedure Laterality Date     ENDOSCOPIC SINUS SURGERY  7/16/2014    Procedure: ENDOSCOPIC SINUS SURGERY;  Surgeon: Suzi Vieyra MD;  Location: [facility]     EP STUDY TILT TABLE N/A 8/20/2021    Procedure: EP TILT TABLE;  Surgeon: Ilya Gilmore MD;  Location:  [facility]     ORTHOPEDIC SURGERY       TURBINECTOMY  7/16/2014    Procedure: TURBINECTOMY;  Surgeon: Suzi Vieyra MD;  Location: [facility]     ZZC NONSPECIFIC PROCEDURE  12/2000    resection of first rib     ZZC NONSPECIFIC PROCEDURE  1992    left foot surgery            Social History:     Social History     Occupational History     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     Employer: [employer]   Tobacco Use     Smoking status: Never Smoker     Smokeless tobacco: Never Used   Substance and Sexual Activity     Alcohol use: Yes     Alcohol/week: 0.0 standard drinks     Comment: rare     Drug use: No     Sexual activity: Yes     Partners: Male     Birth control/protection: Male Surgical     Comment: vasectomy            Family History:     Family History   Problem Relation Age of Onset     Osteoporosis Mother      Musculoskeletal Disorder Father         Paget's disease      Hyperlipidemia Father      Allergies Sister      Other - See Comments Sister         endometriosis     Cerebrovascular Disease Sister 47        stroke after recent ocp start for perimenopausal sx     Cancer Maternal Grandmother         lung     EYE* Maternal Grandfather         glaucoma     Cancer - colorectal Paternal Grandfather      Prostate Cancer Paternal Grandfather      Musculoskeletal Disorder Paternal Uncle         Paget's disease     Breast Cancer Paternal Cousin 55        Second cousin            Allergies:     Allergies   Allergen Reactions      Cat Hair Extract Hives     Dogs Hives     wheezing     Dust Mites      Other reaction(s): Wheezing  eye mucous     Erythromycin      Gluten Meal      Other reaction(s): GI intolerance     Grass Hives     eye mucous     Pollen Extract      Other reaction(s): Other (see comments)  Tree pollen-hives, eye mucous     Ragweeds Hives     eye mucous     Trees      Other reaction(s): Other (see comments)  Sinus issues     Wheat Bran Hives     Other reaction(s): GI intolerance            Medications:     Current Outpatient Medications   Medication Sig Dispense Refill     acetaminophen (TYLENOL 8 HOUR ARTHRITIS PAIN) 650 MG CR tablet        BABY ASPIRIN PO        budesonide (RINOCORT AQUA) 32 MCG/ACT nasal spray Spray 1 spray into both nostrils daily       CLARITIN 10 MG OR TABS 1 TABLET DAILY       fluocinonide (LIDEX) 0.05 % external ointment        ketorolac tromethamine (ACULAR-LS) 0.4 % SOLN ophthalmic solution        ketotifen (ZADITOR/REFRESH ANTI-ITCH) 0.025 % SOLN ophthalmic solution 1 drop       Lidocaine (LIDOCARE) 4 % Patch        methylcellulose (CITRUCEL) 500 MG TABS tablet Take 100 mg by mouth daily       mupirocin (BACTROBAN) 2 % ointment PIPER A SMALL AMOUNT TO NOSTRILS ONCE OR BID  0     polyethylene glycol (MIRALAX) powder Take 1 capful by mouth daily       Pseudoephedrine HCl (SUDAFED PO)        SODIUM CHLORIDE-SODIUM BICARB NA        tacrolimus (PROTOPIC) 0.1 % external ointment        UNABLE TO FIND MEDICATION NAME: Salt Stick Vitassium       vitamin B-12 (CYANOCOBALAMIN) 1000 MCG tablet        vitamin D3 (CHOLECALCIFEROL) 2000 units (50 mcg) tablet Take 1 tablet by mouth              Physical Exam:   Vitals not taken.   Wt Readings from Last 4 Encounters:   11/01/21 98.1 kg (216 lb 4.8 oz)   09/03/21 95.7 kg (211 lb)   08/20/21 86.2 kg (190 lb)   07/19/21 94.8 kg (209 lb)       Constitutional: Obese, appearing stated age; cooperative  MS: No synovitis or tenderness present over MCP, PIP, DIP joints,  bilateral wrists, elbows.  Normal range of motion of shoulders.  No knee effusions.  No tenderness present over bilateral ankles, MTP joint.  She has multiple myofascial tender points, interscapular areas, knees, upper thighs etc.  Psych: nl judgement, orientation, memory, affect.         Data:     No results found for any visits on 02/24/22.    Recent Labs   Lab Test 09/03/21  1204 08/19/20  1141 07/26/19  1540 02/06/18 2034   WBC 9.8 9.6  --  12.7*   RBC 4.11 4.08  --  4.31   HGB 12.0 12.0  --  12.7   HCT 36.9 36.5  --  36.8   MCV 90 90  --  85   RDW 13.3 13.0  --  13.3    313  --  330   ALBUMIN 3.5 3.5 3.6 3.6   CRP  --  9.1*  --   --    BUN 8 10 6* 10      Recent Labs   Lab Test 09/03/21  1204 08/19/20  1141 09/20/17  1111   TSH 1.20 1.32 1.16   T4  --   --  2.66*     Hemoglobin   Date Value Ref Range Status   09/03/2021 12.0 11.7 - 15.7 g/dL Final   08/19/2020 12.0 11.7 - 15.7 g/dL Final   02/06/2018 12.7 11.7 - 15.7 g/dL Final   08/26/2016 12.7 11.7 - 15.7 g/dL Final     Urea Nitrogen   Date Value Ref Range Status   09/03/2021 8 7 - 30 mg/dL Final   08/19/2020 10 7 - 30 mg/dL Final   07/26/2019 6 (L) 7 - 30 mg/dL Final   02/06/2018 10 7 - 30 mg/dL Final     Sed Rate   Date Value Ref Range Status   08/19/2020 38 (H) 0 - 20 mm/h Final     Erythrocyte Sedimentation Rate   Date Value Ref Range Status   09/17/2021 41 (H) 0 - 20 mm/hr Final     CRP Inflammation   Date Value Ref Range Status   09/17/2021 10.9 (H) 0.0 - 8.0 mg/L Final   08/19/2020 9.1 (H) 0.0 - 8.0 mg/L Final     AST   Date Value Ref Range Status   09/03/2021 15 0 - 45 U/L Final   08/19/2020 11 0 - 45 U/L Final   07/26/2019 17 0 - 45 U/L Final   02/06/2018 15 0 - 45 U/L Final     Albumin   Date Value Ref Range Status   09/03/2021 3.5 3.4 - 5.0 g/dL Final   08/19/2020 3.5 3.4 - 5.0 g/dL Final   07/26/2019 3.6 3.4 - 5.0 g/dL Final   02/06/2018 3.6 3.4 - 5.0 g/dL Final     Alkaline Phosphatase   Date Value Ref Range Status   09/03/2021 58 40 -  150 U/L Final   08/19/2020 51 40 - 150 U/L Final   07/26/2019 50 40 - 150 U/L Final   02/06/2018 54 40 - 150 U/L Final     ALT   Date Value Ref Range Status   09/03/2021 28 0 - 50 U/L Final   08/19/2020 24 0 - 50 U/L Final   07/26/2019 31 0 - 50 U/L Final   02/06/2018 26 0 - 50 U/L Final     Rheumatoid Factor   Date Value Ref Range Status   09/17/2021 <7 <20 IU/mL Final     Recent Labs   Lab Test 09/03/21  1204 08/19/20  1141 07/26/19  1540 02/06/18  2034 09/20/17  1111 08/26/16  0941   WBC 9.8 9.6  --  12.7*  --   --    HGB 12.0 12.0  --  12.7  --   --    HCT 36.9 36.5  --  36.8  --   --    MCV 90 90  --  85  --   --     313  --  330  --   --    BUN 8 10 6* 10  --    < >   TSH 1.20 1.32  --   --  1.16  --    AST 15 11 17 15  --   --    ALT 28 24 31 26  --    < >   ALKPHOS 58 51 50 54  --    < >    < > = values in this interval not displayed.       Outside studies reviewed: Records from [facility] and [place] [facility] reviewed    Reviewed Rheumatology lab flowsheet    Assessment     Polyarthralgia  Myofascial pain-fibromyalgia  History of right subclavian vein DVT-1999  Thoracic outlet syndrome status post right first rib resection  POTS, PCOS  Sicca symptoms  Elevated CRP, ESR - 9/2021  Neg RF, Anti CCP, ANCA, KARSTEN panel, dsDNA, Normal C3/C4    Due to recurrent uveitis I have concern about spondyloarthropathy.  MRI pelvis did not show sacroiliitis.  She is HLA-B27 negative.  Will get eye exam reports to confirm diagnosis of uveitis.  She reports mild arthralgia in her hands, stiffness in her feet in the morning.  She has mild tenderness over Achilles tendon on exam.  No significant synovitis noted over her joints.  She might have enthesitis-like symptoms.  If she has recurrent uveitis will discuss with ophthalmology regarding treatment with DMARD's or biologics like Humira.     Sulfasalazine can be considered for peripheral arthritis symptoms.     Recurrent facial flushing, diarrhea,  urticaria symptoms : allergy referral placed.  Will check serum tryptase level.  Recurrent facial flushing, urticaria, diarrhea can be related to mast cell activation syndrome. She can discuss with immunologist.    I will give her short prednisone taper.  She will get a baseline inflammatory markers, ANCA titer for uveitis evaluation. After the prednisone taper will repeat her inflammatory markers again to see if there is any clinical improvement.    I will follow her back in a month       Plan      Please get her eye exam reports     I will send in prednisone taper and then repeat inflammation marker after that     If you have recurrent Uveitis then your symptoms could be related to peripheral Spondyloarthropathy.     Blood tests ordered     Immunology referral     Follow up March 25th, 1:30     Michelle Arias MD  [facility]  Department of Rheumatology & Autoimmune Disorders  [facility]: [phone]  Pager - [phone]

## 2022-02-24 NOTE — RESULT ENCOUNTER NOTE
Results discussed with the patient at the time of visit.     Michelle Arias MD   2/24/2022  2:16 PM

## 2022-02-24 NOTE — PATIENT INSTRUCTIONS
Please get her eye exam reports     I will send in prednisone taper and then repeat inflammation marker after that     If you have recurrent Uveitis then your symptoms could be related to peripheral Spondyloarthropathy.     Blood tests ordered     Immunology referral     Follow up March 25th, 1:30

## 2022-02-25 ENCOUNTER — LAB (OUTPATIENT)
Dept: LAB | Facility: CLINIC | Age: 47
End: 2022-02-25
Payer: COMMERCIAL

## 2022-02-25 DIAGNOSIS — L50.8 RECURRENT URTICARIA: ICD-10-CM

## 2022-02-25 DIAGNOSIS — H20.9 UVEITIS: ICD-10-CM

## 2022-02-25 LAB
CRP SERPL-MCNC: 11.9 MG/L (ref 0–8)
ERYTHROCYTE [SEDIMENTATION RATE] IN BLOOD BY WESTERGREN METHOD: 1 MM/HR (ref 0–20)

## 2022-02-25 PROCEDURE — 86140 C-REACTIVE PROTEIN: CPT

## 2022-02-25 PROCEDURE — 86256 FLUORESCENT ANTIBODY TITER: CPT

## 2022-02-25 PROCEDURE — 85652 RBC SED RATE AUTOMATED: CPT

## 2022-02-25 PROCEDURE — 83520 IMMUNOASSAY QUANT NOS NONAB: CPT

## 2022-02-25 PROCEDURE — 36415 COLL VENOUS BLD VENIPUNCTURE: CPT

## 2022-02-25 PROCEDURE — 86036 ANCA SCREEN EACH ANTIBODY: CPT

## 2022-02-28 LAB
ANCA AB PATTERN SER IF-IMP: NORMAL
C-ANCA TITR SER IF: NORMAL {TITER}

## 2022-03-03 LAB — TRYPTASE SERPL-MCNC: 4.2 UG/L

## 2022-03-16 ENCOUNTER — DOCUMENTATION ONLY (OUTPATIENT)
Dept: LAB | Facility: CLINIC | Age: 47
End: 2022-03-16
Payer: COMMERCIAL

## 2022-03-21 ENCOUNTER — APPOINTMENT (OUTPATIENT)
Dept: LAB | Facility: CLINIC | Age: 47
End: 2022-03-21
Payer: COMMERCIAL

## 2022-03-22 ENCOUNTER — TRANSFERRED RECORDS (OUTPATIENT)
Dept: HEALTH INFORMATION MANAGEMENT | Facility: CLINIC | Age: 47
End: 2022-03-22

## 2022-03-22 ENCOUNTER — LAB (OUTPATIENT)
Dept: LAB | Facility: CLINIC | Age: 47
End: 2022-03-22
Payer: COMMERCIAL

## 2022-03-22 DIAGNOSIS — H20.9 UVEITIS: ICD-10-CM

## 2022-03-22 DIAGNOSIS — R70.0 ELEVATED SED RATE: ICD-10-CM

## 2022-03-22 DIAGNOSIS — L50.8 RECURRENT URTICARIA: ICD-10-CM

## 2022-03-22 LAB
CRP SERPL-MCNC: 3.6 MG/L (ref 0–8)
ERYTHROCYTE [SEDIMENTATION RATE] IN BLOOD BY WESTERGREN METHOD: 27 MM/HR (ref 0–20)

## 2022-03-22 PROCEDURE — 85652 RBC SED RATE AUTOMATED: CPT

## 2022-03-22 PROCEDURE — 86140 C-REACTIVE PROTEIN: CPT

## 2022-03-22 PROCEDURE — 36415 COLL VENOUS BLD VENIPUNCTURE: CPT

## 2022-03-24 PROBLEM — G90.A POTS (POSTURAL ORTHOSTATIC TACHYCARDIA SYNDROME): Status: RESOLVED | Noted: 2021-07-19 | Resolved: 2022-03-24

## 2022-03-25 ENCOUNTER — OFFICE VISIT (OUTPATIENT)
Dept: CARDIOLOGY | Facility: CLINIC | Age: 47
End: 2022-03-25
Attending: NURSE PRACTITIONER
Payer: COMMERCIAL

## 2022-03-25 ENCOUNTER — OFFICE VISIT (OUTPATIENT)
Dept: RHEUMATOLOGY | Facility: CLINIC | Age: 47
End: 2022-03-25
Payer: COMMERCIAL

## 2022-03-25 VITALS
BODY MASS INDEX: 40.03 KG/M2 | WEIGHT: 225.9 LBS | HEIGHT: 63 IN | HEART RATE: 95 BPM | SYSTOLIC BLOOD PRESSURE: 127 MMHG | DIASTOLIC BLOOD PRESSURE: 77 MMHG | OXYGEN SATURATION: 100 %

## 2022-03-25 VITALS
OXYGEN SATURATION: 97 % | HEART RATE: 95 BPM | WEIGHT: 233.7 LBS | BODY MASS INDEX: 41.4 KG/M2 | DIASTOLIC BLOOD PRESSURE: 75 MMHG | SYSTOLIC BLOOD PRESSURE: 123 MMHG

## 2022-03-25 DIAGNOSIS — R00.2 PALPITATIONS: Primary | ICD-10-CM

## 2022-03-25 DIAGNOSIS — R70.0 ELEVATED SED RATE: Primary | ICD-10-CM

## 2022-03-25 DIAGNOSIS — H20.9 UVEITIS: ICD-10-CM

## 2022-03-25 DIAGNOSIS — M47.819 SPONDYLOARTHROPATHY: ICD-10-CM

## 2022-03-25 PROCEDURE — 99214 OFFICE O/P EST MOD 30 MIN: CPT | Performed by: STUDENT IN AN ORGANIZED HEALTH CARE EDUCATION/TRAINING PROGRAM

## 2022-03-25 PROCEDURE — 93005 ELECTROCARDIOGRAM TRACING: CPT

## 2022-03-25 PROCEDURE — G0463 HOSPITAL OUTPT CLINIC VISIT: HCPCS | Mod: 25

## 2022-03-25 PROCEDURE — 99214 OFFICE O/P EST MOD 30 MIN: CPT | Performed by: NURSE PRACTITIONER

## 2022-03-25 RX ORDER — FOLIC ACID 1 MG/1
1 TABLET ORAL DAILY
Qty: 90 TABLET | Refills: 3 | Status: SHIPPED | OUTPATIENT
Start: 2022-03-25 | End: 2022-08-15

## 2022-03-25 ASSESSMENT — PAIN SCALES - GENERAL
PAINLEVEL: NO PAIN (0)
PAINLEVEL: NO PAIN (0)

## 2022-03-25 NOTE — PROGRESS NOTES
Rheumatology clinic visit note          Active Problem List:     Patient Active Problem List    Diagnosis Date Noted     Morbid obesity (H) 09/03/2021     Priority: Medium     Spondylolisthesis at L4-L5 level 09/03/2021     Priority: Medium     Irritable bowel syndrome with diarrhea 09/03/2021     Priority: Medium     Venous thoracic outlet syndrome of right subclavian vein 07/26/2019     Priority: Medium     History of DVT (deep vein thrombosis) 07/26/2019     Priority: Medium     Cystic acne 07/26/2019     Priority: Medium     Menorrhagia with regular cycle 07/26/2019     Priority: Medium     PCOS (polycystic ovarian syndrome) 07/26/2019     Priority: Medium     Antiphospholipid antibody syndrome (H) 08/26/2016     Priority: Medium     GERD (gastroesophageal reflux disease) 10/04/2012     Priority: Medium     CARDIOVASCULAR SCREENING; LDL GOAL LESS THAN 160 10/31/2010     Priority: Medium            History of Present Illness:   Damaris Sarmiento is a 46 year old female with PMH of morbid obesity, PCOS, history of DVT, IBS, POTS, + IgM cardiolipin, GERD evaluated in clinic for follow up evaluation of joint pains.     History from initial visit :  She reports chronic pain in her joints mainly lower back, hip, shoulder, neck.  Other area which is bothersome is her feet which are worse in the morning.  Her ankles feel very stiff in the morning and lock up.  She takes Tylenol 2 tablets twice a day which helps some.  In the morning her fingers are also puffy.  She follows up with orthopedic at [facility] for lower back and hip pain.    She also has chronic dry mouth and dry eyes.  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    She gets skin lesions and has been evaluated by dermatology.  Skin biopsy did not show any evidence of cutaneous lupus.  She has known allergies and  gets on and off sinusitis and uses steroids.  She also see her PCP for POTS disease.   She takes sodium, potassium pills which helps.  In the morning her heart rate is fast and she get dizzy.     She gets pain under her left breast area. No SOB. Denies any malar rash, recurrent mouth/genital ulcers, pleuritic chest pains, recurrent sinusitis/rhinitis, swallowing difficulty, hearing or visual changes recently. She has noticed bluish purple discoloration of her fingertips and has sensitivity to sun.     She has seen Dr. Guerin [facility].  Her first visit was in 12/2018 when she presented with generalized pain, worse in lower back, legs, feet associated with sed rate of 48, CRP 0.7.  In addition she had IBS symptoms, chronic sinus congestion, fatigue.  Autoimmune work-up was unremarkable except for elevated markers of inflammation, low vitamin D level.  No diagnosis of autoimmune connective tissue disease was made at that time. Symptoms were considered related to fibromyalgia, possible early osteoarthritis and PCOS related musculoskeletal symptoms.  Her last visit with Dr. Guerin was in 5/2020, no palpable synovitis was noted in her joints.  She had 12/18 typical fibromyalgia tender points.  She was recommended to follow-up with PCP for fibromyalgia, PCOS and POTS symptoms.    She also has history of prior DVT of the right subclavian vein in 1999 and 2000 with thoracic outlet syndrome status post resection of first right rib.  She received anticoagulation which was discontinued in 2000.  She has seen Dr. Brian Mao at [place] [facility].  Her anticoagulation work-up showed normal von Willebrand profile, positive IgM cardiolipin antibody of 39, negative beta-2 glycoprotein and lupus anticoagulant.  She was not recommended to take lifelong anticoagulation since her right subclavian vein thrombosis was considered related to thoracic outlet syndrome and was treated surgically by first rib resection.     February 24, 2022 - She had another MRI of Lumbar spine  throught Ortho and had steroid injection 3 - 4 weeks ago. SHe is doing PT as well. If she does not take Tylenol arthritis she can not work. Her hands are swollen, sometimes her feet are cramped in the morning. She has chronic constipation.  Colonoscopy done in 6/2021 was normal. If she does not take Miralax she gets constipated. She gets hives, facial lesions. In summer she had hives for 3 weeks. Last uveitis flare was 1 st week of January. Facial flushing happen every day. She take Claritin. She has not seen Allergist.  She has pain in her feet on the medial /interior side. She has stiffness in her hands. wrists francis lock up. She has hip pain all the time. She has POTS.  When she eat she feel it gets stuck.     March 25, 2022 - She has less swelling in her eyes for the last month on prednisone. She use Ketolorac as needed for uveitis. She is doing well on prednisone, she is able to walk long distances. She is able to bend down and is moving better. She is progressing well in PT, she is not stiff in the morning. Fatigue, brain fog is better. Sleeping better. She is on 5 mg daily prednisone. She was seen at [facility] and will be getting further blood tests after being off of prednisone for a month.  She was also recommended to be seen at [facility] for evaluation of mast cell activation syndrome.           Review of Systems:     Review Of Systems  Constitutional: denies fever, chills, night sweats and weight loss.  Skin: + skin rash.  Eyes: + dryness or irritation in eyes. No episode of eye inflammation or redness.   Ears/Nose/Throat: no recurrent sinus infections.  Respiratory: No shortness of breath, dyspnea on exertion, cough, or hemoptysis  Cardiovascular: no chest pain or palpitations.  Gastrointestinal: no nausea, vomiting, abdominal pain.  Normal bowel movements.  Genitourinary: no dysuria, frequency  or hematuria.  Musculoskeletal: as in HPI  Neurologic: no numbness, tingling.  Psychiatric: no  mood disorders.  Hematologic/Lymphatic/Immunologic: no history of easy bruising, petechia or purpura.  No abnormal bleeding.   Endocrine: no h/o thyroid disease or Diabetes.                  Past Medical History:     Past Medical History:   Diagnosis Date     Asthma      Discoid lupus      Diverticulosis of large intestine 06/2021    FOUND SCREENING SCOPE, TRANSVERSE/DESCENDING     GERD (gastroesophageal reflux disease) 10/04/2012     Polycystic ovarian syndrome      POTS (postural orthostatic tachycardia syndrome) 08/2021     RW ALLERGIES/IMMUNOLOGY (ABSTRACTED)      Sinusitis      Subclavian vein thrombosis, right (H) 2000     Past Surgical History:   Procedure Laterality Date     ENDOSCOPIC SINUS SURGERY  7/16/2014    Procedure: ENDOSCOPIC SINUS SURGERY;  Surgeon: Suzi Vieyra MD;  Location: [facility]     EP STUDY TILT TABLE N/A 8/20/2021    Procedure: EP TILT TABLE;  Surgeon: Ilya Gilmore MD;  Location:  [facility]     ORTHOPEDIC SURGERY       TURBINECTOMY  7/16/2014    Procedure: TURBINECTOMY;  Surgeon: Suzi Vieyra MD;  Location: 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     Employer: [employer]   Tobacco Use     Smoking status: Never Smoker     Smokeless tobacco: Never Used   Substance and Sexual Activity     Alcohol use: Yes     Alcohol/week: 0.0 standard drinks     Comment: rare     Drug use: No     Sexual activity: Yes     Partners: Male     Birth control/protection: Male Surgical     Comment: vasectomy            Family History:     Family History   Problem Relation Age of Onset     Osteoporosis Mother      Musculoskeletal Disorder Father         Paget's disease      Hyperlipidemia Father      Allergies Sister      Other - See  Comments Sister         endometriosis     Cerebrovascular Disease Sister 47        stroke after recent ocp start for perimenopausal sx     Cancer Maternal Grandmother         lung     EYE* Maternal Grandfather         glaucoma     Cancer - colorectal Paternal Grandfather      Prostate Cancer Paternal Grandfather      Musculoskeletal Disorder Paternal Uncle         Paget's disease     Breast Cancer Paternal Cousin 55        Second cousin            Allergies:     Allergies   Allergen Reactions     Cat Hair Extract Hives     Dogs Hives     wheezing     Dust Mites      Other reaction(s): Wheezing  eye mucous     Erythromycin      Gluten Meal      Other reaction(s): GI intolerance     Grass Hives     eye mucous     Pollen Extract      Other reaction(s): Other (see comments)  Tree pollen-hives, eye mucous     Ragweeds Hives     eye mucous     Trees      Other reaction(s): Other (see comments)  Sinus issues     Wheat Bran Hives     Other reaction(s): GI intolerance            Medications:     Current Outpatient Medications   Medication Sig Dispense Refill     acetaminophen (TYLENOL 8 HOUR ARTHRITIS PAIN) 650 MG CR tablet        BABY ASPIRIN PO        budesonide (RINOCORT AQUA) 32 MCG/ACT nasal spray Spray 1 spray into both nostrils daily       CLARITIN 10 MG OR TABS 1 TABLET DAILY       fluocinonide (LIDEX) 0.05 % external ointment        ketorolac tromethamine (ACULAR-LS) 0.4 % SOLN ophthalmic solution        ketotifen (ZADITOR/REFRESH ANTI-ITCH) 0.025 % SOLN ophthalmic solution 1 drop       Lidocaine (LIDOCARE) 4 % Patch        methylcellulose (CITRUCEL) 500 MG TABS tablet Take 100 mg by mouth daily       mupirocin (BACTROBAN) 2 % ointment PIPER A SMALL AMOUNT TO NOSTRILS ONCE OR BID  0     polyethylene glycol (MIRALAX) powder Take 1 capful by mouth daily       predniSONE (DELTASONE) 5 MG tablet 4tab=20 mg qd x 5 days, 3tab=15 mg qd x 5 days, 2tab=10 mg qd x 5 days, 7.5 mg x 5 days, 1 tab=5 mg qd 100 tablet 2      Pseudoephedrine HCl (SUDAFED PO)        SODIUM CHLORIDE-SODIUM BICARB NA        tacrolimus (PROTOPIC) 0.1 % external ointment        UNABLE TO FIND MEDICATION NAME: Salt Stick Vitassium       vitamin B-12 (CYANOCOBALAMIN) 1000 MCG tablet        vitamin D3 (CHOLECALCIFEROL) 2000 units (50 mcg) tablet Take 1 tablet by mouth              Physical Exam:   Vitals not taken.   Wt Readings from Last 4 Encounters:   03/25/22 102.5 kg (225 lb 14.4 oz)   11/01/21 98.1 kg (216 lb 4.8 oz)   09/03/21 95.7 kg (211 lb)   08/20/21 86.2 kg (190 lb)       Constitutional: Obese, appearing stated age; cooperative  MS: No synovitis or tenderness present over MCP, PIP, DIP joints, bilateral wrists, elbows.  Normal range of motion of shoulders.  No knee effusions.  No tenderness present over bilateral ankles, MTP joint.  She has multiple myofascial tender points, interscapular areas, knees, upper thighs etc.  Psych: nl judgement, orientation, memory, affect.         Data:     No results found for any visits on 03/25/22.    Recent Labs   Lab Test 09/03/21  1204 08/19/20  1141 07/26/19  1540 02/06/18  2034   WBC 9.8 9.6  --  12.7*   RBC 4.11 4.08  --  4.31   HGB 12.0 12.0  --  12.7   HCT 36.9 36.5  --  36.8   MCV 90 90  --  85   RDW 13.3 13.0  --  13.3    313  --  330   ALBUMIN 3.5 3.5 3.6 3.6   CRP  --  9.1*  --   --    BUN 8 10 6* 10      Recent Labs   Lab Test 09/03/21  1204 08/19/20  1141 09/20/17  1111   TSH 1.20 1.32 1.16   T4  --   --  2.66*     Hemoglobin   Date Value Ref Range Status   09/03/2021 12.0 11.7 - 15.7 g/dL Final   08/19/2020 12.0 11.7 - 15.7 g/dL Final   02/06/2018 12.7 11.7 - 15.7 g/dL Final   08/26/2016 12.7 11.7 - 15.7 g/dL Final     Urea Nitrogen   Date Value Ref Range Status   09/03/2021 8 7 - 30 mg/dL Final   08/19/2020 10 7 - 30 mg/dL Final   07/26/2019 6 (L) 7 - 30 mg/dL Final   02/06/2018 10 7 - 30 mg/dL Final     Sed Rate   Date Value Ref Range Status   08/19/2020 38 (H) 0 - 20 mm/h Final      Erythrocyte Sedimentation Rate   Date Value Ref Range Status   03/22/2022 27 (H) 0 - 20 mm/hr Final   02/25/2022 1 0 - 20 mm/hr Final   09/17/2021 41 (H) 0 - 20 mm/hr Final     CRP Inflammation   Date Value Ref Range Status   03/22/2022 3.6 0.0 - 8.0 mg/L Final   02/25/2022 11.9 (H) 0.0 - 8.0 mg/L Final   09/17/2021 10.9 (H) 0.0 - 8.0 mg/L Final   08/19/2020 9.1 (H) 0.0 - 8.0 mg/L Final     AST   Date Value Ref Range Status   09/03/2021 15 0 - 45 U/L Final   08/19/2020 11 0 - 45 U/L Final   07/26/2019 17 0 - 45 U/L Final   02/06/2018 15 0 - 45 U/L Final     Albumin   Date Value Ref Range Status   09/03/2021 3.5 3.4 - 5.0 g/dL Final   08/19/2020 3.5 3.4 - 5.0 g/dL Final   07/26/2019 3.6 3.4 - 5.0 g/dL Final   02/06/2018 3.6 3.4 - 5.0 g/dL Final     Alkaline Phosphatase   Date Value Ref Range Status   09/03/2021 58 40 - 150 U/L Final   08/19/2020 51 40 - 150 U/L Final   07/26/2019 50 40 - 150 U/L Final   02/06/2018 54 40 - 150 U/L Final     ALT   Date Value Ref Range Status   09/03/2021 28 0 - 50 U/L Final   08/19/2020 24 0 - 50 U/L Final   07/26/2019 31 0 - 50 U/L Final   02/06/2018 26 0 - 50 U/L Final     Rheumatoid Factor   Date Value Ref Range Status   09/17/2021 <7 <20 IU/mL Final     Recent Labs   Lab Test 09/03/21  1204 08/19/20  1141 07/26/19  1540 02/06/18  2034 09/20/17  1111 08/26/16  0941   WBC 9.8 9.6  --  12.7*  --   --    HGB 12.0 12.0  --  12.7  --   --    HCT 36.9 36.5  --  36.8  --   --    MCV 90 90  --  85  --   --     313  --  330  --   --    BUN 8 10 6* 10  --    < >   TSH 1.20 1.32  --   --  1.16  --    AST 15 11 17 15  --   --    ALT 28 24 31 26  --    < >   ALKPHOS 58 51 50 54  --    < >    < > = values in this interval not displayed.       Outside studies reviewed: Records from arthritis rheumatology consultant and [place] [facility] reviewed    Reviewed Rheumatology lab flowsheet    Assessment     Polyarthralgia  Myofascial pain-fibromyalgia  History of right subclavian vein  DVT-1999  Thoracic outlet syndrome status post right first rib resection  POTS, PCOS  Sicca symptoms  Elevated CRP, ESR   Neg RF, Anti CCP, ANCA, KARSTEN panel, dsDNA, Normal C3/C4    Polyarthralgia : She had arthralgia in her hands, feet, shoulders, lower back, neck area. Stiffness in her feet in the morning.  She has mild tenderness over Achilles tendon on exam. She was given prednisone taper and did notice marked improvement with prednisone taper. Her CRP normalized to 3.6. ESR still elevated though. Since she has noticed significant improvement, it supports the possibility of inflammatory arthritis especially Spondyloarthropathy in the setting of recurrent uveitis flares. I discussed regarding trial of methotrexate as steroid sparing agent. MRI pelvis did not show sacroiliitis.  She is HLA-B27 negative.     The risks were discussed including fatigue, nausea, oral ulcers, increased risk of infection, pulmonary fibrosis, pancytopenia and elevated LFTs. Patient was informed that folic acid could help to minimize side effects. Was informed to avoid drinking ETOH since it could cause hepatotoxicity. Informed her that MTX is teratogenic and she should not become pregnant on this drug. MTX is slow acting, it takes 4-6 weeks to show benefit.     She is going to get immunology work-up and was recommended to be off of prednisone for a month to get blood test done.  She will confirm with her immunologist before starting methotrexate.  Once she is okay to start, then she will take 4 tablets once a week and increase by 1 tablet every week to reach at 6 tablet once a week dose.    Will do methotrexate monitoring labs in a month.    Chronic low back pain: She reports chronic low back pain.  MRI pelvis done in 12/2021 did not show any evidence of sacroiliitis.    Recurrent facial flushing, diarrhea, urticaria symptoms : allergy referral placed.  Recurrent facial flushing, urticaria, diarrhea can be related to mast cell activation  syndrome. She can discuss with immunologist.    Plan      We can start Methotrexate and continue prednisone. 4 tab once a week and increase by 1 tab every week to reach at 6 tab once a week dose.    Discuss with your immunologist before starting it    Follow up in 6 - 8 weeks May 20th     Michelle Arias MD  [facility]  Department of Rheumatology & Autoimmune Disorders  [facility]: [phone]  Pager - [phone]     TT 30 min was spent on date of the encounter doing chart review, history and exam, documentation and further activities as noted above. Any prior notes, outside records, laboratory results, and imaging studies were reviewed if relevant.    Patient verbalized agreement with and understanding of the rationale for the diagnosis and treatment plan.  All questions were answered to best of my ability and the patient's satisfaction.      Chart documentation done in part with Dragon Voice recognition Software. Although reviewed after completion, some word and grammatical error may remain.

## 2022-03-25 NOTE — RESULT ENCOUNTER NOTE
Results discussed with the patient at the time of visit.     Michelle Arias MD   3/25/2022  1:46 PM

## 2022-03-25 NOTE — NURSING NOTE
Chief Complaint   Patient presents with     Follow Up     Return EP- 4 mo f/u for palpitations, negative tilt on 7/2021     Vitals were taken, medications reconciled, and EKG was performed.    BRENNAN Morataya  4:32 PM

## 2022-03-25 NOTE — RESULT ENCOUNTER NOTE
Results discussed with the patient at the time of visit.     Michelle Arias MD   3/25/2022  1:45 PM

## 2022-03-25 NOTE — PATIENT INSTRUCTIONS
We can start Methotrexate and continue prednisone    Discuss with your immunologist before starting it    Follow up in 6 - 8 weeks May 20th

## 2022-03-25 NOTE — PROGRESS NOTES
ELECTROPHYSIOLOGY CLINIC VISIT    Assessment/Recommendations   Assessment/Plan:    Ms. Sarmiento is a 46 year old female who has a past medical history significant for POTS, GERD, PCOS, right subclavian vein thrombosis, discoid lupus, diverticulosis, and asthma. She presents today for follow up. She reports feeling at baseline. She continues to increase her hydration and salt intake. She has had some dizziness with position changes. She denies chest discomfort, abdominal fullness/bloating or peripheral edema, shortness of breath, paroxysmal nocturnal dyspnea, orthopnea, pre-syncope, or syncope. Presenting 12 lead ECG shows NSR Vent Rate 99 bpm,  ms, QRS 72 ms, QTc 420 ms. Current cardiac medications include: ASA and salt tabs.     Postural lightheadedness:  - Use compression shorts (athletic compression shorts)  - Continued increase hydration with goal to take in 80 oz of water/electrolyte fluids per day.    - Eat six small meals per day with focus on foods low in carbohydrates and low in gluten.  - Liberalize salt intake  - Change positions carefully and slowly and maintain safe environment.   -Standing training and supine isometric exercises.   -She feels symptoms are adequately controlled now. Discussed can consider addition of medications if conservative measures are not working well.     Follow up on an as needed basis.        History of Present Illness/Subjective    Ms. Damaris Sarmiento is a 46 year old female who comes in today for EP follow-up of POTS.    Ms. Sarmiento is a 46 year old female who has a past medical history significant for POTS, GERD, PCOS, right subclavian vein thrombosis, discoid lupus, diverticulosis, and asthma.     EP Visit 7/19/21: Patient reports exerperiencing fast heart rates in the mornings or evenings. She says that her heart rate can go up from 60 to 90s while laying down. She adds that is difficult to stand up when she wakes up. She needs to meditate at least 30 min before  "incorporating into her daily activities otherwise she feels bad. During some of her fast heart rate episodes, she also feels dizzy and lightheaded. And she has been close enough to fall. No syncope or lightheadeness. Her symptoms started 2 years ago. Before that, she was able to have a \"normal life\". She used to go for long bike rides with her . But this is no longer possible due to her symptoms.      She says having sleeping difficulties. She feels puffy. She reports rapid weight gain at least 15 pounds during the previous months. She thinks she has fluid retention. She thinks that COVID-19 vaccine (4/2021) might've triggered all the aforementioned symptoms.      One year ago, she discovered in the social media a POTS group. She felt identified with those patients. Since then she has been trying to increase her water and salt intake (sodium chloride). She doesn't know if she is taking enough sodium chloride pills but she feels that her symptoms have remarkably improved \" game changer\" since she started these pills.      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       No family hx of immunologic conditios. Her sister is experiencing similar symptoms and she had a recent stroke.       EP Visit 11/11/21: Patient reports doing better since she started taking extra sodium chloride pills with her meals.  Overall her palpitations have improved.  Denies syncope, lightheadedness or chest pain.  She continues to have GI symptoms for which she has had extensive work-up with negative findings.  She came accompanied by her  which also reports improvement of her symptoms.    She presents today for follow up. She reports feeling at baseline. She continues to increase her hydration and salt intake. She has had some dizziness with position changes. She denies chest discomfort, abdominal fullness/bloating or peripheral edema, shortness of breath, paroxysmal nocturnal " dyspnea, orthopnea, pre-syncope, or syncope. Presenting 12 lead ECG shows NSR Vent Rate 99 bpm,  ms, QRS 72 ms, QTc 420 ms. Current cardiac medications include: ASA and salt tabs.         I have reviewed and updated the patient's Past Medical History, Social History, Family History and Medication List.     Cardiographics (Personally Reviewed) :   Reviewed.        Physical Examination   /75 (BP Location: Left arm, Patient Position: Supine, Cuff Size: Adult Large)   Pulse 95   Wt 106 kg (233 lb 11.2 oz)   SpO2 97%   BMI 41.40 kg/m    Wt Readings from Last 3 Encounters:   11/01/21 98.1 kg (216 lb 4.8 oz)   09/03/21 95.7 kg (211 lb)   08/20/21 86.2 kg (190 lb)     General Appearance:   Alert, well-appearing and in no acute distress.   HEENT: Atraumatic, normocephalic. PERRL.  MMM.   Chest/Lungs:   Respirations unlabored.  Lungs are clear to auscultation.   Cardiovascular:   Regular rate and rhythm.  S1/S2. No murmur.    Abdomen:  Soft, nontender, nondistended.   Extremities: No cyanosis or clubbing. No edema.    Musculoskeletal: Moves all extremities.  Gait normal.   Skin: Warm, dry, intact.    Neurologic: Mood and affect are appropriate.  Alert and oriented to person, place, time, and situation.          Medications  Allergies   Current Outpatient Medications   Medication Sig Dispense Refill     acetaminophen (TYLENOL 8 HOUR ARTHRITIS PAIN) 650 MG CR tablet        BABY ASPIRIN PO        budesonide (RINOCORT AQUA) 32 MCG/ACT nasal spray Spray 1 spray into both nostrils daily       CLARITIN 10 MG OR TABS 1 TABLET DAILY       fluocinonide (LIDEX) 0.05 % external ointment        ketorolac tromethamine (ACULAR-LS) 0.4 % SOLN ophthalmic solution        ketotifen (ZADITOR/REFRESH ANTI-ITCH) 0.025 % SOLN ophthalmic solution 1 drop       Lidocaine (LIDOCARE) 4 % Patch        methylcellulose (CITRUCEL) 500 MG TABS tablet Take 100 mg by mouth daily       mupirocin (BACTROBAN) 2 % ointment PIPER A SMALL AMOUNT TO  NOSTRILS ONCE OR BID  0     polyethylene glycol (MIRALAX) powder Take 1 capful by mouth daily       predniSONE (DELTASONE) 5 MG tablet 4tab=20 mg qd x 5 days, 3tab=15 mg qd x 5 days, 2tab=10 mg qd x 5 days, 7.5 mg x 5 days, 1 tab=5 mg qd 100 tablet 2     Pseudoephedrine HCl (SUDAFED PO)        SODIUM CHLORIDE-SODIUM BICARB NA        tacrolimus (PROTOPIC) 0.1 % external ointment        UNABLE TO FIND MEDICATION NAME: Salt Stick Vitassium       vitamin B-12 (CYANOCOBALAMIN) 1000 MCG tablet        vitamin D3 (CHOLECALCIFEROL) 2000 units (50 mcg) tablet Take 1 tablet by mouth      Allergies   Allergen Reactions     Cat Hair Extract Hives     Dogs Hives     wheezing     Dust Mites      Other reaction(s): Wheezing  eye mucous     Erythromycin      Gluten Meal      Other reaction(s): GI intolerance     Grass Hives     eye mucous     Pollen Extract      Other reaction(s): Other (see comments)  Tree pollen-hives, eye mucous     Ragweeds Hives     eye mucous     Trees      Other reaction(s): Other (see comments)  Sinus issues     Wheat Bran Hives     Other reaction(s): GI intolerance         Lab Results (Personally Reviewed)    Chemistry/lipid CBC Cardiac Enzymes/BNP/TSH/INR   Lab Results   Component Value Date    BUN 8 09/03/2021     09/03/2021    CO2 26 09/03/2021     Creatinine   Date Value Ref Range Status   09/03/2021 0.68 0.52 - 1.04 mg/dL Final   08/19/2020 0.65 0.52 - 1.04 mg/dL Final       Lab Results   Component Value Date    CHOL 233 (H) 09/03/2021    HDL 70 09/03/2021     (H) 09/03/2021      Lab Results   Component Value Date    WBC 9.8 09/03/2021    HGB 12.0 09/03/2021    HCT 36.9 09/03/2021    MCV 90 09/03/2021     09/03/2021    Lab Results   Component Value Date    TSH 1.20 09/03/2021    INR 1.0 09/22/2015        The patient states understanding and is agreeable with the plan.    Suzi Beebe, APRN CNP  Electrophysiology Consult Service  Pager: [phone]

## 2022-03-25 NOTE — NURSING NOTE
"Chief Complaint   Patient presents with     RECHECK     Recurrent urticaria       Vitals:    03/25/22 1338   BP: 127/77   BP Location: Left arm   Patient Position: Sitting   Cuff Size: Adult Large   Pulse: 95   SpO2: 100%   Weight: 102.5 kg (225 lb 14.4 oz)   Height: 1.6 m (5' 3\")       Body mass index is 40.02 kg/m .    Blanca Haley MA    "

## 2022-03-25 NOTE — LETTER
3/25/2022      RE: Damaris Sarmiento  [address] [address]       Dear Colleague,    Thank you for the opportunity to participate in the care of your patient, Damaris Sarmiento, at the [facility] [place] [facility]. Please see a copy of my visit note below.        ELECTROPHYSIOLOGY CLINIC VISIT    Assessment/Recommendations   Assessment/Plan:    Ms. Sarmiento is a 46 year old female who has a past medical history significant for POTS, GERD, PCOS, right subclavian vein thrombosis, discoid lupus, diverticulosis, and asthma. She presents today for follow up. She reports feeling at baseline. She continues to increase her hydration and salt intake. She has had some dizziness with position changes. She denies chest discomfort, abdominal fullness/bloating or peripheral edema, shortness of breath, paroxysmal nocturnal dyspnea, orthopnea, pre-syncope, or syncope. Presenting 12 lead ECG shows NSR Vent Rate 99 bpm,  ms, QRS 72 ms, QTc 420 ms. Current cardiac medications include: ASA and salt tabs.     Postural lightheadedness:  - Use compression shorts (athletic compression shorts)  - Continued increase hydration with goal to take in 80 oz of water/electrolyte fluids per day.    - Eat six small meals per day with focus on foods low in carbohydrates and low in gluten.  - Liberalize salt intake  - Change positions carefully and slowly and maintain safe environment.   -Standing training and supine isometric exercises.   -She feels symptoms are adequately controlled now. Discussed can consider addition of medications if conservative measures are not working well.     Follow up on an as needed basis.        History of Present Illness/Subjective    Ms. Damaris Sarmiento is a 46 year old female who comes in today for EP follow-up of POTS.    Ms. Sarmiento is a 46 year old female who has a past medical history significant for POTS, GERD, PCOS, right  "subclavian vein thrombosis, discoid lupus, diverticulosis, and asthma.     EP Visit 7/19/21: Patient reports exerperiencing fast heart rates in the mornings or evenings. She says that her heart rate can go up from 60 to 90s while laying down. She adds that is difficult to stand up when she wakes up. She needs to meditate at least 30 min before incorporating into her daily activities otherwise she feels bad. During some of her fast heart rate episodes, she also feels dizzy and lightheaded. And she has been close enough to fall. No syncope or lightheadeness. Her symptoms started 2 years ago. Before that, she was able to have a \"normal life\". She used to go for long bike rides with her . But this is no longer possible due to her symptoms.      She says having sleeping difficulties. She feels puffy. She reports rapid weight gain at least 15 pounds during the previous months. She thinks she has fluid retention. She thinks that COVID-19 vaccine (4/2021) might've triggered all the aforementioned symptoms.      One year ago, she discovered in the social media a POTS group. She felt identified with those patients. Since then she has been trying to increase her water and salt intake (sodium chloride). She doesn't know if she is taking enough sodium chloride pills but she feels that her symptoms have remarkably improved \" game changer\" since she started these pills.      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       No family hx of immunologic conditios. Her sister is experiencing similar symptoms and she had a recent stroke.       EP Visit 11/11/21: Patient reports doing better since she started taking extra sodium chloride pills with her meals.  Overall her palpitations have improved.  Denies syncope, lightheadedness or chest pain.  She continues to have GI symptoms for which she has had extensive work-up with negative findings.  She came accompanied by her  " which also reports improvement of her symptoms.    She presents today for follow up. She reports feeling at baseline. She continues to increase her hydration and salt intake. She has had some dizziness with position changes. She denies chest discomfort, abdominal fullness/bloating or peripheral edema, shortness of breath, paroxysmal nocturnal dyspnea, orthopnea, pre-syncope, or syncope. Presenting 12 lead ECG shows NSR Vent Rate 99 bpm,  ms, QRS 72 ms, QTc 420 ms. Current cardiac medications include: ASA and salt tabs.         I have reviewed and updated the patient's Past Medical History, Social History, Family History and Medication List.     Cardiographics (Personally Reviewed) :   Reviewed.        Physical Examination   /75 (BP Location: Left arm, Patient Position: Supine, Cuff Size: Adult Large)   Pulse 95   Wt 106 kg (233 lb 11.2 oz)   SpO2 97%   BMI 41.40 kg/m    Wt Readings from Last 3 Encounters:   11/01/21 98.1 kg (216 lb 4.8 oz)   09/03/21 95.7 kg (211 lb)   08/20/21 86.2 kg (190 lb)     General Appearance:   Alert, well-appearing and in no acute distress.   HEENT: Atraumatic, normocephalic. PERRL.  MMM.   Chest/Lungs:   Respirations unlabored.  Lungs are clear to auscultation.   Cardiovascular:   Regular rate and rhythm.  S1/S2. No murmur.    Abdomen:  Soft, nontender, nondistended.   Extremities: No cyanosis or clubbing. No edema.    Musculoskeletal: Moves all extremities.  Gait normal.   Skin: Warm, dry, intact.    Neurologic: Mood and affect are appropriate.  Alert and oriented to person, place, time, and situation.          Medications  Allergies   Current Outpatient Medications   Medication Sig Dispense Refill     acetaminophen (TYLENOL 8 HOUR ARTHRITIS PAIN) 650 MG CR tablet        BABY ASPIRIN PO        budesonide (RINOCORT AQUA) 32 MCG/ACT nasal spray Spray 1 spray into both nostrils daily       CLARITIN 10 MG OR TABS 1 TABLET DAILY       fluocinonide (LIDEX) 0.05 % external  ointment        ketorolac tromethamine (ACULAR-LS) 0.4 % SOLN ophthalmic solution        ketotifen (ZADITOR/REFRESH ANTI-ITCH) 0.025 % SOLN ophthalmic solution 1 drop       Lidocaine (LIDOCARE) 4 % Patch        methylcellulose (CITRUCEL) 500 MG TABS tablet Take 100 mg by mouth daily       mupirocin (BACTROBAN) 2 % ointment PIPER A SMALL AMOUNT TO NOSTRILS ONCE OR BID  0     polyethylene glycol (MIRALAX) powder Take 1 capful by mouth daily       predniSONE (DELTASONE) 5 MG tablet 4tab=20 mg qd x 5 days, 3tab=15 mg qd x 5 days, 2tab=10 mg qd x 5 days, 7.5 mg x 5 days, 1 tab=5 mg qd 100 tablet 2     Pseudoephedrine HCl (SUDAFED PO)        SODIUM CHLORIDE-SODIUM BICARB NA        tacrolimus (PROTOPIC) 0.1 % external ointment        UNABLE TO FIND MEDICATION NAME: Salt Stick Vitassium       vitamin B-12 (CYANOCOBALAMIN) 1000 MCG tablet        vitamin D3 (CHOLECALCIFEROL) 2000 units (50 mcg) tablet Take 1 tablet by mouth      Allergies   Allergen Reactions     Cat Hair Extract Hives     Dogs Hives     wheezing     Dust Mites      Other reaction(s): Wheezing  eye mucous     Erythromycin      Gluten Meal      Other reaction(s): GI intolerance     Grass Hives     eye mucous     Pollen Extract      Other reaction(s): Other (see comments)  Tree pollen-hives, eye mucous     Ragweeds Hives     eye mucous     Trees      Other reaction(s): Other (see comments)  Sinus issues     Wheat Bran Hives     Other reaction(s): GI intolerance         Lab Results (Personally Reviewed)    Chemistry/lipid CBC Cardiac Enzymes/BNP/TSH/INR   Lab Results   Component Value Date    BUN 8 09/03/2021     09/03/2021    CO2 26 09/03/2021     Creatinine   Date Value Ref Range Status   09/03/2021 0.68 0.52 - 1.04 mg/dL Final   08/19/2020 0.65 0.52 - 1.04 mg/dL Final       Lab Results   Component Value Date    CHOL 233 (H) 09/03/2021    HDL 70 09/03/2021     (H) 09/03/2021      Lab Results   Component Value Date    WBC 9.8 09/03/2021    HGB 12.0  09/03/2021    HCT 36.9 09/03/2021    MCV 90 09/03/2021     09/03/2021    Lab Results   Component Value Date    TSH 1.20 09/03/2021    INR 1.0 09/22/2015        The patient states understanding and is agreeable with the plan.    ANGELIKA Ahn CNP  Electrophysiology Consult Service  Pager: [phone]

## 2022-03-28 ENCOUNTER — HOSPITAL ENCOUNTER (OUTPATIENT)
Dept: OCCUPATIONAL THERAPY | Facility: CLINIC | Age: 47
Discharge: HOME | End: 2022-03-28
Payer: COMMERCIAL

## 2022-03-28 DIAGNOSIS — Z86.718 HISTORY OF DVT (DEEP VEIN THROMBOSIS): ICD-10-CM

## 2022-03-28 DIAGNOSIS — I87.1 VENOUS THORACIC OUTLET SYNDROME OF RIGHT SUBCLAVIAN VEIN: Primary | ICD-10-CM

## 2022-03-28 DIAGNOSIS — I89.0 LYMPHEDEMA OF ARM: ICD-10-CM

## 2022-03-28 LAB
ATRIAL RATE - MUSE: 99 BPM
DIASTOLIC BLOOD PRESSURE - MUSE: NORMAL MMHG
INTERPRETATION ECG - MUSE: NORMAL
P AXIS - MUSE: 66 DEGREES
PR INTERVAL - MUSE: 126 MS
QRS DURATION - MUSE: 72 MS
QT - MUSE: 328 MS
QTC - MUSE: 420 MS
R AXIS - MUSE: 68 DEGREES
SYSTOLIC BLOOD PRESSURE - MUSE: NORMAL MMHG
T AXIS - MUSE: 23 DEGREES
VENTRICULAR RATE- MUSE: 99 BPM

## 2022-03-28 PROCEDURE — 97140 MANUAL THERAPY 1/> REGIONS: CPT | Mod: GO

## 2022-03-30 ENCOUNTER — HOSPITAL ENCOUNTER (OUTPATIENT)
Dept: OCCUPATIONAL THERAPY | Facility: CLINIC | Age: 47
Discharge: HOME | End: 2022-03-30
Payer: COMMERCIAL

## 2022-03-30 ENCOUNTER — LAB (OUTPATIENT)
Dept: LAB | Facility: CLINIC | Age: 47
End: 2022-03-30
Payer: COMMERCIAL

## 2022-03-30 DIAGNOSIS — R70.0 ELEVATED SED RATE: ICD-10-CM

## 2022-03-30 DIAGNOSIS — H20.9 UVEITIS: ICD-10-CM

## 2022-03-30 DIAGNOSIS — I87.1 VENOUS THORACIC OUTLET SYNDROME OF RIGHT SUBCLAVIAN VEIN: Primary | ICD-10-CM

## 2022-03-30 DIAGNOSIS — M47.819 SPONDYLOARTHROPATHY: ICD-10-CM

## 2022-03-30 DIAGNOSIS — I89.0 LYMPHEDEMA OF ARM: ICD-10-CM

## 2022-03-30 PROCEDURE — 87340 HEPATITIS B SURFACE AG IA: CPT

## 2022-03-30 PROCEDURE — 86481 TB AG RESPONSE T-CELL SUSP: CPT

## 2022-03-30 PROCEDURE — 86704 HEP B CORE ANTIBODY TOTAL: CPT

## 2022-03-30 PROCEDURE — 86803 HEPATITIS C AB TEST: CPT

## 2022-03-30 PROCEDURE — 97140 MANUAL THERAPY 1/> REGIONS: CPT | Mod: GO

## 2022-03-30 PROCEDURE — 36415 COLL VENOUS BLD VENIPUNCTURE: CPT

## 2022-03-31 LAB
HBV CORE AB SERPL QL IA: NONREACTIVE
HBV SURFACE AG SERPL QL IA: NONREACTIVE
HCV AB SERPL QL IA: NONREACTIVE
QUANTIFERON MITOGEN: 10 IU/ML
QUANTIFERON NIL TUBE: 0.02 IU/ML
QUANTIFERON TB1 TUBE: 0.02 IU/ML
QUANTIFERON TB2 TUBE: 0.02

## 2022-04-01 ENCOUNTER — HOSPITAL ENCOUNTER (OUTPATIENT)
Dept: OCCUPATIONAL THERAPY | Facility: CLINIC | Age: 47
Discharge: HOME | End: 2022-04-01
Payer: COMMERCIAL

## 2022-04-01 DIAGNOSIS — I89.0 LYMPHEDEMA OF ARM: ICD-10-CM

## 2022-04-01 DIAGNOSIS — I87.1 VENOUS THORACIC OUTLET SYNDROME OF RIGHT SUBCLAVIAN VEIN: Primary | ICD-10-CM

## 2022-04-01 LAB
GAMMA INTERFERON BACKGROUND BLD IA-ACNC: 0.02 IU/ML
M TB IFN-G BLD-IMP: NEGATIVE
M TB IFN-G CD4+ BCKGRND COR BLD-ACNC: 9.98 IU/ML
MITOGEN IGNF BCKGRD COR BLD-ACNC: 0 IU/ML
MITOGEN IGNF BCKGRD COR BLD-ACNC: 0 IU/ML

## 2022-04-01 PROCEDURE — 97140 MANUAL THERAPY 1/> REGIONS: CPT | Mod: GO

## 2022-04-04 ENCOUNTER — HOSPITAL ENCOUNTER (OUTPATIENT)
Dept: OCCUPATIONAL THERAPY | Facility: CLINIC | Age: 47
Discharge: HOME | End: 2022-04-04
Payer: COMMERCIAL

## 2022-04-04 DIAGNOSIS — I89.0 LYMPHEDEMA OF ARM: Primary | ICD-10-CM

## 2022-04-04 DIAGNOSIS — I89.0 LYMPHEDEMA: ICD-10-CM

## 2022-04-04 DIAGNOSIS — I87.1 VENOUS THORACIC OUTLET SYNDROME OF RIGHT SUBCLAVIAN VEIN: ICD-10-CM

## 2022-04-04 PROCEDURE — 97140 MANUAL THERAPY 1/> REGIONS: CPT | Mod: GO

## 2022-04-05 NOTE — ADDENDUM NOTE
Encounter addended by: Sarah Dc, OT on: 4/5/2022 7:49 AM   Actions taken: Order list changed, Diagnosis association updated, Flowsheet accepted

## 2022-04-06 ENCOUNTER — HOSPITAL ENCOUNTER (OUTPATIENT)
Dept: OCCUPATIONAL THERAPY | Facility: CLINIC | Age: 47
Discharge: HOME | End: 2022-04-06
Payer: COMMERCIAL

## 2022-04-06 DIAGNOSIS — I87.1 VENOUS THORACIC OUTLET SYNDROME OF RIGHT SUBCLAVIAN VEIN: ICD-10-CM

## 2022-04-06 DIAGNOSIS — I89.0 LYMPHEDEMA OF ARM: Primary | ICD-10-CM

## 2022-04-06 PROCEDURE — 97140 MANUAL THERAPY 1/> REGIONS: CPT | Mod: GO

## 2022-04-08 ENCOUNTER — HOSPITAL ENCOUNTER (OUTPATIENT)
Dept: OCCUPATIONAL THERAPY | Facility: CLINIC | Age: 47
Discharge: HOME | End: 2022-04-08
Payer: COMMERCIAL

## 2022-04-08 ENCOUNTER — MYC MEDICAL ADVICE (OUTPATIENT)
Dept: OBGYN | Facility: CLINIC | Age: 47
End: 2022-04-08

## 2022-04-08 PROCEDURE — 97140 MANUAL THERAPY 1/> REGIONS: CPT | Mod: GO

## 2022-04-11 ENCOUNTER — TELEPHONE (OUTPATIENT)
Dept: VASCULAR SURGERY | Facility: CLINIC | Age: 47
End: 2022-04-11
Payer: COMMERCIAL

## 2022-04-11 ENCOUNTER — MEDICAL CORRESPONDENCE (OUTPATIENT)
Dept: HEALTH INFORMATION MANAGEMENT | Facility: CLINIC | Age: 47
End: 2022-04-11
Payer: COMMERCIAL

## 2022-04-11 NOTE — TELEPHONE ENCOUNTER
New vascular referral   Preferred Location: [facility]   [facility] as prescribed by the provider.  If you don t hear from a representative within 2 business days, please call the number listed above.   Reason for Referral: Venous Thoracic outlet syndrome     Referring provider: Ilya Gilmore MD     Most recent imaging? None    Additional info: 47 y/o female with PMHx of TOS s/p R rib resection in 2000, hx DVT in R subclavian vein, POTS. Not on chronic anticoagulation. Continues to have chronic RUE swelling and has been following with lymphedema therapy and wearing compression as tolerated. She follows with rheumatology for generalized joint pain and possible fibromyalgia. She is referred to vascular medicine for her ongoing lymphedema     I contacted pt to gather more info about this referral but was unable to reach her. I left a detailed voicemail with my callback number and asked her to return my call at her convenience.    GIO Dean, RN  [facility] - [facility] Vascular Surgery  Ph: [phone]  Fax: [phone]

## 2022-04-11 NOTE — TELEPHONE ENCOUNTER
Vascular Clinic Appointment Request    Imaging needed? Yes. Orders placed.     Visit type: in person visit    Provider: Dr. Leos OR Dr. Hidalgo    Timeframe: Pt's convenience    Appt notes: S/p R rib resection TOS; RUE lymphedema/chronic swelling    Additional info: None    Documentation routed to clinic coordinators for scheduling.      SUZIE DeanN, RN  [facility] - [facility] Vascular Surgery  Ph: [phone]  Fax: [phone]

## 2022-04-11 NOTE — TELEPHONE ENCOUNTER
Referring providers name, location, phone number and fax: Ilya Gilmore MD  [facility]    Reason for visit: Venous thoracic outlet syndrome of right subclavian vein    Does patient have a referral:yes    Referral to specific provider? NA    Medical records or notes if possible: Yes

## 2022-04-21 ENCOUNTER — TELEPHONE (OUTPATIENT)
Dept: OTHER | Facility: CLINIC | Age: 47
End: 2022-04-21
Payer: COMMERCIAL

## 2022-04-21 NOTE — TELEPHONE ENCOUNTER
The pt request to be seen at [facility]  and was given the contact information.  Sam Treviño on 4/21/2022 at 4:07 PM

## 2022-04-21 NOTE — TELEPHONE ENCOUNTER
[facility]    Who is the name of the provider?:      What is the location you see this provider at?:     Reason for call:  Sam from [facility] called the patient to make an appointment for the Vascular referral from Dr. Gilmore.  She previously spoke with the nurse Emerita on 4/11/22  from Vascular Medicine.    Patient would like to make an appointment at the [place] location as it is closer to her.  Sam and Emerita recommended she make an appointment with either Dr. Hidalgo or Dr. Leos.    Please review and advise next steps.      The vascular referral says pending review.      Can we leave a detailed message on this number?  YES

## 2022-04-22 NOTE — TELEPHONE ENCOUNTER
Pt referred to [facility] by Dr. Ilya Gilmore for chronic RUE swelling, s/p right first rib resection in 2000.    Pt needs to be scheduled for consult with Vascular Medicine (pt requesting Dr. Hidalgo or Dr. Leos).  Will route to scheduling to coordinate an appointment at next available.    Appt note: Ref by Dr. Ilya Gilmore for chronic RUE swelling, s/p right first rib resection in 2000.      SUZIE XavierN, RN-[facility] [place]

## 2022-04-26 NOTE — TELEPHONE ENCOUNTER
Left voicemail    Sharad Sanchez I   [id number] [facility] [address]  [place] [address] [phone]

## 2022-04-26 NOTE — TELEPHONE ENCOUNTER
Future Appointments   Date Time Provider Department Center   5/4/2022  3:40 PM Jose Enrique Hidalgo MD [facility]   5/12/2022  1:30 PM Sarah Dc, OT [facility] [address]   5/20/2022 10:00 AM Michelle Arias MD [facility]    6/17/2022  1:15 PM Sarah Dc, OT [facility] [address]   6/24/2022  2:30 PM Michelle Arias MD [facility]    7/15/2022  1:15 PM Sarah Dc, OT [facility] [address]   8/19/2022  1:15 PM Sarah Dc, OT [facility] [address]     Ref by Dr. Ilya Gilmore for chronic RUE swelling, s/p right first rib resection in 2000.    Sharad Sanchez I   [address]  [place] [address] [place] [address] [phone]

## 2022-05-04 ENCOUNTER — OFFICE VISIT (OUTPATIENT)
Dept: OTHER | Facility: CLINIC | Age: 47
End: 2022-05-04
Attending: INTERNAL MEDICINE
Payer: COMMERCIAL

## 2022-05-04 VITALS
SYSTOLIC BLOOD PRESSURE: 153 MMHG | WEIGHT: 228.6 LBS | DIASTOLIC BLOOD PRESSURE: 79 MMHG | OXYGEN SATURATION: 100 % | BODY MASS INDEX: 40.5 KG/M2 | HEART RATE: 102 BPM | HEIGHT: 63 IN

## 2022-05-04 DIAGNOSIS — G54.0 TOS (THORACIC OUTLET SYNDROME): Primary | ICD-10-CM

## 2022-05-04 DIAGNOSIS — I89.0 LYMPHEDEMA: ICD-10-CM

## 2022-05-04 PROCEDURE — 99205 OFFICE O/P NEW HI 60 MIN: CPT | Performed by: INTERNAL MEDICINE

## 2022-05-04 PROCEDURE — G0463 HOSPITAL OUTPT CLINIC VISIT: HCPCS

## 2022-05-04 NOTE — PROGRESS NOTES
INITIAL VASCULAR MEDICINE ASSESSMENT  REFERRAL SOURCE: DR. MERARI JOSEPH  REASON FOR CONSULT: S/p R rib resection for TOS in 2000; now w/ chronic RUE lymphedema/chronic swelling    HPI: Damaris Sarmiento is a 46 year old female who in 2000 developed a RUE DVT which was ultimately found to be due to venous TOS. She underwent partial first rib resection, was treated with warfarin for 6 months, and then discontinued it. She notes perpetual RUE swelling , congestion, and discomfort since the original procedure. A 9/22/2016 arterial and venous duplex with TOS maneuvers found the patient to have dampening of axillary artery and vein waveforms with arm extension She was seen consultatively at [facility] by Dr. Toth who also dxd her with lymphedema. This has responded very well to MLD. She is going to also be getting a FlexiTouch pump to manage this at home. She is looking to establish care with a  MD to mange her lymphedema and venous insuficiency form her original RUE DVT. She has no known recurrence of thrombosis.    Review Of Systems  Skin: negative  Eyes: negative  Ears/Nose/Throat: negative  Respiratory: No shortness of breath, dyspnea on exertion, cough, or hemoptysis  Cardiovascular: negative  Gastrointestinal: negative  Genitourinary: negative  Musculoskeletal: as above  Neurologic: negative  Psychiatric: negative  Hematologic/Lymphatic/Immunologic: negative  Endocrine: negative      PAST MEDICAL HISTORY:                  Past Medical History:   Diagnosis Date     Asthma      Discoid lupus      Diverticulosis of large intestine 06/2021    FOUND SCREENING SCOPE, TRANSVERSE/DESCENDING     GERD (gastroesophageal reflux disease) 10/04/2012     Polycystic ovarian syndrome      POTS (postural orthostatic tachycardia syndrome) 08/2021     RW ALLERGIES/IMMUNOLOGY (ABSTRACTED)      Sinusitis      Subclavian vein thrombosis, right (H) 2000       PAST SURGICAL HISTORY:                  Past Surgical History:   Procedure  Laterality Date     ENDOSCOPIC SINUS SURGERY  7/16/2014    Procedure: ENDOSCOPIC SINUS SURGERY;  Surgeon: Suzi Vieyra MD;  Location: [facility]     EP STUDY TILT TABLE N/A 8/20/2021    Procedure: EP TILT TABLE;  Surgeon: Ilya Gilmore MD;  Location:  HE[facility]ARDI[facility] CATH LAB     ORTHOPEDIC SURGERY       TURBINECTOMY  7/16/2014    Procedure: TURBINECTOMY;  Surgeon: Szui Vieyra MD;  Location:  NONSPECIFIC PROCEDURE  12/2000    resection of first rib     [facility] NONSPECIFIC PROCEDURE  1992    left foot surgery       CURRENT MEDICATIONS:                  Current Outpatient Medications   Medication Sig Dispense Refill     acetaminophen (TYLENOL 8 HOUR ARTHRITIS PAIN) 650 MG CR tablet        BABY ASPIRIN PO        budesonide (RINOCORT AQUA) 32 MCG/ACT nasal spray Spray 1 spray into both nostrils daily       CLARITIN 10 MG OR TABS 1 TABLET DAILY       fluocinonide (LIDEX) 0.05 % external ointment        folic acid (FOLVITE) 1 MG tablet Take 1 tablet (1 mg) by mouth daily 90 tablet 3     ketorolac tromethamine (ACULAR-LS) 0.4 % SOLN ophthalmic solution        ketotifen (ZADITOR/REFRESH ANTI-ITCH) 0.025 % SOLN ophthalmic solution 1 drop       Lidocaine (LIDOCARE) 4 % Patch        methotrexate 2.5 MG tablet 4 tab once a week and increase by 1 tab every week to reach at 6 tab once a week dose. (Patient not taking: Reported on 3/25/2022) 24 tablet 2     methylcellulose (CITRUCEL) 500 MG TABS tablet Take 100 mg by mouth daily       mupirocin (BACTROBAN) 2 % ointment PIEPR A SMALL AMOUNT TO NOSTRILS ONCE OR BID  0     polyethylene glycol (MIRALAX) powder Take 1 capful by mouth daily       predniSONE (DELTASONE) 5 MG tablet 4tab=20 mg qd x 5 days, 3tab=15 mg qd x 5 days, 2tab=10 mg qd x 5 days, 7.5 mg x 5 days, 1 tab=5 mg qd 100 tablet 2     Pseudoephedrine HCl (SUDAFED PO)        SODIUM CHLORIDE-SODIUM BICARB NA        tacrolimus (PROTOPIC) 0.1 % external ointment        UNABLE TO FIND MEDICATION NAME:  Salt Stick Vitassium       vitamin B-12 (CYANOCOBALAMIN) 1000 MCG tablet        vitamin D3 (CHOLECALCIFEROL) 2000 units (50 mcg) tablet Take 1 tablet by mouth         ALLERGIES:                  Allergies   Allergen Reactions     Adhesive Tape Hives     Cat Hair Extract Hives     Dogs Hives     wheezing     Dust Mites      Other reaction(s): Wheezing  eye mucous     Erythromycin      Gluten Meal      Other reaction(s): GI intolerance     Grass Hives     eye mucous     Pollen Extract      Other reaction(s): Other (see comments)  Tree pollen-hives, eye mucous     Ragweeds Hives     eye mucous     Trees      Other reaction(s): Other (see comments)  Sinus issues     Wheat Bran Hives     Other reaction(s): GI intolerance       SOCIAL HISTORY:                  Social History     Socioeconomic History     Marital status:      Spouse name: JAMES     Number of children: Not on file     Years of education: Not on file     Highest education level: Not on file   Occupational History     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 Not Asked   Social History Narrative     Not on file     Social Determinants of Health     Financial Resource Strain: Not on file   Food Insecurity: Not on file   Transportation Needs: Not on file   Physical Activity: Not on file   Stress: Not on file   Social Connections: Not on file   Intimate Partner Violence: Not on file   Housing Stability: Not on file       FAMILY HISTORY:                   Family History   Problem Relation Age  of Onset     Osteoporosis Mother      Musculoskeletal Disorder Father         Paget's disease      Hyperlipidemia Father      Allergies Sister      Other - See Comments Sister         endometriosis     Cerebrovascular Disease Sister 47        stroke after recent ocp start for perimenopausal sx     Cancer Maternal Grandmother         lung     EYE* Maternal Grandfather         glaucoma     Cancer - colorectal Paternal Grandfather      Prostate Cancer Paternal Grandfather      Musculoskeletal Disorder Paternal Uncle         Paget's disease     Breast Cancer Paternal Cousin 55        Second cousin         Physical exam Reveals:    O/P: WNL  HEENT: WNL  NECK: No JVD, thyromegaly, or lymphadenopathy  HEART: RRR, no murmurs, gallops, or rubs  LUNGS: CTA bilaterally without rales, wheezes, or rhonchi  GI: NABS, nondistended, nontender, soft  EXT:without cyanosis, clubbing; RUE CEAP C3 venous insufficiency. Unable to assess for lymphedema as the patietn had been wearing her compression sleeve up to the time of the appt.  NEURO: nonfocal  : no flank tenderness        A/P:    (G54.0) TOS (thoracic outlet syndrome)  (primary encounter diagnosis)  Comment: I explained to her that I would not look to undertake intervention or surgical revision unless she has thrombosis in the area presently. Check below imaging, RTC one week later.  Plan: US Upper Ext Arterial Duplex Bilateral, US         Lower Extremity Venous Duplex Bilateral            (I89.0) Lymphedema  Comment: we would look to manage the above symptomatically and conservatively with the belwo and  Lymphedema pump.  Plan: Lymphedema Therapy Referral         71 minutes total medical care on today's date including pre and post charting, interview and exam of the patient, formulation of and communication of the above plan to the patietn and her .

## 2022-05-04 NOTE — PROGRESS NOTES
"Patient is here to discuss ref by Dr. Ilya Gilmore for chronic RUE swelling, s/p right first rib resection in 2000.    BP (!) 153/79 (BP Location: Left arm, Patient Position: Chair, Cuff Size: Adult Large)   Pulse 102   Ht 5' 3\" (1.6 m)   Wt 228 lb 9.6 oz (103.7 kg)   SpO2 100%   BMI 40.49 kg/m      Questions patient would like addressed today are: N/A.    Refills are needed: N/A    Has homecare services and agency name:  [facility]    "

## 2022-05-06 ENCOUNTER — TELEPHONE (OUTPATIENT)
Dept: OTHER | Facility: CLINIC | Age: 47
End: 2022-05-06
Payer: COMMERCIAL

## 2022-05-06 NOTE — TELEPHONE ENCOUNTER
Follow-up to 5/4/22      BUE Venous US & BUE Arterial US in the next couple of weeks     in clinic visit in one month

## 2022-05-12 ENCOUNTER — HOSPITAL ENCOUNTER (OUTPATIENT)
Dept: OCCUPATIONAL THERAPY | Facility: CLINIC | Age: 47
Discharge: HOME | End: 2022-05-12
Payer: COMMERCIAL

## 2022-05-12 DIAGNOSIS — I89.0 LYMPHEDEMA OF ARM: Primary | ICD-10-CM

## 2022-05-12 DIAGNOSIS — I89.0 LYMPHEDEMA: ICD-10-CM

## 2022-05-12 DIAGNOSIS — I87.1 VENOUS THORACIC OUTLET SYNDROME OF RIGHT SUBCLAVIAN VEIN: ICD-10-CM

## 2022-05-12 PROCEDURE — 97140 MANUAL THERAPY 1/> REGIONS: CPT | Mod: GO

## 2022-05-12 NOTE — NURSING NOTE
Pneumatic compression device referral faxed to [facility] #[phone].    Drea POWERS, RN    [facility]  Office: [phone]  Fax: [phone]

## 2022-05-16 ENCOUNTER — TRANSFERRED RECORDS (OUTPATIENT)
Dept: HEALTH INFORMATION MANAGEMENT | Facility: CLINIC | Age: 47
End: 2022-05-16
Payer: COMMERCIAL

## 2022-06-06 ENCOUNTER — TRANSFERRED RECORDS (OUTPATIENT)
Dept: HEALTH INFORMATION MANAGEMENT | Facility: CLINIC | Age: 47
End: 2022-06-06
Payer: COMMERCIAL

## 2022-06-06 ENCOUNTER — MYC MEDICAL ADVICE (OUTPATIENT)
Dept: CARDIOLOGY | Facility: CLINIC | Age: 47
End: 2022-06-06
Payer: COMMERCIAL

## 2022-06-06 DIAGNOSIS — G90.A POTS (POSTURAL ORTHOSTATIC TACHYCARDIA SYNDROME): Primary | ICD-10-CM

## 2022-06-07 ENCOUNTER — TELEPHONE (OUTPATIENT)
Dept: OTHER | Facility: CLINIC | Age: 47
End: 2022-06-07
Payer: COMMERCIAL

## 2022-06-07 ENCOUNTER — HOSPITAL ENCOUNTER (OUTPATIENT)
Dept: ULTRASOUND IMAGING | Facility: CLINIC | Age: 47
Discharge: HOME | End: 2022-06-07
Attending: INTERNAL MEDICINE
Payer: COMMERCIAL

## 2022-06-07 DIAGNOSIS — G54.0 TOS (THORACIC OUTLET SYNDROME): ICD-10-CM

## 2022-06-07 PROCEDURE — 93930 UPPER EXTREMITY STUDY: CPT

## 2022-06-07 PROCEDURE — 93970 EXTREMITY STUDY: CPT

## 2022-06-07 NOTE — TELEPHONE ENCOUNTER
Called patient to let her know that Dr. Hidalgo is out ill today.  Her appointment with him will be cancelled. She will still come in for her ultrasounds; she will be called if there are any urgent findings.      She will have a virtual follow up with Sabiha Calderon PA-C on 6/16/22

## 2022-06-16 ENCOUNTER — VIRTUAL VISIT (OUTPATIENT)
Dept: OTHER | Facility: CLINIC | Age: 47
End: 2022-06-16
Attending: INTERNAL MEDICINE
Payer: COMMERCIAL

## 2022-06-16 DIAGNOSIS — I89.0 LYMPHEDEMA: ICD-10-CM

## 2022-06-16 DIAGNOSIS — G54.0 TOS (THORACIC OUTLET SYNDROME): Primary | ICD-10-CM

## 2022-06-16 PROCEDURE — 99213 OFFICE O/P EST LOW 20 MIN: CPT | Mod: GT | Performed by: PHYSICIAN ASSISTANT

## 2022-06-16 NOTE — PROGRESS NOTES
[facility]  VASCULAR MEDICINE FOLLOW-UP VISIT      PRIMARY HEALTH CARE PROVIDER:  No Ref-Primary, Physician    REASON FOR VISIT:  Follow up to imaging of upper extremities      HPI: Damaris Sarmiento is a very pleasant 46 year old female who in 2000 developed a RUE DVT which was ultimately found to be due to venous TOS. She underwent partial first rib resection, was treated with warfarin for 6 months, and then discontinued it. She notes perpetual RUE swelling , congestion, and discomfort since the original procedure. A 9/22/2016 arterial and venous duplex with TOS maneuvers found the patient to have dampening of axillary artery and vein waveforms with arm extension She was seen consultatively at [facility] by Dr. Toth who also diagnosed her with lymphedema. This has responded very well to manual lymphatic drainage. She she was also able to get a FlexiTouch pump to manage this at home. She has no known recurrence of thrombosis. When seen by Dr. Hidalgo to establish care with a Vascular Clinic in the [facility], a lymphedema therapy referral was placed and venous and arterial duplex of the upper extremities with TOS maneuvers was undertaken. She presents today to discuss these results.       PAST MEDICAL HISTORY  Past Medical History:   Diagnosis Date     Asthma      Discoid lupus      Diverticulosis of large intestine 06/2021    FOUND SCREENING SCOPE, TRANSVERSE/DESCENDING     GERD (gastroesophageal reflux disease) 10/04/2012     Polycystic ovarian syndrome      POTS (postural orthostatic tachycardia syndrome) 08/2021     RW ALLERGIES/IMMUNOLOGY (ABSTRACTED)      Sinusitis      Subclavian vein thrombosis, right (H) 2000       PAST SURGICAL HISTORY:  Past Surgical History:   Procedure Laterality Date     ENDOSCOPIC SINUS SURGERY  7/16/2014    Procedure: ENDOSCOPIC SINUS SURGERY;  Surgeon: Suzi Vieyra MD;  Location: [facility]     EP STUDY TILT TABLE N/A 8/20/2021    Procedure: EP TILT TABLE;   Surgeon: Ilya Gilmore MD;  Location:  HEART CARDIAC CATH LAB     ORTHOPEDIC SURGERY       TURBINECTOMY  7/16/2014    Procedure: TURBINECTOMY;  Surgeon: Suzi Vieyra MD;  Location: [facility] NONSPECIFIC PROCEDURE  12/2000    resection of first rib     [facility] NONSPECIFIC PROCEDURE  1992    left foot surgery         CURRENT MEDICATIONS  acetaminophen (TYLENOL) 650 MG CR tablet,   BABY ASPIRIN PO,   budesonide (RINOCORT AQUA) 32 MCG/ACT nasal spray, Spray 1 spray into both nostrils daily  CLARITIN 10 MG OR TABS, 1 TABLET DAILY  fluocinonide (LIDEX) 0.05 % external ointment,   folic acid (FOLVITE) 1 MG tablet, Take 1 tablet (1 mg) by mouth daily  ketorolac tromethamine (ACULAR-LS) 0.4 % SOLN ophthalmic solution,   ketotifen (ZADITOR/REFRESH ANTI-ITCH) 0.025 % SOLN ophthalmic solution, 1 drop  Lidocaine (LIDOCARE) 4 % Patch,   methotrexate 2.5 MG tablet, 4 tab once a week and increase by 1 tab every week to reach at 6 tab once a week dose.  methylcellulose (CITRUCEL) 500 MG TABS tablet, Take 100 mg by mouth daily  mupirocin (BACTROBAN) 2 % ointment, PIPER A SMALL AMOUNT TO NOSTRILS ONCE OR BID  polyethylene glycol (MIRALAX) 17 GM/Dose powder, Take 1 capful by mouth daily  predniSONE (DELTASONE) 5 MG tablet, 4tab=20 mg qd x 5 days, 3tab=15 mg qd x 5 days, 2tab=10 mg qd x 5 days, 7.5 mg x 5 days, 1 tab=5 mg qd  Pseudoephedrine HCl (SUDAFED PO),   SODIUM CHLORIDE-SODIUM BICARB NA,   tacrolimus (PROTOPIC) 0.1 % external ointment,   UNABLE TO FIND, MEDICATION NAME: Salt Stick Vitassium  vitamin B-12 (CYANOCOBALAMIN) 1000 MCG tablet,   vitamin D3 (CHOLECALCIFEROL) 2000 units (50 mcg) tablet, Take 1 tablet by mouth    No current facility-administered medications on file prior to visit.      ALLERGIES     Allergies   Allergen Reactions     Adhesive Tape Hives     Cat Hair Extract Hives     Dogs Hives     wheezing     Dust Mites      Other reaction(s): Wheezing  eye mucous     Erythromycin      Gluten Meal       Other reaction(s): GI intolerance     Grass Hives     eye mucous     Pollen Extract      Other reaction(s): Other (see comments)  Tree pollen-hives, eye mucous     Ragweeds Hives     eye mucous     Trees      Other reaction(s): Other (see comments)  Sinus issues     Wheat Bran Hives     Other reaction(s): GI intolerance       FAMILY HISTORY  Family History   Problem Relation Age of Onset     Osteoporosis Mother      Musculoskeletal Disorder Father         Paget's disease      Hyperlipidemia Father      Allergies Sister      Other - See Comments Sister         endometriosis     Cerebrovascular Disease Sister 47        stroke after recent ocp start for perimenopausal sx     Cancer Maternal Grandmother         lung     EYE* Maternal Grandfather         glaucoma     Cancer - colorectal Paternal Grandfather      Prostate Cancer Paternal Grandfather      Musculoskeletal Disorder Paternal Uncle         Paget's disease     Breast Cancer Paternal Cousin 55        Second cousin       SOCIAL HISTORY  Social History     Socioeconomic History     Marital status:      Spouse name: JAMES     Number of children: Not on file     Years of education: Not on file     Highest education level: Not on file   Occupational History     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 Not Asked       ROS:   Complete ROS negative other than what is stated in HPI.     EXAM:  There were no vitals taken for this visit.  In general, the patient is a pleasant female in no apparent distress.    Unable to do  complete exam as this was a virtual visit.     Labs:  None    Procedures:     ULTRASOUND BILATERAL UPPER EXTREMITY ARTERIAL DUPLEX   6/7/2022 1:21PM      HISTORY: TOS (thoracic outlet syndrome).     COMPARISON: None.     FINDINGS: Color Doppler and spectral waveform analysis was performed  throughout the bilateral subclavian arteries with arm in neutral, 90  degrees, 180 degrees,  press,  press head turned  right, and  press head turned left. The bilateral subclavian  arteries are patent with all maneuvers without evidence of  compression, stenosis or occlusion. Waveforms are triphasic  throughout.                                                                      IMPRESSION: Patent bilateral subclavian arteries with all maneuvers.      ULTRASOUND BILATERAL UPPER EXTREMITY VENOUS DUPLEX  6/7/2022 1:21 PM      HISTORY: TOS (thoracic outlet syndrome).     COMPARISON: None.     FINDINGS: Color Doppler spectral waveform analysis was performed  throughout the bilateral subclavian veins with the arm in neutral, 90  degrees, 180 degrees,  press,  press head turned  right, and  press head turned left. The bilateral subclavian  veins occlude with the arm at 90 degrees. There is also narrowing of  the right subclavian vein with  press maneuvers. There is  narrowing of the left subclavian vein with the arm at 180 degrees.                                                                      IMPRESSION: Occlusion of the bilateral subclavian veins with the arm  at 90 degrees, compression of the left subclavian vein with the arm at  180 degrees, and narrowing/compression of the right subclavian vein  with  maneuvers.    Assessment and Plan:   (G54.0) TOS (thoracic outlet syndrome)     Comment: Chronic swelling in RUE with prior history of TOS and partial right rib resection. Repeat arterial duplex shows bilateral subclavian arteries are patent with all maneuvers.  Venous duplex shows bilateral subclavian veins occlude with the arm at 90 degrees with narrowing of the right subclavian vein with  press maneuvers. There is also narrowing of the left subclavian vein with the arm at 180 degrees.    Plan:  - would not look to undertake intervention or surgical revision unless she has thrombosis in the area. She states they took as much rib as they could before they felt they would damage all the attached nerves.   -Manage conservatively.              (I89.0) Lymphedema RUE    Comment: Under control with her exercises and Lymphedema pump.   Plan: Continue to follow-up with Lymphedema Therapy         Sabiha Calderon PA-C    Video Visit Details    Type of Service: Video Visit    Video Start Time: 1:25  Video End Time: 1:35    Originating Location (patient location): Home    Distant Location (provider location): [facility]    Mode of Communication:  Video Conference via [employer]    This visit is being conducted as a virtual visit due to the emphasis on mitigation of the COVID-19 virus pandemic. The clinician has decided that the risk of an in-office visit outweighs the benefit for this patient.             25 minutes spent on the date of the encounter doing chart review, history and exam, documentation, and further activities as noted above.

## 2022-06-16 NOTE — PROGRESS NOTES
Damaris is a 46 year old who is being evaluated via a billable video visit.      How would you like to obtain your AVS? MyChart  If the video visit is dropped, the invitation should be resent by: Text to cell phone: [phone]  Will anyone else be joining your video visit? No          Anatsasia Gutiérrez MA

## 2022-06-17 ENCOUNTER — HOSPITAL ENCOUNTER (OUTPATIENT)
Dept: OCCUPATIONAL THERAPY | Facility: CLINIC | Age: 47
Discharge: HOME | End: 2022-06-17
Payer: COMMERCIAL

## 2022-06-17 DIAGNOSIS — I89.0 LYMPHEDEMA OF ARM: Primary | ICD-10-CM

## 2022-06-17 DIAGNOSIS — I87.1 VENOUS THORACIC OUTLET SYNDROME OF RIGHT SUBCLAVIAN VEIN: ICD-10-CM

## 2022-06-17 DIAGNOSIS — I89.0 LYMPHEDEMA: ICD-10-CM

## 2022-06-17 DIAGNOSIS — Z86.718 HISTORY OF DVT (DEEP VEIN THROMBOSIS): ICD-10-CM

## 2022-06-17 PROCEDURE — 97140 MANUAL THERAPY 1/> REGIONS: CPT | Mod: GO

## 2022-06-24 ENCOUNTER — OFFICE VISIT (OUTPATIENT)
Dept: RHEUMATOLOGY | Facility: CLINIC | Age: 47
End: 2022-06-24
Payer: COMMERCIAL

## 2022-06-24 VITALS
WEIGHT: 229 LBS | HEIGHT: 63 IN | BODY MASS INDEX: 40.57 KG/M2 | HEART RATE: 96 BPM | SYSTOLIC BLOOD PRESSURE: 138 MMHG | DIASTOLIC BLOOD PRESSURE: 81 MMHG | OXYGEN SATURATION: 100 %

## 2022-06-24 DIAGNOSIS — R70.0 ELEVATED SED RATE: Primary | ICD-10-CM

## 2022-06-24 DIAGNOSIS — R76.8 ANA POSITIVE: ICD-10-CM

## 2022-06-24 DIAGNOSIS — M25.50 ARTHRALGIA, UNSPECIFIED JOINT: ICD-10-CM

## 2022-06-24 PROCEDURE — 99214 OFFICE O/P EST MOD 30 MIN: CPT | Performed by: STUDENT IN AN ORGANIZED HEALTH CARE EDUCATION/TRAINING PROGRAM

## 2022-06-24 ASSESSMENT — PAIN SCALES - GENERAL: PAINLEVEL: MILD PAIN (3)

## 2022-06-24 NOTE — PROGRESS NOTES
Rheumatology clinic visit note          Active Problem List:     Patient Active Problem List    Diagnosis Date Noted     Morbid obesity (H) 09/03/2021     Priority: Medium     Spondylolisthesis at L4-L5 level 09/03/2021     Priority: Medium     Irritable bowel syndrome with diarrhea 09/03/2021     Priority: Medium     Venous thoracic outlet syndrome of right subclavian vein 07/26/2019     Priority: Medium     History of DVT (deep vein thrombosis) 07/26/2019     Priority: Medium     Cystic acne 07/26/2019     Priority: Medium     Menorrhagia with regular cycle 07/26/2019     Priority: Medium     PCOS (polycystic ovarian syndrome) 07/26/2019     Priority: Medium     Antiphospholipid antibody syndrome (H) 08/26/2016     Priority: Medium     GERD (gastroesophageal reflux disease) 10/04/2012     Priority: Medium     CARDIOVASCULAR SCREENING; LDL GOAL LESS THAN 160 10/31/2010     Priority: Medium            History of Present Illness:   Damaris Sarmiento is a 46 year old female with PMH of morbid obesity, PCOS, history of DVT, IBS, POTS, + IgM cardiolipin, GERD evaluated in clinic for follow up evaluation of joint pains.     History from initial visit :  She reports chronic pain in her joints mainly lower back, hip, shoulder, neck.  Other area which is bothersome is her feet which are worse in the morning.  Her ankles feel very stiff in the morning and lock up.  She takes Tylenol 2 tablets twice a day which helps some.  In the morning her fingers are also puffy.  She follows up with orthopedic at [facility] for lower back and hip pain.    She also has chronic dry mouth and dry eyes.  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    She gets skin lesions and has been evaluated by dermatology.  Skin biopsy did not show any evidence of cutaneous lupus.  She has known allergies and  gets on and off sinusitis and uses steroids.  She also see her PCP for POTS disease.   She takes sodium, potassium pills which helps.  In the morning her heart rate is fast and she get dizzy.     She gets pain under her left breast area. No SOB. Denies any malar rash, recurrent mouth/genital ulcers, pleuritic chest pains, recurrent sinusitis/rhinitis, swallowing difficulty, hearing or visual changes recently. She has noticed bluish purple discoloration of her fingertips and has sensitivity to sun.     She has [facility] Dr. Guerin [facility].  Her first visit was in 12/2018 when she presented with generalized pain, worse in lower back, legs, feet associated with sed rate of 48, CRP 0.7.  In addition she had IBS symptoms, chronic sinus congestion, fatigue.  Autoimmune work-up was unremarkable except for elevated markers of inflammation, low vitamin D level.  No diagnosis of autoimmune connective tissue disease was made at that time. Symptoms were considered related to fibromyalgia, possible early osteoarthritis and PCOS related musculoskeletal symptoms.  Her last visit with Dr. Guerin was in 5/2020, no palpable synovitis was noted in her joints.  She had 12/18 typical fibromyalgia tender points.  She was recommended to follow-up with PCP for fibromyalgia, PCOS and POTS symptoms.    She also has history of prior DVT of the right subclavian vein in 1999 and 2000 with thoracic outlet syndrome status post resection of first right rib.  She received anticoagulation which was discontinued in 2000.  She has seen Dr. Brian Mao at [facility].  Her anticoagulation work-up showed normal von Willebrand profile, positive IgM cardiolipin antibody of 39, negative beta-2 glycoprotein and lupus anticoagulant.  She was not recommended to take lifelong anticoagulation since her right subclavian vein thrombosis was considered related to thoracic outlet syndrome and was treated surgically by first rib resection.     February 24, 2022 - She had another MRI of Lumbar spine  throught Ortho and had steroid injection 3 - 4 weeks ago. SHe is doing PT as well. If she does not take Tylenol arthritis she can not work. Her hands are swollen, sometimes her feet are cramped in the morning. She has chronic constipation.  Colonoscopy done in 6/2021 was normal. If she does not take Miralax she gets constipated. She gets hives, facial lesions. In summer she had hives for 3 weeks. Last uveitis flare was 1 st week of January. Facial flushing happen every day. She take Claritin. She has not seen Allergist.  She has pain in her feet on the medial /interior side. She has stiffness in her hands. wrists francis lock up. She has hip pain all the time. She has POTS.  When she eat she feel it gets stuck.     March 25, 2022 - She has less swelling in her eyes for the last month on prednisone. She use Ketolorac as needed for uveitis. She is doing well on prednisone, she is able to walk long distances. She is able to bend down and is moving better. She is progressing well in PT, she is not stiff in the morning. Fatigue, brain fog is better. Sleeping better. She is on 5 mg daily prednisone. She was seen at [facility] and will be getting further blood tests after being off of prednisone for a month.  She was also recommended to be seen at [facility] for evaluation of mast cell activation syndrome.    June 24, 2022 - She was seen by Allergist at [facility] for evaluation of Mast cell syndrome. She was given Levo cetrizine but shortly after that developed severe diarrhea and 3 pounds weight loss. She had to get IV fluids. 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 She was also seen by Immunologist and her work up was normal. She gets random pain episodes when she arms or ankle will lock up. Once in a while she has pain in her wrists. Her back is feeling better after PT. She is also seeing GI for constipation and diarrhea issues. Her colonoscopy was normal.   According to  her she gets recurrent uveitis flares in her eyes and use Ketorolac eye drops as needed. Her main concerns are diarrhea, globus sensation in her chest, fatigue, brain fog, uveitis flares and intermittent joint pains.          Review of Systems:     Review Of Systems  Constitutional: denies fever, chills, night sweats and weight loss.  Skin: + skin rash.  Eyes: + dryness or irritation in eyes. No episode of eye inflammation or redness.   Ears/Nose/Throat: no recurrent sinus infections.  Respiratory: No shortness of breath, dyspnea on exertion, cough, or hemoptysis  Cardiovascular: no chest pain or palpitations.  Gastrointestinal: no nausea, vomiting, abdominal pain.  Normal bowel movements.  Genitourinary: no dysuria, frequency  or hematuria.  Musculoskeletal: as in HPI  Neurologic: no numbness, tingling.  Psychiatric: no mood disorders.  Hematologic/Lymphatic/Immunologic: no history of easy bruising, petechia or purpura.  No abnormal bleeding.   Endocrine: no h/o thyroid disease or Diabetes.                  Past Medical History:     Past Medical History:   Diagnosis Date     Asthma      Discoid lupus      Diverticulosis of large intestine 06/2021    FOUND SCREENING SCOPE, TRANSVERSE/DESCENDING     GERD (gastroesophageal reflux disease) 10/04/2012     Polycystic ovarian syndrome      POTS (postural orthostatic tachycardia syndrome) 08/2021     RW ALLERGIES/IMMUNOLOGY (ABSTRACTED)      Sinusitis      Subclavian vein thrombosis, right (H) 2000     Past Surgical History:   Procedure Laterality Date     ENDOSCOPIC SINUS SURGERY  7/16/2014    Procedure: ENDOSCOPIC SINUS SURGERY;  Surgeon: Suzi Vieyra MD;  Location: [facility] STUDY TILT TABLE N/A 8/20/2021    Procedure: EP TILT TABLE;  Surgeon: Ilya Gilmore MD;  Location:  [facility] CATH LAB     ORTHOPEDIC SURGERY       TURBINECTOMY  7/16/2014    Procedure: TURBINECTOMY;  Surgeon: Suzi Vieyra MD;  Location: 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     Employer: [employer]   Tobacco Use     Smoking status: Never Smoker     Smokeless tobacco: Never Used   Substance and Sexual Activity     Alcohol use: Not Currently     Comment: rare     Drug use: No     Sexual activity: Yes     Partners: Male     Birth control/protection: Male Surgical     Comment: vasectomy            Family History:     Family History   Problem Relation Age of Onset     Osteoporosis Mother      Musculoskeletal Disorder Father         Paget's disease      Hyperlipidemia Father      Allergies Sister      Other - See Comments Sister         endometriosis     Cerebrovascular Disease Sister 47        stroke after recent ocp start for perimenopausal sx     Cancer Maternal Grandmother         lung     EYE* Maternal Grandfather         glaucoma     Cancer - colorectal Paternal Grandfather      Prostate Cancer Paternal Grandfather      Musculoskeletal Disorder Paternal Uncle         Paget's disease     Breast Cancer Paternal Cousin 55        Second cousin            Allergies:     Allergies   Allergen Reactions     Adhesive Tape Hives     Cat Hair Extract Hives     Dogs Hives     wheezing     Dust Mites      Other reaction(s): Wheezing  eye mucous     Erythromycin      Gluten Meal      Other reaction(s): GI intolerance     Grass Hives     eye mucous     Pollen Extract      Other reaction(s): Other (see comments)  Tree pollen-hives, eye mucous     Ragweeds Hives     eye mucous     Trees      Other reaction(s): Other (see comments)  Sinus issues     Wheat Bran Hives     Other reaction(s): GI intolerance            Medications:     Current Outpatient Medications   Medication Sig Dispense Refill     acetaminophen (TYLENOL) 650 MG CR tablet         BABY ASPIRIN PO        budesonide (RINOCORT AQUA) 32 MCG/ACT nasal spray Spray 1 spray into both nostrils daily       CLARITIN 10 MG OR TABS 1 TABLET DAILY       fluocinonide (LIDEX) 0.05 % external ointment        ketorolac tromethamine (ACULAR-LS) 0.4 % SOLN ophthalmic solution        ketotifen (ZADITOR/REFRESH ANTI-ITCH) 0.025 % SOLN ophthalmic solution 1 drop       Lidocaine (LIDOCARE) 4 % Patch        methylcellulose (CITRUCEL) 500 MG TABS tablet Take 100 mg by mouth daily       mupirocin (BACTROBAN) 2 % ointment PIPER A SMALL AMOUNT TO NOSTRILS ONCE OR BID  0     polyethylene glycol (MIRALAX) 17 GM/Dose powder Take 1 capful by mouth daily       Pseudoephedrine HCl (SUDAFED PO)        SODIUM CHLORIDE-SODIUM BICARB NA        tacrolimus (PROTOPIC) 0.1 % external ointment        UNABLE TO FIND MEDICATION NAME: Salt Stick Vitassium       vitamin B-12 (CYANOCOBALAMIN) 1000 MCG tablet        vitamin D3 (CHOLECALCIFEROL) 2000 units (50 mcg) tablet Take 1 tablet by mouth       folic acid (FOLVITE) 1 MG tablet Take 1 tablet (1 mg) by mouth daily (Patient not taking: Reported on 6/24/2022) 90 tablet 3     methotrexate 2.5 MG tablet 4 tab once a week and increase by 1 tab every week to reach at 6 tab once a week dose. (Patient not taking: Reported on 6/24/2022) 24 tablet 2     predniSONE (DELTASONE) 5 MG tablet 4tab=20 mg qd x 5 days, 3tab=15 mg qd x 5 days, 2tab=10 mg qd x 5 days, 7.5 mg x 5 days, 1 tab=5 mg qd (Patient not taking: Reported on 6/24/2022) 100 tablet 2            Physical Exam:   Vitals not taken.   Wt Readings from Last 4 Encounters:   06/24/22 103.9 kg (229 lb)   05/04/22 103.7 kg (228 lb 9.6 oz)   03/25/22 106 kg (233 lb 11.2 oz)   03/25/22 102.5 kg (225 lb 14.4 oz)       Constitutional: Obese, appearing stated age; cooperative  MS: No synovitis or tenderness present over MCP, PIP, DIP joints, bilateral wrists, elbows.  Normal range of motion of shoulders.  No knee effusions.  No tenderness present over  bilateral ankles, MTP joint.  She has multiple myofascial tender points, interscapular areas, knees, upper thighs etc.  Psych: nl judgement, orientation, memory, affect.         Data:     No results found for any visits on 06/24/22.    Recent Labs   Lab Test 09/03/21  1204 08/19/20  1141 07/26/19  1540 02/06/18  2034   WBC 9.8 9.6  --  12.7*   RBC 4.11 4.08  --  4.31   HGB 12.0 12.0  --  12.7   HCT 36.9 36.5  --  36.8   MCV 90 90  --  85   RDW 13.3 13.0  --  13.3    313  --  330   ALBUMIN 3.5 3.5 3.6 3.6   CRP  --  9.1*  --   --    BUN 8 10 6* 10      Recent Labs   Lab Test 09/03/21  1204 08/19/20  1141 09/20/17  1111   TSH 1.20 1.32 1.16   T4  --   --  2.66*     Hemoglobin   Date Value Ref Range Status   09/03/2021 12.0 11.7 - 15.7 g/dL Final   08/19/2020 12.0 11.7 - 15.7 g/dL Final   02/06/2018 12.7 11.7 - 15.7 g/dL Final   08/26/2016 12.7 11.7 - 15.7 g/dL Final     Urea Nitrogen   Date Value Ref Range Status   09/03/2021 8 7 - 30 mg/dL Final   08/19/2020 10 7 - 30 mg/dL Final   07/26/2019 6 (L) 7 - 30 mg/dL Final   02/06/2018 10 7 - 30 mg/dL Final     Sed Rate   Date Value Ref Range Status   08/19/2020 38 (H) 0 - 20 mm/h Final     Erythrocyte Sedimentation Rate   Date Value Ref Range Status   03/22/2022 27 (H) 0 - 20 mm/hr Final   02/25/2022 1 0 - 20 mm/hr Final   09/17/2021 41 (H) 0 - 20 mm/hr Final     CRP Inflammation   Date Value Ref Range Status   03/22/2022 3.6 0.0 - 8.0 mg/L Final   02/25/2022 11.9 (H) 0.0 - 8.0 mg/L Final   09/17/2021 10.9 (H) 0.0 - 8.0 mg/L Final   08/19/2020 9.1 (H) 0.0 - 8.0 mg/L Final     AST   Date Value Ref Range Status   09/03/2021 15 0 - 45 U/L Final   08/19/2020 11 0 - 45 U/L Final   07/26/2019 17 0 - 45 U/L Final   02/06/2018 15 0 - 45 U/L Final     Albumin   Date Value Ref Range Status   09/03/2021 3.5 3.4 - 5.0 g/dL Final   08/19/2020 3.5 3.4 - 5.0 g/dL Final   07/26/2019 3.6 3.4 - 5.0 g/dL Final   02/06/2018 3.6 3.4 - 5.0 g/dL Final     Alkaline Phosphatase   Date  Value Ref Range Status   09/03/2021 58 40 - 150 U/L Final   08/19/2020 51 40 - 150 U/L Final   07/26/2019 50 40 - 150 U/L Final   02/06/2018 54 40 - 150 U/L Final     ALT   Date Value Ref Range Status   09/03/2021 28 0 - 50 U/L Final   08/19/2020 24 0 - 50 U/L Final   07/26/2019 31 0 - 50 U/L Final   02/06/2018 26 0 - 50 U/L Final     Rheumatoid Factor   Date Value Ref Range Status   09/17/2021 <7 <20 IU/mL Final     Recent Labs   Lab Test 09/03/21  1204 08/19/20  1141 07/26/19  1540 02/06/18  2034 09/20/17  1111 08/26/16  0941   WBC 9.8 9.6  --  12.7*  --   --    HGB 12.0 12.0  --  12.7  --   --    HCT 36.9 36.5  --  36.8  --   --    MCV 90 90  --  85  --   --     313  --  330  --   --    BUN 8 10 6* 10  --    < >   TSH 1.20 1.32  --   --  1.16  --    AST 15 11 17 15  --   --    ALT 28 24 31 26  --    < >   ALKPHOS 58 51 50 54  --    < >    < > = values in this interval not displayed.       Outside studies reviewed: Records from [facility] rheumatology consultant and [place] [facility] reviewed    Reviewed Rheumatology lab flowsheet    Assessment     Polyarthralgia  Myofascial pain-fibromyalgia  History of right subclavian vein DVT-1999  Thoracic outlet syndrome status post right first rib resection  POTS, PCOS  Sicca symptoms  Elevated CRP, ESR   Neg RF, Anti CCP, ANCA, KARSTEN panel, dsDNA, Normal C3/C4    Polyarthralgia : She had arthralgia in her hands, feet, shoulders, lower back, neck area. Stiffness in her feet in the morning.  She was given prednisone taper and did notice improvement with prednisone taper. Her CRP normalized to 3.6. On physical exam though she had no synovitis or tenderness over her joints, no dactylitis, plantar fascitis or achillis tendinitis. MRI pelvis did not show sacroiliitis.  She is HLA-B27 negative. She was prescribed Methotrexate on her last visit for possible Spondyloarthropathy in the light of recurrent uveitis flares, elevated ESR, CRP and polyarthralgia. She has not started  it yet since she is getting allergy and GI work up done. I am not convinced of joint inflammation on exam today. I will hold off on starting Methotrexate. Will get her eye records to confirm if she is getting recurrent uveitis flares, as that could be indication for Methotrexate use as well.       Chronic low back pain: She reports chronic low back pain.  MRI pelvis done in 12/2021 did not show any evidence of sacroiliitis.    Recurrent facial flushing, diarrhea, urticaria symptoms : allergy referral placed.  Recurrent facial flushing, urticaria, diarrhea can be related to mast cell activation syndrome. She can discuss with allergist.     Plan      No inflammation noted on joint exam     Will get eye exam records to look for uveitis     Follow up in 3 months     Michelle Arias MD  [facility]  Department of Rheumatology & Autoimmune Disorders  [facility]: [phone]  Pager - [phone]     TT 30 min was spent on date of the encounter doing chart review, history and exam, documentation and further activities as noted above. Any prior notes, outside records, laboratory results, and imaging studies were reviewed if relevant.    Patient verbalized agreement with and understanding of the rationale for the diagnosis and treatment plan.  All questions were answered to best of my ability and the patient's satisfaction.      Chart documentation done in part with Dragon Voice recognition Software. Although reviewed after completion, some word and grammatical error may remain.

## 2022-06-24 NOTE — PATIENT INSTRUCTIONS
Follow up in 3 months     No inflammation noted on joint exam     Will get eye exam records to look for uveitis

## 2022-06-29 ENCOUNTER — TRANSFERRED RECORDS (OUTPATIENT)
Dept: HEALTH INFORMATION MANAGEMENT | Facility: CLINIC | Age: 47
End: 2022-06-29

## 2022-07-15 ENCOUNTER — HOSPITAL ENCOUNTER (OUTPATIENT)
Dept: OCCUPATIONAL THERAPY | Facility: CLINIC | Age: 47
Discharge: HOME | End: 2022-07-15
Payer: COMMERCIAL

## 2022-07-15 DIAGNOSIS — I87.1 VENOUS THORACIC OUTLET SYNDROME OF RIGHT SUBCLAVIAN VEIN: ICD-10-CM

## 2022-07-15 DIAGNOSIS — I89.0 LYMPHEDEMA: ICD-10-CM

## 2022-07-15 DIAGNOSIS — I89.0 LYMPHEDEMA OF ARM: Primary | ICD-10-CM

## 2022-07-15 DIAGNOSIS — Z86.718 HISTORY OF DVT (DEEP VEIN THROMBOSIS): ICD-10-CM

## 2022-07-15 PROCEDURE — 97140 MANUAL THERAPY 1/> REGIONS: CPT | Mod: GO

## 2022-08-11 NOTE — PROGRESS NOTES
Damaris is a 47 year old  female who presents for annual exam.     Besides routine health maintenance, she has no other health concerns today.    HPI:  The patient's PCP is none.      Patient is being seen today for an annual exam. Reports that she is still struggling w/ GI issues and has alternating constipation and diarrhea with bloating and stomach pain. Had a C.s done recently that was normal and recommended routine 10 yr f/up.  Says that she was rx'd w/ amitriptyline for this and it has helped slightly.  Continues to have GI work up and is going in later today for breath testing for fructose and some other things and then working on elimination diet to see if anything is found. At this point doesn't appear to be celiac despite other unexplained autoimmune conditions.     Has known spondylolisthesis L4/L5 with back pain and hip pain. she says that pain is doing better overall after had an epidural-cortisone shot recently which has given her relief from stiffness. Still working on a definitive dx as nothing really is fitting her joint pains but currently just using working dx of fibromyalgia.     Sister has many similar issues to her though slightly different variations of same issues, including heavy and irregular periods but also a lot of dysmenorrhea this latter issue has not been as big of an issue for damaris and more just heavy and irregular periods for years and now regular and heavy, though less so than prior years. Sister was dx'd with endo, and did her 2nd lap where they excised adhesions and implants But did not have hysterectomy. Patient wondering if some of her GI issues could be endo as well for her as she's never had a L/S to eval this.    patient believes her back pain and GI issues are chronically there but def worsen with her cycle     Says that her periods are nml and regular with 3-4 days of bleeding every 26-30 days, but did bleed for 7 days when had her covid vax  went back to nml after a  month or so.  Says that uses thinx underwear for her periods, and notices that is rinsing out the underwear more but hard to quantify given their the underwear and not pad or tampon. Washing and changing maybe 4x/day though so not excessive. The bleeding is heavy for two days, then light where it spots for a few days but has no issues and manageable b/c at least now very regular in contrast to years of irregular. Worries about anything hormonal b/c had a subclavian clot and the occlusion is still there on imaging. Occurred d/t rib compression and anatomic rather than hormones but her sister also had a DVT while on ocps so really doesn't think she'd want to consider hormonal options for periods or for endo dx purposes.    Fasting for labs today  mammo today    GYNECOLOGIC HISTORY:    Patient's last menstrual period was 08/09/2022.    Regular menses? yes  Menses every 28 days.  Length of menses: 4-7 days    Her current contraception method is: vasectomy and condoms.  She  reports that she has never smoked. She has never used smokeless tobacco.    Patient is sexually active.  STD testing offered?  Declined     Last PHQ-9 score on record =   PHQ-9 SCORE 8/15/2022   PHQ-9 Total Score 0     Last GAD7 score on record =   KAL-7 SCORE 8/15/2022   Total Score 0     Alcohol Score = 1    HEALTH MAINTENANCE:  Cholesterol:   Recent Labs   Lab Test 09/03/21  1204 08/19/20  1141 12/10/14  1033   CHOL 233* 200* 197   HDL 70 71 62   * 107* 122   TRIG 128 109 63   CHOLHDLRATIO  --   --  3.2    < > = values in this interval not displayed.     TSH   Date Value Ref Range Status   08/15/2022 1.15 0.40 - 4.00 mU/L Final   08/19/2020 1.32 0.40 - 4.00 mU/L Final     Last Mammo: 09/03/21, Result: Normal, Next Mammo: Today   Pap:   Lab Results   Component Value Date    PAP NIL NEG-HPV 08/19/2020    PAP NIL 12/16/2015    PAP NIL 01/21/2009      Colonoscopy: 06/14/21, Result: Normal, Next Colonoscopy: 2031  Dexa:  N/A    Health  maintenance updated:  yes    HISTORY:  OB History    Para Term  AB Living   0 0 0 0 0 0   SAB IAB Ectopic Multiple Live Births   0 0 0 0 0       Patient Active Problem List   Diagnosis     GERD (gastroesophageal reflux disease)     Antiphospholipid antibody syndrome (H)     Venous thoracic outlet syndrome of right subclavian vein     History of DVT (deep vein thrombosis)     Cystic acne     Menorrhagia with regular cycle     PCOS (polycystic ovarian syndrome)     POTS (postural orthostatic tachycardia syndrome)     Morbid (severe) obesity due to excess calories (H)     Spondylolisthesis at L4-L5 level     Arthralgia, unspecified joint     Dysmenorrhea     Alternating constipation and diarrhea     Thoracic outlet syndrome associated with cervical rib     Past Surgical History:   Procedure Laterality Date     ENDOSCOPIC SINUS SURGERY  2014    Procedure: ENDOSCOPIC SINUS SURGERY;  Surgeon: Suzi Vieyra MD;  Location: [facility]     EP STUDY TILT TABLE N/A 2021    Procedure: EP TILT TABLE;  Surgeon: Ilya Gilmore MD;  Location:  [facility]     ORTHOPEDIC SURGERY       TURBINECTOMY  2014    Procedure: TURBINECTOMY;  Surgeon: Suzi Vieyra MD;  Location: [facility]     ZZ NONSPECIFIC PROCEDURE  2000    resection of first rib     ZZC NONSPECIFIC PROCEDURE      left foot surgery      Social History     Tobacco Use     Smoking status: Never Smoker     Smokeless tobacco: Never Used   Substance Use Topics     Alcohol use: Not Currently     Comment: rare      Problem (# of Occurrences) Relation (Name,Age of Onset)    Allergies (1) Sister    Breast Cancer (1) Paternal Cousin (55): Second cousin    Cancer (1) Maternal Grandmother: lung    Cancer - colorectal (1) Paternal Grandfather    Cerebrovascular Disease (2) Sister (47): stroke after recent ocp start for perimenopausal sx, Paternal Cousin (44)    EYE* (1) Maternal Grandfather: glaucoma    Hyperlipidemia (1) Father     Musculoskeletal Disorder (2) Father: Paget's disease , Paternal Uncle: Paget's disease    Osteoporosis (1) Mother    Other - See Comments (1) Sister: endometriosis    Prostate Cancer (1) Paternal Grandfather            Current Outpatient Medications   Medication Sig     acetaminophen (TYLENOL) 650 MG CR tablet      BABY ASPIRIN PO      budesonide (RINOCORT AQUA) 32 MCG/ACT nasal spray Spray 1 spray into both nostrils daily     CLARITIN 10 MG OR TABS 1 TABLET DAILY     fluocinonide (LIDEX) 0.05 % external ointment      ketorolac tromethamine (ACULAR-LS) 0.4 % SOLN ophthalmic solution      ketotifen (ZADITOR/REFRESH ANTI-ITCH) 0.025 % SOLN ophthalmic solution 1 drop     LANsoprazole (PREVACID) 15 MG DR capsule      Lidocaine (LIDOCARE) 4 % Patch      methylcellulose (CITRUCEL) 500 MG TABS tablet Take 100 mg by mouth daily     mupirocin (BACTROBAN) 2 % ointment PIPER A SMALL AMOUNT TO NOSTRILS ONCE OR BID     polyethylene glycol (MIRALAX) 17 GM/Dose powder Take 1 capful by mouth daily     pseudoePHEDrine (SUDAFED) 120 MG 12 hr tablet Take by mouth every 24 hours     SODIUM CHLORIDE-SODIUM BICARB NA      tacrolimus (PROTOPIC) 0.1 % external ointment      UNABLE TO FIND MEDICATION NAME: Salt Stick Vitassium     vitamin B-12 (CYANOCOBALAMIN) 1000 MCG tablet      vitamin D3 (CHOLECALCIFEROL) 2000 units (50 mcg) tablet Take 1 tablet by mouth     No current facility-administered medications for this visit.     Allergies   Allergen Reactions     Adhesive Tape Hives     Cat Hair Extract Hives     Dogs Hives     wheezing     Dust Mites      Other reaction(s): Wheezing  eye mucous     Erythromycin      Gluten Meal      Other reaction(s): GI intolerance     Grass Hives     eye mucous     Oxycodone      Other reaction(s): Nausea, cramps, dizzyness     Pollen Extract      Other reaction(s): Other (see comments)  Tree pollen-hives, eye mucous     Ragweeds Hives     eye mucous     Trees      Other reaction(s): Other (see  "comments)  Sinus issues     Wheat Bran Hives     Other reaction(s): GI intolerance       Past medical, surgical, social and family histories were reviewed and updated in EPIC.    ROS:   12 point review of systems negative other than symptoms noted below or in the HPI.  No urinary frequency or dysuria, bladder or kidney problems    EXAM:  /70   Ht 1.6 m (5' 3\")   Wt 101.6 kg (224 lb)   LMP 08/09/2022   BMI 39.68 kg/m     BMI: Body mass index is 39.68 kg/m .    PHYSICAL EXAM:  Constitutional:   Appearance: Well nourished, well developed, alert, in no acute distress  Neck:  Lymph Nodes:  No lymphadenopathy present    Thyroid:  Gland size normal, nontender, no nodules or masses present  on palpation  Chest:  Respiratory Effort:  Breathing unlabored, CTAB  Cardiovascular:    Heart: Auscultation:  Regular rate, normal rhythm, no murmurs present  Breasts: Inspection of Breasts:  No lymphadenopathy present., Palpation of Breasts and Axillae:  No masses present on palpation, no breast tenderness., Axillary Lymph Nodes:  No lymphadenopathy present. and No nodularity, asymmetry or nipple discharge bilaterally.  Gastrointestinal:   Abdominal Examination:  Abdomen nontender to palpation, tone normal without rigidity or guarding, no masses present, umbilicus without lesions   Liver and Spleen:  No hepatomegaly present, liver nontender to palpation    Hernias:  No hernias present  Lymphatic: Lymph Nodes:  No other lymphadenopathy present  Skin:  General Inspection:  No rashes present, no lesions present, no areas of  discoloration  Neurologic:    Mental Status:  Oriented X3.  Normal strength and tone, sensory exam                grossly normal, mentation intact and speech normal.    Psychiatric:   Mentation appears normal and affect normal/bright.         Pelvic Exam:  External Genitalia:     Normal appearance for age, no discharge present, no tenderness present, no inflammatory lesions present, color normal  Vagina:  "    Normal vaginal vault without central or paravaginal defects, no discharge present, no inflammatory lesions present, no masses present  Bladder:     Nontender to palpation  Urethra:   Urethral Body:  Urethra palpation normal, urethra structural support normal   Urethral Meatus:  No erythema or lesions present  Cervix:     Appearance healthy, no lesions present, nontender to palpation, no bleeding present  Uterus:     Uterus: firm, normal sized and nontender, anteverted in position.   Adnexa:     No adnexal tenderness present, no adnexal masses present  Perineum:     Perineum within normal limits, no evidence of trauma, no rashes or skin lesions present  Anus:     Anus within normal limits, no hemorrhoids present  Inguinal Lymph Nodes:     No lymphadenopathy present  Pubic Hair:     Normal pubic hair distribution for age  Genitalia and Groin:     No rashes present, no lesions present, no areas of discoloration, no masses present      COUNSELING:   Reviewed preventive health counseling, as reflected in patient instructions    BMI: Body mass index is 39.68 kg/m .  Weight management plan: Discussed healthy diet and exercise guidelines    ASSESSMENT:  47 year old female with satisfactory annual exam.    ICD-10-CM    1. Encounter for gynecological examination without abnormal finding  Z01.419    2. Menorrhagia with regular cycle  N92.0 CBC with platelets     Ferritin     CBC with platelets     Ferritin   3. Vitamin B 12 deficiency  E53.8 Vitamin B12     Vitamin B12   4. Fibromyalgia  M79.7    5. POTS (postural orthostatic tachycardia syndrome)  I49.8    6. Spondylolisthesis at L4-L5 level  M43.16    7. Thoracic outlet syndrome associated with cervical rib  G54.0     Q76.0    8. Morbid (severe) obesity due to excess calories (H)  E66.01    9. Dysmenorrhea  N94.6    10. Alternating constipation and diarrhea  R19.8    11. Encounter for lipid screening for cardiovascular disease  Z13.220 Lipid panel reflex to direct LDL  Fasting    Z13.6 Lipid panel reflex to direct LDL Fasting   12. Screening for metabolic disorder  Z13.228 Comprehensive metabolic panel     Comprehensive metabolic panel   13. Screening for thyroid disorder  Z13.29 TSH with free T4 reflex     TSH with free T4 reflex   14. Screening for diabetes mellitus  Z13.1 Hemoglobin A1c     Hemoglobin A1c   15. Encounter for vitamin deficiency screening  Z13.21 Vitamin D Deficiency     Vitamin D Deficiency   16. History of DVT (deep vein thrombosis)  Z86.718        PLAN:  Pap UTD for 3 more yrs unless ends up doing chronic immune suppression vs just occasional/episodic steroids  Mammo done today.   Fasting labs done today.   No refills on meds for the yr b/c patient's PCP has already done them.     Discussed endo, GI issues and its relation to fibromyalgia, endometriosis, etc  Could very well have endo along with these other issues not just b/c related to them in some way but just frankly as it's own dx  However many of her conditions could very well explain her sx w/o an endo dx. Does have worsening GI and back pain sx with menses so could be connected    Could do L/S to eval but this carries it's own risks and though can help to debulk and improve sx in the short term may not work or work for long term.  Discussed hormonal options like ocps, N.R, mirena IUD, lupron. All but the latter two would be relatively contraindicated given her h.o DVT. Regardless that it was due to thoracic rib the rib is still present and recent dopplers 6/22 show bilat subclavian compression with arm elevation to 90 and 180 degrees, as well as fhx of DVT in sister along with weight/age/etc.    mirena would help her heavy flow and likely some improvement in cramping with that alone and wouldn't be contraindicated, though discomfort with nullip placement  lupron typically done with add back therapy so the latter with some increased VTE risk though less so than E2 containing options  Could be done for 1  yr as indicated for treatment or could be done for 3-6 months as test for endo, though of course with limitations, and somewhat to suppress some degree of sx     Went over methotrexate and some of the pros/cons/se as that is what is currently being considered by rheum for her fibro. Will discuss that further with rheum and in the meantime will consider possibility of mirena IUD insertion in the future.      On the same date of the encounter, an additional 20 minutes on top of her preventative annual gyn exam, were spent on chart review and completion including: imaging, lab work, previous visit notes by this provider,  and other provider notes, along with direct management of the patient's medical issues as above.      Abby Mendoza MD

## 2022-08-15 ENCOUNTER — OFFICE VISIT (OUTPATIENT)
Dept: OBGYN | Facility: CLINIC | Age: 47
End: 2022-08-15
Attending: OBSTETRICS & GYNECOLOGY
Payer: COMMERCIAL

## 2022-08-15 ENCOUNTER — ANCILLARY PROCEDURE (OUTPATIENT)
Dept: MAMMOGRAPHY | Facility: CLINIC | Age: 47
End: 2022-08-15
Attending: OBSTETRICS & GYNECOLOGY
Payer: COMMERCIAL

## 2022-08-15 VITALS
BODY MASS INDEX: 39.69 KG/M2 | WEIGHT: 224 LBS | HEIGHT: 63 IN | DIASTOLIC BLOOD PRESSURE: 70 MMHG | SYSTOLIC BLOOD PRESSURE: 128 MMHG

## 2022-08-15 DIAGNOSIS — M79.7 FIBROMYALGIA: ICD-10-CM

## 2022-08-15 DIAGNOSIS — M43.16 SPONDYLOLISTHESIS AT L4-L5 LEVEL: ICD-10-CM

## 2022-08-15 DIAGNOSIS — Z12.31 VISIT FOR SCREENING MAMMOGRAM: ICD-10-CM

## 2022-08-15 DIAGNOSIS — R19.8 ALTERNATING CONSTIPATION AND DIARRHEA: ICD-10-CM

## 2022-08-15 DIAGNOSIS — Z13.6 ENCOUNTER FOR LIPID SCREENING FOR CARDIOVASCULAR DISEASE: ICD-10-CM

## 2022-08-15 DIAGNOSIS — Z13.21 ENCOUNTER FOR VITAMIN DEFICIENCY SCREENING: ICD-10-CM

## 2022-08-15 DIAGNOSIS — E66.01 MORBID (SEVERE) OBESITY DUE TO EXCESS CALORIES (H): ICD-10-CM

## 2022-08-15 DIAGNOSIS — Z13.220 ENCOUNTER FOR LIPID SCREENING FOR CARDIOVASCULAR DISEASE: ICD-10-CM

## 2022-08-15 DIAGNOSIS — Z13.1 SCREENING FOR DIABETES MELLITUS: ICD-10-CM

## 2022-08-15 DIAGNOSIS — Z13.29 SCREENING FOR THYROID DISORDER: ICD-10-CM

## 2022-08-15 DIAGNOSIS — G90.A POTS (POSTURAL ORTHOSTATIC TACHYCARDIA SYNDROME): ICD-10-CM

## 2022-08-15 DIAGNOSIS — E53.8 VITAMIN B 12 DEFICIENCY: ICD-10-CM

## 2022-08-15 DIAGNOSIS — G54.0 THORACIC OUTLET SYNDROME ASSOCIATED WITH CERVICAL RIB: ICD-10-CM

## 2022-08-15 DIAGNOSIS — Z01.419 ENCOUNTER FOR GYNECOLOGICAL EXAMINATION WITHOUT ABNORMAL FINDING: Primary | ICD-10-CM

## 2022-08-15 DIAGNOSIS — Z13.228 SCREENING FOR METABOLIC DISORDER: ICD-10-CM

## 2022-08-15 DIAGNOSIS — N94.6 DYSMENORRHEA: ICD-10-CM

## 2022-08-15 DIAGNOSIS — Q76.5 THORACIC OUTLET SYNDROME ASSOCIATED WITH CERVICAL RIB: ICD-10-CM

## 2022-08-15 DIAGNOSIS — N92.0 MENORRHAGIA WITH REGULAR CYCLE: ICD-10-CM

## 2022-08-15 DIAGNOSIS — Z86.718 HISTORY OF DVT (DEEP VEIN THROMBOSIS): ICD-10-CM

## 2022-08-15 LAB
ERYTHROCYTE [DISTWIDTH] IN BLOOD BY AUTOMATED COUNT: 14.1 % (ref 10–15)
HBA1C MFR BLD: 5.4 % (ref 0–5.6)
HCT VFR BLD AUTO: 36.6 % (ref 35–47)
HGB BLD-MCNC: 11.6 G/DL (ref 11.7–15.7)
MCH RBC QN AUTO: 27.6 PG (ref 26.5–33)
MCHC RBC AUTO-ENTMCNC: 31.7 G/DL (ref 31.5–36.5)
MCV RBC AUTO: 87 FL (ref 78–100)
PLATELET # BLD AUTO: 416 10E3/UL (ref 150–450)
RBC # BLD AUTO: 4.2 10E6/UL (ref 3.8–5.2)
VIT B12 SERPL-MCNC: 604 PG/ML (ref 232–1245)
WBC # BLD AUTO: 8.9 10E3/UL (ref 4–11)

## 2022-08-15 PROCEDURE — 80053 COMPREHEN METABOLIC PANEL: CPT | Performed by: OBSTETRICS & GYNECOLOGY

## 2022-08-15 PROCEDURE — 83036 HEMOGLOBIN GLYCOSYLATED A1C: CPT | Performed by: OBSTETRICS & GYNECOLOGY

## 2022-08-15 PROCEDURE — 99396 PREV VISIT EST AGE 40-64: CPT | Performed by: OBSTETRICS & GYNECOLOGY

## 2022-08-15 PROCEDURE — 82607 VITAMIN B-12: CPT | Performed by: OBSTETRICS & GYNECOLOGY

## 2022-08-15 PROCEDURE — 85027 COMPLETE CBC AUTOMATED: CPT | Performed by: OBSTETRICS & GYNECOLOGY

## 2022-08-15 PROCEDURE — 77067 SCR MAMMO BI INCL CAD: CPT | Mod: TC | Performed by: RADIOLOGY

## 2022-08-15 PROCEDURE — 82728 ASSAY OF FERRITIN: CPT | Performed by: OBSTETRICS & GYNECOLOGY

## 2022-08-15 PROCEDURE — 82306 VITAMIN D 25 HYDROXY: CPT | Performed by: OBSTETRICS & GYNECOLOGY

## 2022-08-15 PROCEDURE — 84443 ASSAY THYROID STIM HORMONE: CPT | Performed by: OBSTETRICS & GYNECOLOGY

## 2022-08-15 PROCEDURE — 99213 OFFICE O/P EST LOW 20 MIN: CPT | Mod: 25 | Performed by: OBSTETRICS & GYNECOLOGY

## 2022-08-15 PROCEDURE — 77063 BREAST TOMOSYNTHESIS BI: CPT | Mod: TC | Performed by: RADIOLOGY

## 2022-08-15 PROCEDURE — 80061 LIPID PANEL: CPT | Performed by: OBSTETRICS & GYNECOLOGY

## 2022-08-15 PROCEDURE — 36415 COLL VENOUS BLD VENIPUNCTURE: CPT | Performed by: OBSTETRICS & GYNECOLOGY

## 2022-08-15 RX ORDER — MECOBALAMIN 5000 MCG
TABLET,DISINTEGRATING ORAL
COMMUNITY
Start: 2022-03-31

## 2022-08-15 RX ORDER — PSEUDOEPHEDRINE HCL 120 MG/1
TABLET, FILM COATED, EXTENDED RELEASE ORAL EVERY 24 HOURS
COMMUNITY
Start: 2021-06-14

## 2022-08-15 ASSESSMENT — PATIENT HEALTH QUESTIONNAIRE - PHQ9
5. POOR APPETITE OR OVEREATING: NOT AT ALL
SUM OF ALL RESPONSES TO PHQ QUESTIONS 1-9: 0

## 2022-08-15 ASSESSMENT — ANXIETY QUESTIONNAIRES
GAD7 TOTAL SCORE: 0
3. WORRYING TOO MUCH ABOUT DIFFERENT THINGS: NOT AT ALL
6. BECOMING EASILY ANNOYED OR IRRITABLE: NOT AT ALL
2. NOT BEING ABLE TO STOP OR CONTROL WORRYING: NOT AT ALL
7. FEELING AFRAID AS IF SOMETHING AWFUL MIGHT HAPPEN: NOT AT ALL
GAD7 TOTAL SCORE: 0
1. FEELING NERVOUS, ANXIOUS, OR ON EDGE: NOT AT ALL
5. BEING SO RESTLESS THAT IT IS HARD TO SIT STILL: NOT AT ALL

## 2022-08-16 LAB
ALBUMIN SERPL-MCNC: 3.3 G/DL (ref 3.4–5)
ALP SERPL-CCNC: 64 U/L (ref 40–150)
ALT SERPL W P-5'-P-CCNC: 23 U/L (ref 0–50)
ANION GAP SERPL CALCULATED.3IONS-SCNC: 7 MMOL/L (ref 3–14)
AST SERPL W P-5'-P-CCNC: 14 U/L (ref 0–45)
BILIRUB SERPL-MCNC: 0.3 MG/DL (ref 0.2–1.3)
BUN SERPL-MCNC: 7 MG/DL (ref 7–30)
CALCIUM SERPL-MCNC: 9.1 MG/DL (ref 8.5–10.1)
CHLORIDE BLD-SCNC: 105 MMOL/L (ref 94–109)
CHOLEST SERPL-MCNC: 208 MG/DL
CO2 SERPL-SCNC: 26 MMOL/L (ref 20–32)
CREAT SERPL-MCNC: 0.56 MG/DL (ref 0.52–1.04)
DEPRECATED CALCIDIOL+CALCIFEROL SERPL-MC: 53 UG/L (ref 20–75)
FASTING STATUS PATIENT QL REPORTED: YES
FERRITIN SERPL-MCNC: 37 NG/ML (ref 8–252)
GFR SERPL CREATININE-BSD FRML MDRD: >90 ML/MIN/1.73M2
GLUCOSE BLD-MCNC: 87 MG/DL (ref 70–99)
HDLC SERPL-MCNC: 63 MG/DL
LDLC SERPL CALC-MCNC: 120 MG/DL
NONHDLC SERPL-MCNC: 145 MG/DL
POTASSIUM BLD-SCNC: 3.5 MMOL/L (ref 3.4–5.3)
PROT SERPL-MCNC: 7.1 G/DL (ref 6.8–8.8)
SODIUM SERPL-SCNC: 138 MMOL/L (ref 133–144)
TRIGL SERPL-MCNC: 123 MG/DL
TSH SERPL DL<=0.005 MIU/L-ACNC: 1.15 MU/L (ref 0.4–4)

## 2022-08-16 NOTE — RESULT ENCOUNTER NOTE
Damaris,    All of your labs look essentially great.    I'm not worried about the very slightly low albumin, that's not a huge deal.    Your hemoglobin is very slightly low at 11.6 rather than normal of 11.7 and I'm sure that your periods being a bit heavier/longer is the cause of this. However it's very slightly low so not anything concerning.    Your LDL or bad cholesterol is actually fine at 120 as <130 is considered normal.    So all in all, everything looks good.    Abby Mendoza MD

## 2022-08-19 ENCOUNTER — HOSPITAL ENCOUNTER (OUTPATIENT)
Dept: OCCUPATIONAL THERAPY | Facility: CLINIC | Age: 47
Discharge: HOME | End: 2022-08-19
Payer: COMMERCIAL

## 2022-08-19 DIAGNOSIS — I87.1 VENOUS THORACIC OUTLET SYNDROME OF RIGHT SUBCLAVIAN VEIN: ICD-10-CM

## 2022-08-19 DIAGNOSIS — Z86.718 HISTORY OF DVT (DEEP VEIN THROMBOSIS): ICD-10-CM

## 2022-08-19 DIAGNOSIS — I89.0 LYMPHEDEMA OF ARM: Primary | ICD-10-CM

## 2022-08-19 DIAGNOSIS — I89.0 LYMPHEDEMA: ICD-10-CM

## 2022-08-19 PROCEDURE — 97140 MANUAL THERAPY 1/> REGIONS: CPT | Mod: GO

## 2022-08-20 PROBLEM — R19.8 ALTERNATING CONSTIPATION AND DIARRHEA: Status: ACTIVE | Noted: 2022-08-20

## 2022-08-20 PROBLEM — K58.0 IRRITABLE BOWEL SYNDROME WITH DIARRHEA: Status: RESOLVED | Noted: 2021-09-03 | Resolved: 2022-08-20

## 2022-08-20 PROBLEM — E66.01 MORBID (SEVERE) OBESITY DUE TO EXCESS CALORIES (H): Status: ACTIVE | Noted: 2021-09-03

## 2022-08-20 PROBLEM — Q76.5 THORACIC OUTLET SYNDROME ASSOCIATED WITH CERVICAL RIB: Status: ACTIVE | Noted: 2022-08-20

## 2022-08-20 PROBLEM — M25.50 ARTHRALGIA, UNSPECIFIED JOINT: Status: ACTIVE | Noted: 2022-08-20

## 2022-08-20 PROBLEM — G54.0 THORACIC OUTLET SYNDROME ASSOCIATED WITH CERVICAL RIB: Status: ACTIVE | Noted: 2022-08-20

## 2022-08-20 PROBLEM — N94.6 DYSMENORRHEA: Status: ACTIVE | Noted: 2022-08-20

## 2022-08-22 ENCOUNTER — TRANSFERRED RECORDS (OUTPATIENT)
Dept: HEALTH INFORMATION MANAGEMENT | Facility: CLINIC | Age: 47
End: 2022-08-22

## 2022-09-16 ENCOUNTER — HOSPITAL ENCOUNTER (OUTPATIENT)
Dept: OCCUPATIONAL THERAPY | Facility: CLINIC | Age: 47
Discharge: HOME | End: 2022-09-16
Payer: COMMERCIAL

## 2022-09-16 DIAGNOSIS — I89.0 LYMPHEDEMA: ICD-10-CM

## 2022-09-16 DIAGNOSIS — I87.1 VENOUS THORACIC OUTLET SYNDROME OF RIGHT SUBCLAVIAN VEIN: ICD-10-CM

## 2022-09-16 DIAGNOSIS — I89.0 LYMPHEDEMA OF ARM: Primary | ICD-10-CM

## 2022-09-16 DIAGNOSIS — Z86.718 HISTORY OF DVT (DEEP VEIN THROMBOSIS): ICD-10-CM

## 2022-09-16 PROCEDURE — 97140 MANUAL THERAPY 1/> REGIONS: CPT | Mod: GO

## 2022-10-03 ENCOUNTER — TRANSFERRED RECORDS (OUTPATIENT)
Dept: HEALTH INFORMATION MANAGEMENT | Facility: CLINIC | Age: 47
End: 2022-10-03

## 2022-10-06 ENCOUNTER — TRANSFERRED RECORDS (OUTPATIENT)
Dept: HEALTH INFORMATION MANAGEMENT | Facility: CLINIC | Age: 47
End: 2022-10-06

## 2022-10-13 ENCOUNTER — OFFICE VISIT (OUTPATIENT)
Dept: RHEUMATOLOGY | Facility: CLINIC | Age: 47
End: 2022-10-13
Payer: COMMERCIAL

## 2022-10-13 VITALS
HEIGHT: 63 IN | WEIGHT: 230.5 LBS | HEART RATE: 98 BPM | DIASTOLIC BLOOD PRESSURE: 85 MMHG | OXYGEN SATURATION: 100 % | SYSTOLIC BLOOD PRESSURE: 143 MMHG | BODY MASS INDEX: 40.84 KG/M2

## 2022-10-13 DIAGNOSIS — M25.50 ARTHRALGIA, UNSPECIFIED JOINT: Primary | ICD-10-CM

## 2022-10-13 PROCEDURE — 99214 OFFICE O/P EST MOD 30 MIN: CPT | Performed by: STUDENT IN AN ORGANIZED HEALTH CARE EDUCATION/TRAINING PROGRAM

## 2022-10-13 RX ORDER — LEVOCETIRIZINE DIHYDROCHLORIDE 5 MG/1
5 TABLET, FILM COATED ORAL EVERY EVENING
COMMUNITY

## 2022-10-13 ASSESSMENT — PAIN SCALES - GENERAL: PAINLEVEL: MILD PAIN (2)

## 2022-10-13 NOTE — NURSING NOTE
"Chief Complaint   Patient presents with     RECHECK     Elevated sed rate +2 more       Vitals:    10/13/22 1407   BP: (!) 143/85   BP Location: Left arm   Patient Position: Sitting   Cuff Size: Adult Large   Pulse: 98   SpO2: 100%   Weight: 104.6 kg (230 lb 8 oz)   Height: 1.6 m (5' 3\")       Body mass index is 40.83 kg/m .    Blanca Haley, ICVF    "

## 2022-10-13 NOTE — PROGRESS NOTES
Rheumatology clinic visit note          Active Problem List:     Patient Active Problem List    Diagnosis Date Noted     Arthralgia, unspecified joint 08/20/2022     Priority: Medium     Dysmenorrhea 08/20/2022     Priority: Medium     Alternating constipation and diarrhea 08/20/2022     Priority: Medium     Thoracic outlet syndrome associated with cervical rib 08/20/2022     Priority: Medium     Morbid (severe) obesity due to excess calories (H) 09/03/2021     Priority: Medium     Spondylolisthesis at L4-L5 level 09/03/2021     Priority: Medium     POTS (postural orthostatic tachycardia syndrome) 07/19/2021     Priority: Medium     Added automatically from request for surgery [id number]       Venous thoracic outlet syndrome of right subclavian vein 07/26/2019     Priority: Medium     History of DVT (deep vein thrombosis) 07/26/2019     Priority: Medium     Cystic acne 07/26/2019     Priority: Medium     Menorrhagia with regular cycle 07/26/2019     Priority: Medium     PCOS (polycystic ovarian syndrome) 07/26/2019     Priority: Medium     Antiphospholipid antibody syndrome (H) 08/26/2016     Priority: Medium     GERD (gastroesophageal reflux disease) 10/04/2012     Priority: Medium            History of Present Illness:   Damaris Sarmiento is a 47 year old female with PMH of morbid obesity, PCOS, history of DVT, IBS, POTS, + IgM cardiolipin, GERD evaluated in clinic for follow up evaluation of joint pains.     History from initial visit :  She reports chronic pain in her joints mainly lower back, hip, shoulder, neck.  Other area which is bothersome is her feet which are worse in the morning.  Her ankles feel very stiff in the morning and lock up.  She takes Tylenol 2 tablets twice a day which helps some.  In the morning her fingers are also puffy.  She follows up with orthopedic at [facility] for lower back and hip pain.    She also has chronic dry mouth and dry eyes.  She reports history of uveitis in her  eyes which has happened on and off since high school years.    She gets skin lesions and has been evaluated by dermatology.  Skin biopsy did not show any evidence of cutaneous lupus.  She has known allergies and  gets on and off sinusitis and uses steroids.  She also see her PCP for POTS disease.  She takes sodium, potassium pills which helps.  In the morning her heart rate is fast and she get dizzy.     She gets pain under her left breast area. No SOB. Denies any malar rash, recurrent mouth/genital ulcers, pleuritic chest pains, recurrent sinusitis/rhinitis, swallowing difficulty, hearing or visual changes recently. She has noticed bluish purple discoloration of her fingertips and has sensitivity to sun.     She has seen Dr. Guerin at [facility].  Her first visit was in 12/2018 when she presented with generalized pain, worse in lower back, legs, feet associated with sed rate of 48, CRP 0.7.  In addition she had IBS symptoms, chronic sinus congestion, fatigue.  Autoimmune work-up was unremarkable except for elevated markers of inflammation, low vitamin D level.  No diagnosis of autoimmune connective tissue disease was made at that time. Symptoms were considered related to fibromyalgia, possible early osteoarthritis and PCOS related musculoskeletal symptoms.  Her last visit with Dr. Guerin was in 5/2020, no palpable synovitis was noted in her joints.  She had 12/18 typical fibromyalgia tender points.  She was recommended to follow-up with PCP for fibromyalgia, PCOS and POTS symptoms.    She also has history of prior DVT of the right subclavian vein in 1999 and 2000 with thoracic outlet syndrome status post resection of first right rib.  She received anticoagulation which was discontinued in 2000.  She has seen Dr. Brian Mao at [facility].  Her anticoagulation work-up showed normal von Willebrand profile, positive IgM cardiolipin antibody of 39, negative beta-2  glycoprotein and lupus anticoagulant.  She was not recommended to take lifelong anticoagulation since her right subclavian vein thrombosis was considered related to thoracic outlet syndrome and was treated surgically by first rib resection.     February 24, 2022 - She had another MRI of Lumbar spine throught [facility] and had steroid injection 3 - 4 weeks ago. SHe is doing PT as well. If she does not take Tylenol arthritis she can not work. Her hands are swollen, sometimes her feet are cramped in the morning. She has chronic constipation.  Colonoscopy done in 6/2021 was normal. If she does not take Miralax she gets constipated. She gets hives, facial lesions. In summer she had hives for 3 weeks. Last uveitis flare was 1 st week of January. Facial flushing happen every day. She take Claritin. She has not seen Allergist.  She has pain in her feet on the medial /interior side. She has stiffness in her hands. wrists francis lock up. She has hip pain all the time. She has POTS.  When she eat she feel it gets stuck.     March 25, 2022 - She has less swelling in her eyes for the last month on prednisone. She use Ketolorac as needed for uveitis. She is doing well on prednisone, she is able to walk long distances. She is able to bend down and is moving better. She is progressing well in PT, she is not stiff in the morning. Fatigue, brain fog is better. Sleeping better. She is on 5 mg daily prednisone. She was seen at [facility] and will be getting further blood tests after being off of prednisone for a month.  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    June 24, 2022 - She was seen by Allergist at [facility] for evaluation of Mast cell syndrome. She was given Levo cetrizine but shortly after that developed severe diarrhea and 3 pounds weight loss. She had to get IV fluids. 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 She was  also seen by Immunologist and her work up was normal. She gets random pain episodes when her arms or ankle will lock up. Once in a while she has pain in her wrists. Her back is feeling better after PT. She is also seeing GI for constipation and diarrhea issues. Her colonoscopy was normal. According to her she gets recurrent uveitis flares in her eyes and use Ketorolac eye drops as needed. Her main concerns are diarrhea, globus sensation in her chest, fatigue, brain fog, uveitis flares and intermittent joint pains.     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  She started Amitriptyline for possible IBS symptoms by her GI. She is getting weekly IV fluids now due to dehydration. She is still swollen and stiff in the morning. PT and pilates is helping her back. She take 2 tylenol twice a day.          Review of Systems:     Review Of Systems  Constitutional: denies fever, chills, night sweats and weight loss.  Skin: + skin rash.  Eyes: + dryness or irritation in eyes. No episode of eye inflammation or redness.   Ears/Nose/Throat: no recurrent sinus infections.  Respiratory: No shortness of breath, dyspnea on exertion, cough, or hemoptysis  Cardiovascular: no chest pain or palpitations.  Gastrointestinal: no nausea, vomiting, abdominal pain.  Normal bowel movements.  Genitourinary: no dysuria, frequency  or hematuria.  Musculoskeletal: as in HPI  Neurologic: no numbness, tingling.  Psychiatric: no mood disorders.  Hematologic/Lymphatic/Immunologic: no history of easy bruising, petechia or purpura.  No abnormal bleeding.   Endocrine: no h/o thyroid disease or Diabetes.                  Past Medical History:     Past Medical History:   Diagnosis Date     Alternating constipation and diarrhea 8/20/2022     Asthma      Discoid lupus      Diverticulosis of large intestine 06/2021    FOUND SCREENING SCOPE, TRANSVERSE/DESCENDING     Dysmenorrhea 8/20/2022     GERD  (gastroesophageal reflux disease) 10/04/2012     Menorrhagia with regular cycle 7/26/2019     Polycystic ovarian syndrome      POTS (postural orthostatic tachycardia syndrome) 08/2021     RW ALLERGIES/IMMUNOLOGY (ABSTRACTED)      Sinusitis      Subclavian vein thrombosis, right (H) 2000     Thoracic outlet syndrome associated with cervical rib 8/20/2022     Past Surgical History:   Procedure Laterality Date     ENDOSCOPIC SINUS SURGERY  7/16/2014    Procedure: ENDOSCOPIC SINUS SURGERY;  Surgeon: Suzi Vieyra MD;  Location: [facility]     EP STUDY TILT TABLE N/A 8/20/2021    Procedure: EP TILT TABLE;  Surgeon: Ilya Gilmore MD;  Location:  [facility]     ORTHOPEDIC SURGERY       TURBINECTOMY  7/16/2014    Procedure: TURBINECTOMY;  Surgeon: Suzi Vieyra MD;  Location: [facility]     Z NONSPECIFIC PROCEDURE  12/2000    resection of first rib     ZZC NONSPECIFIC PROCEDURE  1992    left foot surgery            Social History:     Social History     Occupational History     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     Employer: [employer]   Tobacco Use     Smoking status: Never     Smokeless tobacco: Never   Substance and Sexual Activity     Alcohol use: Not Currently     Comment: rare     Drug use: No     Sexual activity: Yes     Partners: Male     Birth control/protection: Male Surgical     Comment: vasectomy            Family History:     Family History   Problem Relation Age of Onset     Osteoporosis Mother      Musculoskeletal Disorder Father         Paget's disease      Hyperlipidemia Father      Allergies Sister      Other - See Comments Sister         endometriosis     Cerebrovascular Disease Sister 47        stroke after recent ocp start for perimenopausal sx     Cancer Maternal Grandmother         lung     EYE* Maternal Grandfather         glaucoma     Cancer - colorectal Paternal Grandfather      Prostate Cancer Paternal Grandfather       Musculoskeletal Disorder Paternal Uncle         Paget's disease     Breast Cancer Paternal Cousin 55        Second cousin     Cerebrovascular Disease Paternal Cousin 44            Allergies:     Allergies   Allergen Reactions     Adhesive Tape Hives     Cat Hair Extract Hives     Dogs Hives     wheezing     Dust Mites      Other reaction(s): Wheezing  eye mucous     Erythromycin      Gluten Meal      Other reaction(s): GI intolerance     Grass Hives     eye mucous     Oxycodone      Other reaction(s): Nausea, cramps, dizzyness     Pollen Extract      Other reaction(s): Other (see comments)  Tree pollen-hives, eye mucous     Ragweeds Hives     eye mucous     Trees      Other reaction(s): Other (see comments)  Sinus issues     Wheat Bran Hives     Other reaction(s): GI intolerance            Medications:     Current Outpatient Medications   Medication Sig Dispense Refill     acetaminophen (TYLENOL) 650 MG CR tablet        amitriptyline (ELAVIL) 25 MG tablet Take 25 mg by mouth At Bedtime       BABY ASPIRIN PO        budesonide (RINOCORT AQUA) 32 MCG/ACT nasal spray Spray 1 spray into both nostrils daily       CLARITIN 10 MG OR TABS 1 TABLET DAILY       fluocinonide (LIDEX) 0.05 % external ointment        ketorolac tromethamine (ACULAR-LS) 0.4 % SOLN ophthalmic solution        ketotifen (ZADITOR/REFRESH ANTI-ITCH) 0.025 % SOLN ophthalmic solution 1 drop       LANsoprazole (PREVACID) 15 MG DR capsule        levocetirizine (XYZAL) 5 MG tablet Take 5 mg by mouth every evening       Lidocaine (LIDOCARE) 4 % Patch        methylcellulose (CITRUCEL) 500 MG TABS tablet Take 100 mg by mouth daily       mupirocin (BACTROBAN) 2 % ointment PIPER A SMALL AMOUNT TO NOSTRILS ONCE OR BID  0     polyethylene glycol (MIRALAX) 17 GM/Dose powder Take 1 capful by mouth daily       pseudoePHEDrine (SUDAFED) 120 MG 12 hr tablet Take by mouth every 24 hours       SODIUM CHLORIDE-SODIUM BICARB NA        tacrolimus (PROTOPIC) 0.1 % external  ointment        UNABLE TO FIND MEDICATION NAME: Salt Stick Vitassium       vitamin B-12 (CYANOCOBALAMIN) 1000 MCG tablet        vitamin D3 (CHOLECALCIFEROL) 2000 units (50 mcg) tablet Take 1 tablet by mouth              Physical Exam:     Wt Readings from Last 4 Encounters:   10/13/22 104.6 kg (230 lb 8 oz)   08/15/22 101.6 kg (224 lb)   06/24/22 103.9 kg (229 lb)   05/04/22 103.7 kg (228 lb 9.6 oz)       Constitutional: Obese, appearing stated age; cooperative  MS: No synovitis or tenderness present over MCP, PIP, DIP joints, bilateral wrists, elbows.  Normal range of motion of shoulders.  No knee effusions.  No tenderness present over bilateral ankles, MTP joint.  She has multiple myofascial tender points, interscapular areas, knees, upper thighs etc.  Psych: nl judgement, orientation, memory, affect.         Data:     No results found for any visits on 10/13/22.    Recent Labs   Lab Test 09/03/21  1204 08/19/20  1141 07/26/19  1540 02/06/18 2034   WBC 9.8 9.6  --  12.7*   RBC 4.11 4.08  --  4.31   HGB 12.0 12.0  --  12.7   HCT 36.9 36.5  --  36.8   MCV 90 90  --  85   RDW 13.3 13.0  --  13.3    313  --  330   ALBUMIN 3.5 3.5 3.6 3.6   CRP  --  9.1*  --   --    BUN 8 10 6* 10      Recent Labs   Lab Test 09/03/21  1204 08/19/20  1141 09/20/17  1111   TSH 1.20 1.32 1.16   T4  --   --  2.66*     Hemoglobin   Date Value Ref Range Status   08/15/2022 11.6 (L) 11.7 - 15.7 g/dL Final   09/03/2021 12.0 11.7 - 15.7 g/dL Final   08/19/2020 12.0 11.7 - 15.7 g/dL Final   02/06/2018 12.7 11.7 - 15.7 g/dL Final   08/26/2016 12.7 11.7 - 15.7 g/dL Final     Urea Nitrogen   Date Value Ref Range Status   08/15/2022 7 7 - 30 mg/dL Final   09/03/2021 8 7 - 30 mg/dL Final   08/19/2020 10 7 - 30 mg/dL Final   07/26/2019 6 (L) 7 - 30 mg/dL Final   02/06/2018 10 7 - 30 mg/dL Final     Sed Rate   Date Value Ref Range Status   08/19/2020 38 (H) 0 - 20 mm/h Final     Erythrocyte Sedimentation Rate   Date Value Ref Range Status    03/22/2022 27 (H) 0 - 20 mm/hr Final   02/25/2022 1 0 - 20 mm/hr Final   09/17/2021 41 (H) 0 - 20 mm/hr Final     CRP Inflammation   Date Value Ref Range Status   03/22/2022 3.6 0.0 - 8.0 mg/L Final   02/25/2022 11.9 (H) 0.0 - 8.0 mg/L Final   09/17/2021 10.9 (H) 0.0 - 8.0 mg/L Final   08/19/2020 9.1 (H) 0.0 - 8.0 mg/L Final     AST   Date Value Ref Range Status   08/15/2022 14 0 - 45 U/L Final   09/03/2021 15 0 - 45 U/L Final   08/19/2020 11 0 - 45 U/L Final   07/26/2019 17 0 - 45 U/L Final   02/06/2018 15 0 - 45 U/L Final     Albumin   Date Value Ref Range Status   08/15/2022 3.3 (L) 3.4 - 5.0 g/dL Final   09/03/2021 3.5 3.4 - 5.0 g/dL Final   08/19/2020 3.5 3.4 - 5.0 g/dL Final   07/26/2019 3.6 3.4 - 5.0 g/dL Final   02/06/2018 3.6 3.4 - 5.0 g/dL Final     Alkaline Phosphatase   Date Value Ref Range Status   08/15/2022 64 40 - 150 U/L Final   09/03/2021 58 40 - 150 U/L Final   08/19/2020 51 40 - 150 U/L Final   07/26/2019 50 40 - 150 U/L Final   02/06/2018 54 40 - 150 U/L Final     ALT   Date Value Ref Range Status   08/15/2022 23 0 - 50 U/L Final   09/03/2021 28 0 - 50 U/L Final   08/19/2020 24 0 - 50 U/L Final   07/26/2019 31 0 - 50 U/L Final   02/06/2018 26 0 - 50 U/L Final     Rheumatoid Factor   Date Value Ref Range Status   09/17/2021 <7 <20 IU/mL Final     Recent Labs   Lab Test 08/15/22  1027 09/03/21  1204 08/19/20  1141   WBC 8.9 9.8 9.6   HGB 11.6* 12.0 12.0   HCT 36.6 36.9 36.5   MCV 87 90 90    383 313   BUN 7 8 10   TSH 1.15 1.20 1.32   AST 14 15 11   ALT 23 28 24   ALKPHOS 64 58 51       Outside studies reviewed: Records from [facility] rheumatology consultant and [place] [facility] reviewed    Reviewed Rheumatology lab flowsheet    Assessment     Polyarthralgia  Myofascial pain-fibromyalgia  History of right subclavian vein DVT-1999  Thoracic outlet syndrome status post right first rib resection  POTS, PCOS  Sicca symptoms  Elevated CRP, ESR   Neg RF, Anti CCP, ANCA, KARSTEN panel, dsDNA,  Normal C3/C4    Polyarthralgia : She had arthralgia in her hands, feet, shoulders, lower back, neck area. Stiffness in her feet in the morning.  She was given prednisone taper and did notice improvement with prednisone taper. Her CRP normalized to 3.6. On physical exam she had no synovitis or tenderness over her joints, no dactylitis, plantar fascitis or achillis tendinitis. MRI pelvis did not show sacroiliitis.  She is HLA-B27 negative. She was prescribed Methotrexate on her last visit for possible Spondyloarthropathy in the light of recurrent uveitis flares, elevated ESR, CRP and polyarthralgia. She has not started it yet since she is getting allergy and GI work up done. No joint inflammation noted on exam today. She has noted benefit with PT and is getting treated for Mast cell activation syndrome and IBS which is helping her symptoms overall.        Chronic low back pain: She reports chronic low back pain.  MRI pelvis done in 12/2021 did not show any evidence of sacroiliitis.    Recurrent facial flushing, diarrhea, urticaria symptoms :  Recurrent facial flushing, urticaria, diarrhea can be related to mast cell activation syndrome. She was seen by Allergist and was diagnosed with mast cell disorder.     Plan      She is doing better     No inflammation noted on exam today     No need of steroids and immune suppression     Symptoms are related to Fibromyalgia     Follow up as needed     Michelle Arias MD  [facility]  Department of Rheumatology & Autoimmune Disorders  [facility]: [phone]  Pager - [phone]     TT 30 min was spent on date of the encounter doing chart review, history and exam, documentation and further activities as noted above. Any prior notes, outside records, laboratory results, and imaging studies were reviewed if relevant.    Patient verbalized agreement with and understanding of the rationale for the diagnosis and treatment plan.  All questions were  answered to best of my ability and the patient's satisfaction.      Chart documentation done in part with Dragon Voice recognition Software. Although reviewed after completion, some word and grammatical error may remain.

## 2022-10-14 ENCOUNTER — HOSPITAL ENCOUNTER (OUTPATIENT)
Dept: OCCUPATIONAL THERAPY | Facility: CLINIC | Age: 47
Discharge: HOME | End: 2022-10-14
Payer: COMMERCIAL

## 2022-10-14 DIAGNOSIS — I89.0 LYMPHEDEMA OF ARM: Primary | ICD-10-CM

## 2022-10-14 DIAGNOSIS — I89.0 LYMPHEDEMA: ICD-10-CM

## 2022-10-14 DIAGNOSIS — Z86.718 HISTORY OF DVT (DEEP VEIN THROMBOSIS): ICD-10-CM

## 2022-10-14 DIAGNOSIS — I87.1 VENOUS THORACIC OUTLET SYNDROME OF RIGHT SUBCLAVIAN VEIN: ICD-10-CM

## 2022-10-14 PROCEDURE — 97140 MANUAL THERAPY 1/> REGIONS: CPT | Mod: GO

## 2022-11-18 ENCOUNTER — HOSPITAL ENCOUNTER (OUTPATIENT)
Dept: OCCUPATIONAL THERAPY | Facility: CLINIC | Age: 47
Discharge: HOME | End: 2022-11-18
Payer: COMMERCIAL

## 2022-11-18 DIAGNOSIS — Z86.718 HISTORY OF DVT (DEEP VEIN THROMBOSIS): ICD-10-CM

## 2022-11-18 DIAGNOSIS — I89.0 LYMPHEDEMA OF ARM: Primary | ICD-10-CM

## 2022-11-18 DIAGNOSIS — I87.1 VENOUS THORACIC OUTLET SYNDROME OF RIGHT SUBCLAVIAN VEIN: ICD-10-CM

## 2022-11-18 PROCEDURE — 97140 MANUAL THERAPY 1/> REGIONS: CPT | Mod: GO

## 2022-11-22 ENCOUNTER — MEDICAL CORRESPONDENCE (OUTPATIENT)
Dept: HEALTH INFORMATION MANAGEMENT | Facility: CLINIC | Age: 47
End: 2022-11-22

## 2022-12-13 ENCOUNTER — TRANSFERRED RECORDS (OUTPATIENT)
Dept: HEALTH INFORMATION MANAGEMENT | Facility: CLINIC | Age: 47
End: 2022-12-13

## 2023-01-06 ENCOUNTER — HOSPITAL ENCOUNTER (OUTPATIENT)
Dept: OCCUPATIONAL THERAPY | Facility: CLINIC | Age: 48
Discharge: HOME | End: 2023-01-06
Payer: COMMERCIAL

## 2023-01-06 DIAGNOSIS — I89.0 LYMPHEDEMA: ICD-10-CM

## 2023-01-06 DIAGNOSIS — Z86.718 HISTORY OF DVT (DEEP VEIN THROMBOSIS): ICD-10-CM

## 2023-01-06 DIAGNOSIS — I87.1 VENOUS THORACIC OUTLET SYNDROME OF RIGHT SUBCLAVIAN VEIN: ICD-10-CM

## 2023-01-06 DIAGNOSIS — I89.0 LYMPHEDEMA OF ARM: Primary | ICD-10-CM

## 2023-01-06 PROCEDURE — 2894A VOIDCORRECT: CPT | Mod: GO

## 2023-01-09 NOTE — ADDENDUM NOTE
Encounter addended by: Sarah Dc, OT on: 1/9/2023 12:10 PM   Actions taken: Episode edited, Visit diagnoses modified, Flowsheet data copied forward, Flowsheet accepted

## 2023-01-26 NOTE — TELEPHONE ENCOUNTER
Suzi Crowder, APRN Azra Mathew RN  I am OK with her having a standing order for an additional 1L of fluid/week at the infusion center.       Infusion order created and faxed to [facility] at [phone]  Infuse 1000ml Normal Saline via peripheral IV as needed up to once a week   Please infuse over 1-2 hours per patients tolerance    Azra Chan, RN on 6/7/2022 at 9:01 AM     Never

## 2023-02-03 ENCOUNTER — HOSPITAL ENCOUNTER (OUTPATIENT)
Dept: OCCUPATIONAL THERAPY | Facility: CLINIC | Age: 48
Discharge: HOME | End: 2023-02-03
Payer: COMMERCIAL

## 2023-02-03 DIAGNOSIS — I89.0 LYMPHEDEMA: ICD-10-CM

## 2023-02-03 DIAGNOSIS — I87.1 VENOUS THORACIC OUTLET SYNDROME OF RIGHT SUBCLAVIAN VEIN: ICD-10-CM

## 2023-02-03 DIAGNOSIS — I89.0 LYMPHEDEMA OF ARM: Primary | ICD-10-CM

## 2023-02-03 DIAGNOSIS — Z86.718 HISTORY OF DVT (DEEP VEIN THROMBOSIS): ICD-10-CM

## 2023-02-03 PROCEDURE — 97140 MANUAL THERAPY 1/> REGIONS: CPT | Mod: GO

## 2023-02-28 ENCOUNTER — OFFICE VISIT (OUTPATIENT)
Dept: CARDIOLOGY | Facility: CLINIC | Age: 48
End: 2023-02-28
Payer: COMMERCIAL

## 2023-02-28 VITALS
DIASTOLIC BLOOD PRESSURE: 64 MMHG | HEART RATE: 99 BPM | BODY MASS INDEX: 41.45 KG/M2 | OXYGEN SATURATION: 99 % | SYSTOLIC BLOOD PRESSURE: 135 MMHG | WEIGHT: 234 LBS

## 2023-02-28 DIAGNOSIS — R42 DIZZINESS ON STANDING: Primary | ICD-10-CM

## 2023-02-28 PROCEDURE — 99215 OFFICE O/P EST HI 40 MIN: CPT | Performed by: INTERNAL MEDICINE

## 2023-02-28 NOTE — PROGRESS NOTES
I am delighted to see Damaris Sarmiento as a new patient in [facility] for evaluation of POTS.     History of Present Illness:  The patient is a 47 year old  Female seen previously by Dr. Gilmore, with symptoms of dizziness with positional changes. No syncope. She discovered a POTS support group on social media and thought her symptoms were consistent. She saw Dr. Gilmore for evaluation. Tilt table test 2021 normal. She had been recommended to wear compression shorts, aggressive hydration, eat small meals,liberalize salt intake, exercise. Last seen by EP NP 3/25/2022.    Damaris is here with her . Her symptoms had always been dizziness with positional changes. There had not been excessive tachycardia or hypotension documented on orthostatics or tilt table tests. She drinks about 64 oz of water a day, has difficulty keeping more fluid down. She feels that she has difficulty with GI motility. She has alternating diarrhea and constipation. She has a standing order from Dr. Gilmore for IV fluids which she had not needed for a while. In the summer of 2022 symptoms became worse and she went for IVF once a week through a peripheral IV. Now she goes every other week and she thinks she's stable. She is however concerned that reducing IVF frequency could lead to kidney injury. She tried to wear abdominal binders but they resulted in increased upper extremity edema.    Past Medical History:  Diverticulosis  GERD  Polycystic ovarian syndrome  Thoracic outlet syndrome right s/p clavicle removal  Constipation/diarrhea     Medications:   Aspirin 81 every day  Levocetirizine  Lansoprazole  Claritan  Eye drops    Allergies:    Allergies   Allergen Reactions     Adhesive Tape Hives     Cat Hair Extract Hives     Dogs Hives     wheezing     Dust Mites      Other reaction(s): Wheezing  eye mucous     Erythromycin      Gluten Meal      Other reaction(s): GI intolerance     Grass Hives     eye mucous     Oxycodone       Other reaction(s): Nausea, cramps, dizzyness     Pollen Extract      Other reaction(s): Other (see comments)  Tree pollen-hives, eye mucous     Ragweeds Hives     eye mucous     Trees      Other reaction(s): Other (see comments)  Sinus issues     Wheat Bran Hives     Other reaction(s): GI intolerance         Physical examination  Vitals: /64 (BP Location: Left arm, Patient Position: Chair, Cuff Size: Adult Large)   Pulse 99   Wt 106.1 kg (234 lb)   SpO2 99%   BMI 41.45 kg/m    BMI= Body mass index is 41.45 kg/m .    Constitutional: In general, the patient is a pleasant female in no acute distress.    Cardiovascular: Carotids +2/2 bilaterally without bruits.  No jugular venous distension. Regular rate and rhythm. Normal S1, S2. No murmur, rub, click, or gallop.   Extremities: Pulses are normal bilaterally throughout. No peripheral edema.  Respiratory: Clear to asculation.  No ronchi, wheezes, rales.  No dullness to percussion.     I have personally and independently reviewed the following:  Labs:   8/15/2022: chol 208, HDL 63, , , cr 0.56, TSH 1.15, hgb 11, plt 416K, A1C 5.4    Thoracic outlet ultrasound 9/22/2016:  No DVT  Occlusion of right axillary artery and vein with thoracic outlet maneuvers. In neutral position these vessels are patent    EKG:   3/25/2022: sinus 99 bpm, normal intervals    Tilt table test 8/20/2021 (Dr. Gilmore):  Baseline HR 71, 124/72  Tilt for 20 minutes HR 81, /69    Assessment :  Positional dizziness with negative tilt table testing.  IVF helpful, she is unable to tolerate more water orally.  Reassured her that her labs from August showed normal renal function, and continued to encourage her to drink more. Encouraged her wear compression stockings.  She is requesting to continue the PRN IVF order made by Dr. Gilmore.          I spent a total of 30 minutes face to face with  Damaris Sarmiento during today's office visit. I have spend an additional 20  minutes today on chart review and documentation.    The patient is to return as above . The patient understood the treatment plan as outlined above.  There were no barriers to learning.      Maddy Patton MD

## 2023-02-28 NOTE — PATIENT INSTRUCTIONS
Take your medicines every day, as directed    Dizziness    Changes made today: none  Aggressive hydration  Compression stockings    Cardiology Care Coordinators:      Lucy Mack, TRENTON Rich, RN     Shira Keys, RN    Cardiology Rooming staff:  BRENNAN Mckenzie    Phone  [phone]      Fax [phone]    To Contact us    During Business Hours:  [phone]     If you are needing refills please contact your pharmacy.     For urgent after hour care please call the [facility] at [phone] or the [facility] at [phone] and ask to speak to the cardiologist on call.         HOW TO CHECK YOUR BLOOD PRESSURE AT HOME:    Avoid eating, smoking, and exercising for at least 30 minutes before taking a reading.    Be sure you have taken your BP medication at least 2-3 hours before you check it.     Sit quietly for 10 minutes before a reading.     Sit in a chair with your feet flat on the floor. Rest your  arm on a table so that the arm cuff is at the same level as your heart.    Remain still during the reading.  Record your blood pressure and pulse in a log and bring to your next appointment.     Use [employer] allows you to communicate directly with your heart team through secure messaging.  Flinja can be accessed any time on your phone, computer, or tablet.  If you need assistance, we'd be happy to help!         Keep your Heart Appointments:    As needed

## 2023-02-28 NOTE — NURSING NOTE
Damaris Sarmiento's goals for this visit include:   Chief Complaint   Patient presents with     Follow Up     POTS follow up        She requests these members of her care team be copied on today's visit information: yes     PCP: No Ref-Primary, Physician    Referring Provider:  No referring provider defined for this encounter.    /64 (BP Location: Left arm, Patient Position: Chair, Cuff Size: Adult Large)   Pulse 99   Wt 106.1 kg (234 lb)   SpO2 99%   BMI 41.45 kg/m      Do you need any medication refills at today's visit? No     TANNER Sanabria   Cardiology Team  [facility]

## 2023-03-03 ENCOUNTER — HOSPITAL ENCOUNTER (OUTPATIENT)
Dept: OCCUPATIONAL THERAPY | Facility: CLINIC | Age: 48
Discharge: HOME | End: 2023-03-03
Payer: COMMERCIAL

## 2023-03-03 DIAGNOSIS — I89.0 LYMPHEDEMA OF ARM: Primary | ICD-10-CM

## 2023-03-03 DIAGNOSIS — I87.1 VENOUS THORACIC OUTLET SYNDROME OF RIGHT SUBCLAVIAN VEIN: ICD-10-CM

## 2023-03-03 PROCEDURE — 97140 MANUAL THERAPY 1/> REGIONS: CPT | Mod: GO

## 2023-03-20 ENCOUNTER — VIRTUAL VISIT (OUTPATIENT)
Dept: FAMILY MEDICINE | Facility: CLINIC | Age: 48
End: 2023-03-20
Payer: COMMERCIAL

## 2023-03-20 DIAGNOSIS — U07.1 INFECTION DUE TO 2019 NOVEL CORONAVIRUS: Primary | ICD-10-CM

## 2023-03-20 DIAGNOSIS — G90.A POTS (POSTURAL ORTHOSTATIC TACHYCARDIA SYNDROME): ICD-10-CM

## 2023-03-20 PROCEDURE — 99213 OFFICE O/P EST LOW 20 MIN: CPT | Mod: VID | Performed by: INTERNAL MEDICINE

## 2023-03-20 NOTE — PROGRESS NOTES
"Damaris is a 47 year old who is being evaluated via a billable video visit.      How would you like to obtain your AVS? MyChart  If the video visit is dropped, the invitation should be resent by: Text to cell phone: [phone]  Will anyone else be joining your video visit? No          Assessment & Plan     Infection due to 2019 novel coronavirus  She had some cough and congestion symptoms last week  These have resolved  She started getting diarrhea on Saturday night  Also started having some new cough again   today she checked and was positive for COVID   also has symptoms and is positive for COVID  Discussed about the side effects of Paxlovid  Discussed about rebound COVID  Discussed about nausea/diarrhea/dizziness  Discussed about rare skin reaction  Discussed about bitter taste in the mouth  Discussed about quarantine measures  No medication interactions identified  - nirmatrelvir and ritonavir (PAXLOVID) 300 mg/100 mg therapy pack; Take 3 tablets by mouth 2 times daily for 5 days (Take 2 Nirmatrelvir tablets and 1 Ritonavir tablet twice daily for 5 days)    POTS (postural orthostatic tachycardia syndrome)  She follows up with cardiologist   No medication interactions identified with the medicine that he is currently taking for POTS        25 minutes spent on the date of the encounter doing chart review, history and exam, documentation and further activities per the note       BMI:   Estimated body mass index is 41.45 kg/m  as calculated from the following:    Height as of 10/13/22: 1.6 m (5' 3\").    Weight as of 2/28/23: 106.1 kg (234 lb).           Return in about 4 weeks (around 4/17/2023), or if symptoms worsen or fail to improve.    Alan Tobar MD  [facility]   Damaris is a 47 year old, presenting for the following health issues:  covid treatment      HPI       COVID-19 Symptom Review  How many days ago did these symptoms start? One week     Are any of the " following symptoms significant for you?    New or worsening difficulty breathing? No    Worsening cough? No    Fever or chills? No    Headache: No    Sore throat: YES- glands feel swollen    Chest pain: No    Diarrhea: YES    Body aches? No    What treatments has patient tried? Acetaminophen and Decongestant - oral   Does patient live in a nursing home, group home, or shelter? No  Does patient have a way to get food/medications during quarantined? Yes, I have a friend or family member who can help me.                  Review of Systems   Constitutional, HEENT, cardiovascular, pulmonary, gi and gu systems are negative, except as otherwise noted.      Objective           Vitals:  No vitals were obtained today due to virtual visit.    Physical Exam   GENERAL: Healthy, alert and no distress  EYES: Eyes grossly normal to inspection.  No discharge or erythema, or obvious scleral/conjunctival abnormalities.  RESP: No audible wheeze, cough, or visible cyanosis.  No visible retractions or increased work of breathing.    SKIN: Visible skin clear. No significant rash, abnormal pigmentation or lesions.  NEURO: Cranial nerves grossly intact.  Mentation and speech appropriate for age.  PSYCH: Mentation appears normal, affect normal/bright, judgement and insight intact, normal speech and appearance well-groomed.                Video-Visit Details    Type of service:  Video Visit     Originating Location (pt. Location): Home    Distant Location (provider location):  Off-site  Platform used for Video Visit: [employer]

## 2023-04-14 ENCOUNTER — HOSPITAL ENCOUNTER (OUTPATIENT)
Dept: OCCUPATIONAL THERAPY | Facility: CLINIC | Age: 48
Discharge: HOME | End: 2023-04-14
Payer: COMMERCIAL

## 2023-04-14 DIAGNOSIS — I89.0 LYMPHEDEMA OF ARM: Primary | ICD-10-CM

## 2023-04-14 DIAGNOSIS — I87.1 VENOUS THORACIC OUTLET SYNDROME OF RIGHT SUBCLAVIAN VEIN: ICD-10-CM

## 2023-04-14 DIAGNOSIS — I89.0 LYMPHEDEMA: ICD-10-CM

## 2023-04-14 DIAGNOSIS — Z86.718 HISTORY OF DVT (DEEP VEIN THROMBOSIS): ICD-10-CM

## 2023-04-14 PROCEDURE — 97140 MANUAL THERAPY 1/> REGIONS: CPT | Mod: GO

## 2023-04-17 NOTE — ADDENDUM NOTE
Encounter addended by: Torrie Sutherland RN on: 4/17/2023 12:04 PM   Actions taken: Care Plan modified

## 2023-05-12 ENCOUNTER — HOSPITAL ENCOUNTER (OUTPATIENT)
Dept: OCCUPATIONAL THERAPY | Facility: CLINIC | Age: 48
Discharge: HOME | End: 2023-05-12
Attending: INTERNAL MEDICINE
Payer: COMMERCIAL

## 2023-05-12 DIAGNOSIS — I89.0 LYMPHEDEMA OF ARM: ICD-10-CM

## 2023-05-12 DIAGNOSIS — I89.0 LYMPHEDEMA: ICD-10-CM

## 2023-05-12 DIAGNOSIS — Z86.718 HISTORY OF DVT (DEEP VEIN THROMBOSIS): ICD-10-CM

## 2023-05-12 DIAGNOSIS — I87.1 VENOUS THORACIC OUTLET SYNDROME OF RIGHT SUBCLAVIAN VEIN: ICD-10-CM

## 2023-05-12 PROCEDURE — 97140 MANUAL THERAPY 1/> REGIONS: CPT | Mod: GO

## 2023-05-31 ENCOUNTER — VIRTUAL VISIT (OUTPATIENT)
Dept: FAMILY MEDICINE | Facility: CLINIC | Age: 48
End: 2023-05-31
Payer: COMMERCIAL

## 2023-05-31 DIAGNOSIS — I87.1 VENOUS THORACIC OUTLET SYNDROME OF RIGHT SUBCLAVIAN VEIN: ICD-10-CM

## 2023-05-31 DIAGNOSIS — G90.A POTS (POSTURAL ORTHOSTATIC TACHYCARDIA SYNDROME): Primary | ICD-10-CM

## 2023-05-31 DIAGNOSIS — Z86.718 HISTORY OF DVT (DEEP VEIN THROMBOSIS): ICD-10-CM

## 2023-05-31 PROCEDURE — 99214 OFFICE O/P EST MOD 30 MIN: CPT | Mod: VID | Performed by: INTERNAL MEDICINE

## 2023-05-31 NOTE — PROGRESS NOTES
"Infusion order created and faxed to [facility] at [phone]  Infuse 1000ml Normal Saline via peripheral IV as needed up to once a week   Please infuse over 1-2 hours per patients tolerance      Damaris is a 47 year old who is being evaluated via a billable video visit.      How would you like to obtain your AVS? MyChart  If the video visit is dropped, the invitation should be resent by: Text to cell phone: [phone]  Will anyone else be joining your video visit? No          Assessment & Plan     POTS (postural orthostatic tachycardia syndrome)  She gets periodic infusions with normal saline for this  This was being ordered by her cardiologist before  Currently he has left [place]  I do not have expertise in the management of POTS syndrome but she tells me because of these infusions and the salt tablets she takes she is [facility] the [facility] now and she is worried that if she does not get these infusions her POTS will get worse  I will order the infusions for 3 months  I advised her to find a cardiologist who has expertise in POTS syndrome who can help her with ongoing care    Venous thoracic outlet syndrome of right subclavian vein  Status post surgery she has lymphedema in the arm     History of DVT (deep vein thrombosis)  She is status post anticoagulation    There is also something written in the chart that she has antiphospholipid antibody syndrome  She was being seen by the rheumatology team  Also she has a history of mast cell degranulation disorder and she follows with allergy specialist        30 minutes spent by me on the date of the encounter doing chart review, history and exam, documentation and further activities per the note       BMI:   Estimated body mass index is 41.45 kg/m  as calculated from the following:    Height as of 10/13/22: 1.6 m (5' 3\").    Weight as of 2/28/23: 106.1 kg (234 lb).           Alan Tobar MD  [facility]   Damaris is a 47 year " old, presenting for the following health issues:  Recheck Medication (Esablish care/) and Establish Care        5/31/2023     1:31 PM   Additional Questions   Roomed by Staci     History of Present Illness       Reason for visit:  Establish Primary Care, IV order renewal    She eats 4 or more servings of fruits and vegetables daily.She consumes 1 sweetened beverage(s) daily.She exercises with enough effort to increase her heart rate 30 to 60 minutes per day.  She exercises with enough effort to increase her heart rate 4 days per week.   She is taking medications regularly.               Review of Systems   Constitutional, HEENT, cardiovascular, pulmonary, gi and gu systems are negative, except as otherwise noted.      Objective    Vitals - Patient Reported  Pain Score: No Pain (0)        Physical Exam   GENERAL: Healthy, alert and no distress  EYES: Eyes grossly normal to inspection.  No discharge or erythema, or obvious scleral/conjunctival abnormalities.  RESP: No audible wheeze, cough, or visible cyanosis.  No visible retractions or increased work of breathing.    SKIN: Visible skin clear. No significant rash, abnormal pigmentation or lesions.  NEURO: Cranial nerves grossly intact.  Mentation and speech appropriate for age.  PSYCH: Mentation appears normal, affect normal/bright, judgement and insight intact, normal speech and appearance well-groomed.                Video-Visit Details    Type of service:  Video Visit     Originating Location (pt. Location): Home    Distant Location (provider location):  On-site  Platform used for Video Visit: Eric

## 2023-06-01 ENCOUNTER — TELEPHONE (OUTPATIENT)
Dept: FAMILY MEDICINE | Facility: CLINIC | Age: 48
End: 2023-06-01
Payer: COMMERCIAL

## 2023-07-27 ENCOUNTER — TRANSFERRED RECORDS (OUTPATIENT)
Dept: HEALTH INFORMATION MANAGEMENT | Facility: CLINIC | Age: 48
End: 2023-07-27
Payer: COMMERCIAL

## 2023-08-04 ENCOUNTER — THERAPY VISIT (OUTPATIENT)
Dept: OCCUPATIONAL THERAPY | Facility: CLINIC | Age: 48
End: 2023-08-04
Payer: COMMERCIAL

## 2023-08-04 DIAGNOSIS — Z86.718 HISTORY OF DVT (DEEP VEIN THROMBOSIS): ICD-10-CM

## 2023-08-04 DIAGNOSIS — I89.0 LYMPHEDEMA OF ARM: Primary | ICD-10-CM

## 2023-08-04 DIAGNOSIS — I89.0 LYMPHEDEMA: ICD-10-CM

## 2023-08-04 DIAGNOSIS — I87.1 VENOUS THORACIC OUTLET SYNDROME OF RIGHT SUBCLAVIAN VEIN: ICD-10-CM

## 2023-08-04 PROCEDURE — 97140 MANUAL THERAPY 1/> REGIONS: CPT | Mod: GO

## 2023-08-31 ENCOUNTER — TRANSFERRED RECORDS (OUTPATIENT)
Dept: HEALTH INFORMATION MANAGEMENT | Facility: CLINIC | Age: 48
End: 2023-08-31
Payer: COMMERCIAL

## 2023-09-01 ENCOUNTER — TELEPHONE (OUTPATIENT)
Dept: FAMILY MEDICINE | Facility: CLINIC | Age: 48
End: 2023-09-01
Payer: COMMERCIAL

## 2023-09-01 NOTE — TELEPHONE ENCOUNTER
Forms/Letter Request    Type of form/letter:  [facility]    Have you been seen for this request: N/A    Do we have the form/letter: Yes: Will place form on providers desk for review/signature    When is form/letter needed by: asap    How would you like the form/letter returned: Fax : [phone]

## 2023-09-06 ENCOUNTER — MYC MEDICAL ADVICE (OUTPATIENT)
Dept: FAMILY MEDICINE | Facility: CLINIC | Age: 48
End: 2023-09-06
Payer: COMMERCIAL

## 2023-09-06 NOTE — CONFIDENTIAL NOTE
Forms received  Cannot complete these forms without discussing the issues with the patient as I do not order infusion therapies  This will be discussed at the next visit with the patient  Please call patient and let her know that we will discuss this issue at the upcoming visit

## 2023-09-07 NOTE — TELEPHONE ENCOUNTER
Routing to provider to review and advise.    See Mychart message regarding fluids.      Kristina Kjellberg, MSN, RN  [facility] Primary [facility] Triage     No

## 2023-09-08 ENCOUNTER — THERAPY VISIT (OUTPATIENT)
Dept: OCCUPATIONAL THERAPY | Facility: CLINIC | Age: 48
End: 2023-09-08
Payer: COMMERCIAL

## 2023-09-08 DIAGNOSIS — I89.0 LYMPHEDEMA: ICD-10-CM

## 2023-09-08 DIAGNOSIS — I87.1 VENOUS THORACIC OUTLET SYNDROME OF RIGHT SUBCLAVIAN VEIN: ICD-10-CM

## 2023-09-08 DIAGNOSIS — I89.0 LYMPHEDEMA OF ARM: Primary | ICD-10-CM

## 2023-09-08 PROCEDURE — 97140 MANUAL THERAPY 1/> REGIONS: CPT | Mod: GO

## 2023-09-11 ENCOUNTER — MEDICAL CORRESPONDENCE (OUTPATIENT)
Dept: HEALTH INFORMATION MANAGEMENT | Facility: CLINIC | Age: 48
End: 2023-09-11
Payer: COMMERCIAL

## 2023-09-13 ENCOUNTER — OFFICE VISIT (OUTPATIENT)
Dept: FAMILY MEDICINE | Facility: CLINIC | Age: 48
End: 2023-09-13
Attending: INTERNAL MEDICINE
Payer: COMMERCIAL

## 2023-09-13 ENCOUNTER — TELEPHONE (OUTPATIENT)
Dept: FAMILY MEDICINE | Facility: CLINIC | Age: 48
End: 2023-09-13
Payer: COMMERCIAL

## 2023-09-13 VITALS
HEIGHT: 63 IN | WEIGHT: 234.1 LBS | OXYGEN SATURATION: 99 % | BODY MASS INDEX: 41.48 KG/M2 | HEART RATE: 99 BPM | DIASTOLIC BLOOD PRESSURE: 81 MMHG | SYSTOLIC BLOOD PRESSURE: 137 MMHG | RESPIRATION RATE: 16 BRPM | TEMPERATURE: 97.6 F

## 2023-09-13 DIAGNOSIS — R19.8 ALTERNATING CONSTIPATION AND DIARRHEA: ICD-10-CM

## 2023-09-13 DIAGNOSIS — G90.A POTS (POSTURAL ORTHOSTATIC TACHYCARDIA SYNDROME): Primary | ICD-10-CM

## 2023-09-13 DIAGNOSIS — Z86.718 HISTORY OF DVT (DEEP VEIN THROMBOSIS): ICD-10-CM

## 2023-09-13 PROCEDURE — 99214 OFFICE O/P EST MOD 30 MIN: CPT | Performed by: INTERNAL MEDICINE

## 2023-09-13 ASSESSMENT — PAIN SCALES - GENERAL: PAINLEVEL: NO PAIN (0)

## 2023-09-13 NOTE — PROGRESS NOTES
"  Assessment & Plan     POTS (postural orthostatic tachycardia syndrome)  She follows up with a cardiologist  She does take salt tablets  She gets periodic IV infusions  I have ordered again today 1 L of normal saline as needed to be infused every weekly    Alternating constipation and diarrhea  She follows with gastroenterologist  Already had a colonoscopy which was negative    History of DVT (deep vein thrombosis)  She is off anticoagulation  She is currently only on aspirin    She has mastocytosis and is on Rhinocort/Claritin/Xyzal and follows up with allergist      20 minutes spent by me on the date of the encounter doing chart review, history and exam, documentation and further activities per the note       BMI:   Estimated body mass index is 41.47 kg/m  as calculated from the following:    Height as of this encounter: 1.6 m (5' 3\").    Weight as of this encounter: 106.2 kg (234 lb 1.6 oz).           Alan Tobar MD  [facility] ALEKSANDAR Ocampo is a 48 year old, presenting for the following health issues:  Follow Up      9/13/2023     4:28 PM   Additional Questions   Roomed by TRENTON Hunt   Accompanied by Self         9/13/2023     4:28 PM   Patient Reported Additional Medications   Patient reports taking the following new medications Xolair       History of Present Illness       Reason for visit:  Establishing Primary Care Annual Well Visit    She eats 4 or more servings of fruits and vegetables daily.She consumes 1 sweetened beverage(s) daily.She exercises with enough effort to increase her heart rate 30 to 60 minutes per day.  She exercises with enough effort to increase her heart rate 4 days per week.   She is taking medications regularly.               Review of Systems   Constitutional, HEENT, cardiovascular, pulmonary, gi and gu systems are negative, except as otherwise noted.      Objective    /81 (BP Location: Right arm, Patient Position: Sitting, Cuff Size: Adult " "Large)   Pulse 99   Temp 97.6  F (36.4  C) (Tympanic)   Resp 16   Ht 1.6 m (5' 3\")   Wt 106.2 kg (234 lb 1.6 oz)   LMP 08/23/2023 (Exact Date)   SpO2 99%   BMI 41.47 kg/m    Body mass index is 41.47 kg/m .  Physical Exam   GENERAL: healthy, alert and no distress  EYES: Eyes grossly normal to inspection, PERRL and conjunctivae and sclerae normal  RESP: lungs clear to auscultation - no rales, rhonchi or wheezes  CV: regular rate and rhythm, normal S1 S2, no S3 or S4, no murmur, click or rub, no peripheral edema and peripheral pulses strong  MS: no gross musculoskeletal defects noted, no edema                      "

## 2023-09-13 NOTE — TELEPHONE ENCOUNTER
Forms/Letter Request    Type of form/letter:  [facility]    Have you been seen for this request: N/A    Do we have the form/letter: Yes: Will place form on providers desk for review/signature    When is form/letter needed by: 9/13     How would you like the form/letter returned: Fax : [phone]

## 2023-09-14 ENCOUNTER — MEDICAL CORRESPONDENCE (OUTPATIENT)
Dept: HEALTH INFORMATION MANAGEMENT | Facility: CLINIC | Age: 48
End: 2023-09-14
Payer: COMMERCIAL

## 2023-09-14 NOTE — TELEPHONE ENCOUNTER
[facility] calling needing updates on the form. They state the form needs to have patient name, date of birth, and ICD codes. They state name and  were on cover letter but need it on the actual order. Patient is scheduled for visit today so need this asap.          Speedy Betancourt

## 2023-09-16 ASSESSMENT — ENCOUNTER SYMPTOMS
JOINT SWELLING: 0
ABDOMINAL PAIN: 1
HEADACHES: 0
NERVOUS/ANXIOUS: 0
COUGH: 0
HEMATURIA: 0
DIZZINESS: 1
CONSTIPATION: 1
MYALGIAS: 0
NAUSEA: 0
PARESTHESIAS: 0
BREAST MASS: 0
DYSURIA: 0
SHORTNESS OF BREATH: 0
FREQUENCY: 0
DIARRHEA: 1
ARTHRALGIAS: 0
SORE THROAT: 0
CHILLS: 0
HEARTBURN: 1
PALPITATIONS: 0
FEVER: 0
WEAKNESS: 0
HEMATOCHEZIA: 0

## 2023-09-17 ENCOUNTER — HEALTH MAINTENANCE LETTER (OUTPATIENT)
Age: 48
End: 2023-09-17

## 2023-09-18 PROBLEM — M67.952 TENDINOPATHY OF LEFT GLUTEUS MEDIUS: Status: ACTIVE | Noted: 2019-01-10

## 2023-09-18 PROBLEM — R14.0 ABDOMINAL BLOATING: Status: ACTIVE | Noted: 2023-09-18

## 2023-09-18 PROBLEM — R12 HEARTBURN: Status: ACTIVE | Noted: 2022-12-13

## 2023-09-18 PROBLEM — K31.7 POLYP OF DUODENUM: Status: ACTIVE | Noted: 2022-12-13

## 2023-09-18 PROBLEM — S73.109A SPRAIN OF HIP: Status: ACTIVE | Noted: 2018-11-27

## 2023-09-18 PROBLEM — M79.7 FIBROMYALGIA: Status: ACTIVE | Noted: 2019-02-15

## 2023-09-18 PROBLEM — R14.0 ABDOMINAL DISTENSION, GASEOUS: Status: ACTIVE | Noted: 2022-06-29

## 2023-09-18 PROBLEM — M47.819 UNDIFFERENTIATED SPONDYLOARTHROPATHY: Status: ACTIVE | Noted: 2019-02-15

## 2023-09-18 PROBLEM — K44.9 DIAPHRAGMATIC HERNIA: Status: ACTIVE | Noted: 2022-12-13

## 2023-09-18 PROBLEM — E66.01 MORBID (SEVERE) OBESITY DUE TO EXCESS CALORIES (H): Status: RESOLVED | Noted: 2021-09-03 | Resolved: 2023-09-18

## 2023-09-18 PROBLEM — D89.40 MAST CELL ACTIVATION SYNDROME (H): Status: ACTIVE | Noted: 2022-05-30

## 2023-09-18 PROBLEM — K57.30 DIVERTICULAR DISEASE OF LARGE INTESTINE: Status: ACTIVE | Noted: 2021-06-14

## 2023-09-18 PROBLEM — L70.0 CYSTIC ACNE: Status: RESOLVED | Noted: 2019-07-26 | Resolved: 2023-09-18

## 2023-09-18 PROBLEM — R07.9 CHEST PAIN: Status: ACTIVE | Noted: 2022-12-13

## 2023-09-18 PROBLEM — R19.8 IRREGULAR BOWEL HABITS: Status: ACTIVE | Noted: 2023-09-18

## 2023-09-18 PROBLEM — S32.059A CLOSED FRACTURE OF FIFTH LUMBAR VERTEBRA (H): Status: ACTIVE | Noted: 2018-02-03

## 2023-09-18 PROBLEM — L50.8 URTICARIA, CHRONIC: Status: ACTIVE | Noted: 2022-05-01

## 2023-09-18 PROBLEM — K64.9 HEMORRHOIDS: Status: ACTIVE | Noted: 2021-06-14

## 2023-09-18 PROBLEM — S73.192A TEAR OF LEFT ACETABULAR LABRUM: Status: ACTIVE | Noted: 2018-11-27

## 2023-09-18 PROBLEM — I89.0 LYMPHEDEMA: Status: ACTIVE | Noted: 2017-12-08

## 2023-09-18 RX ORDER — CROMOLYN SODIUM 100 MG/5ML
SOLUTION, CONCENTRATE ORAL
COMMUNITY
Start: 2023-05-04 | End: 2024-08-12

## 2023-09-18 RX ORDER — TRIAMCINOLONE ACETONIDE 55 UG/1
1 SPRAY, METERED NASAL EVERY 24 HOURS
COMMUNITY
Start: 2022-12-13

## 2023-09-18 RX ORDER — OMALIZUMAB 150 MG/ML
INJECTION, SOLUTION SUBCUTANEOUS
COMMUNITY
Start: 2023-09-04

## 2023-09-18 RX ORDER — EPINEPHRINE 0.3 MG/.3ML
INJECTION SUBCUTANEOUS
COMMUNITY
Start: 2023-05-05

## 2023-09-18 NOTE — PROGRESS NOTES
Damaris is a 48 year old  female who presents for annual exam.     Besides routine health maintenance, she has no other health concerns today .    HPI:  The patient's PCP is  Physician No Ref-Primary.  Fasting labs    Pt presents for her annual exam.     Gynecologically, she noted an increase in mild itching of her labia. Didn't complain of it on her own but when asked during exam b/c of findings then did mention she has some externally, on/off. Not severe, no discharge or burning but has noticed it. Though it was just part of her urticaria/mast cell activation syndrome.     Was seeing a POTS specialist [facility] so has appointment with his replacement in a few months. Allergist also dx'd her with mast cell activation so  gets weekly xolara infusionsIand this has made a huge impact and improvement on her urticaria. Not completely gone but dramatically better. Can at least leave the house w/o having immediate hives and this has allowed her to get outside and start walking more and being more active.      New specialist is Miquel Presley and will be seeing her . Dr. Presley has a POTS podcast, who has drawn a relationship between endo and POTS as pts sister has endo and she has followed in a lot of her sister's footsteps medically speaking.       CORBY CHAVES     When she was getting a full work up at [place] she had a breast U/S completed on the left and it was normal but does have her mammo today.    Patient herself is not having any dysmenorrhea that is persistent, continuous, worsening to signify endo dx. Will have occasionally a bad month of cramps but for a day, sometimes takes nsaids which help and sometimes doesn't even need to. Will have some flaring of her IBS with menses on occasion but not consistently. Overall IBS is much better with daily miralax to avoid constipation b/c then doesn't swing the other way and get diarrhea.Pt vaguely recalls a transvaginal U/S being completed with another  provider and it was normal. Can see one from 10/17 and it had nothing suspicious for endo at that time.    Patient is not a candidate for ocps b/c she still has a partially compressing rib on the left to her subclavian, which was the cause of her left DVT. However surgery couldn't excise the full rib so still has compression if lifts her left arm up, and also had some other mild hypercoag issues, so not optimal for any hormones.    Periods are very regular after years of irregular/PCOS. Were much heavier last year and feels that changed after her covid vaxx. Took about 7 months after that but now they are much better and shorter. 1 day, maybe half of a second can be heavy but now max of 5 days. Used to be 7-8 with 2 very heavy. So much more manageable.      Has known spondylolisthesis L4/L5 with back pain and hip pain, says that pain is doing better overall after had an epidural-cortisone shot recently which has given her relief from stiffness.          GYNECOLOGIC HISTORY:    Patient's last menstrual period was 09/20/2023 (exact date).    Regular menses? yes  Menses every 28 days.  Length of menses: 3-7 days    Her current contraception method is: none.  She  reports that she has never smoked. She has never used smokeless tobacco.    Patient is sexually active.  STD testing offered?  Declined  Last PHQ-9 score on record =       9/20/2023     9:26 AM   PHQ-9 SCORE   PHQ-9 Total Score 0     Last GAD7 score on record =       9/20/2023     9:26 AM   KAL-7 SCORE   Total Score 0     Alcohol Score = 0    HEALTH MAINTENANCE:  Cholesterol:   Recent Labs   Lab Test 08/15/22  1027 09/03/21  1204   CHOL 208* 233*   HDL 63 70   * 137*   TRIG 123 128     Last Mammo: One year ago, Result: Normal, Next Mammo: Today   Pap:   Lab Results   Component Value Date    PAP NIL 08/19/2020    PAP NIL 12/16/2015    PAP NIL 01/21/2009     Colonoscopy:  06/14/21, Result: Normal, Next Colonoscopy: 10 years.  Dexa:  2016? [facility]      Health maintenance updated:  Yes    HISTORY:  OB History    Para Term  AB Living   0 0 0 0 0 0   SAB IAB Ectopic Multiple Live Births   0 0 0 0 0       Patient Active Problem List   Diagnosis     GERD (gastroesophageal reflux disease)     Venous thoracic outlet syndrome of right subclavian vein     History of DVT (deep vein thrombosis)     Menorrhagia with regular cycle     PCOS (polycystic ovarian syndrome)     POTS (postural orthostatic tachycardia syndrome)     Spondylolisthesis at L4-L5 level     Arthralgia, unspecified joint     Dysmenorrhea     Alternating constipation and diarrhea     Thoracic outlet syndrome associated with cervical rib     Urticaria, chronic     Undifferentiated spondyloarthropathy     Tendinopathy of left gluteus medius     Tear of left acetabular labrum     Sprain of hip     Polyp of duodenum     Obesity with body mass index 30 or greater     Mast cell activation syndrome (H)     Lymphedema     Irregular bowel habits     Increased immunoglobulin     Hemorrhoids     Heartburn     Fibromyalgia     Diverticular disease of large intestine     Diaphragmatic hernia     Closed fracture of fifth lumbar vertebra (H)     Chest pain     Abdominal distension, gaseous     Abdominal bloating     Past Surgical History:   Procedure Laterality Date     ENDOSCOPIC SINUS SURGERY  2014    Procedure: ENDOSCOPIC SINUS SURGERY;  Surgeon: Suzi Vieyra MD;  Location: [facility]     EP STUDY TILT TABLE N/A 2021    Procedure: EP TILT TABLE;  Surgeon: Ilya Gilmore MD;  Location:  [facility]     ORTHOPEDIC SURGERY       TURBINECTOMY  2014    Procedure: TURBINECTOMY;  Surgeon: Suzi Vieyra MD;  Location: [facility] NONSPECIFIC PROCEDURE  2000    resection of first rib     ZZC NONSPECIFIC PROCEDURE      left foot surgery      Social History     Tobacco Use     Smoking status: Never     Smokeless tobacco: Never   Substance Use Topics     Alcohol  use: Not Currently     Comment: rare      Problem (# of Occurrences) Relation (Name,Age of Onset)    Allergies (1) Sister    Cancer (1) Maternal Grandmother: lung    Musculoskeletal Disorder (2) Father: Paget's disease , Paternal Uncle: Paget's disease    Osteoporosis (1) Mother    Cerebrovascular Disease (2) Sister (47): stroke after recent ocp start for perimenopausal sx, Paternal Cousin (44)    EYE* (1) Maternal Grandfather: glaucoma    Breast Cancer (1) Paternal Cousin (55): Second cousin    Cancer - colorectal (1) Paternal Grandfather    Prostate Cancer (1) Paternal Grandfather    Hyperlipidemia (1) Father    Other - See Comments (1) Sister: endometriosis            Current Outpatient Medications   Medication Sig     acetaminophen (TYLENOL) 650 MG CR tablet      BABY ASPIRIN PO      budesonide (RINOCORT AQUA) 32 MCG/ACT nasal spray Spray 1 spray into both nostrils daily     CLARITIN 10 MG OR TABS 1 TABLET DAILY     cromolyn (GASTROCROM) 100 MG/5ML (HIGH CONC) solution 30 MINUTES BEFORE MEALS FOUR TIMES DAILY X 14 DAYS     EPINEPHrine (ANY BX GENERIC EQUIV) 0.3 MG/0.3ML injection 2-pack INJECT 1 PEN IN THE MUSCLE ONE TIME AS DIRECTED     fluocinonide (LIDEX) 0.05 % external ointment      ketorolac tromethamine (ACULAR-LS) 0.4 % SOLN ophthalmic solution      ketotifen (ZADITOR/REFRESH ANTI-ITCH) 0.025 % SOLN ophthalmic solution 1 drop     LANsoprazole (PREVACID) 15 MG DR capsule      levocetirizine (XYZAL) 5 MG tablet Take 5 mg by mouth every evening     Lidocaine (LIDOCARE) 4 % Patch      methylcellulose (CITRUCEL) 500 MG TABS tablet Take 100 mg by mouth daily     mupirocin (BACTROBAN) 2 % ointment PIPER A SMALL AMOUNT TO NOSTRILS ONCE OR BID     polyethylene glycol (MIRALAX) 17 GM/Dose powder Take 1 capful by mouth daily     pseudoePHEDrine (SUDAFED) 120 MG 12 hr tablet Take by mouth every 24 hours     SODIUM CHLORIDE-SODIUM BICARB NA      tacrolimus (PROTOPIC) 0.1 % external ointment      triamcinolone (KENALOG)  "0.1 % external ointment Apply topically 2 times daily for 7 days     triamcinolone (NASACORT ALLERGY 24HR) 55 MCG/ACT nasal aerosol Spray 1 Units in nostril every 24 hours     UNABLE TO FIND MEDICATION NAME: Salt Stick Vitassium     vitamin B-12 (CYANOCOBALAMIN) 1000 MCG tablet      vitamin D3 (CHOLECALCIFEROL) 2000 units (50 mcg) tablet Take 1 tablet by mouth     XOLAIR 150 MG/ML SOSY injection      No current facility-administered medications for this visit.     Allergies   Allergen Reactions     Adhesive Tape Hives     Cat Hair Extract Hives     Dogs Hives     wheezing     Dust Mites      Other reaction(s): Wheezing  eye mucous     Erythromycin      Gluten Meal      Other reaction(s): GI intolerance     Grass Hives     eye mucous     No Clinical Screening - See Comments Hives, Itching and Photosensitivity     Oxycodone      Other reaction(s): Nausea, cramps, dizzyness     Pollen Extract      Other reaction(s): Other (see comments)  Tree pollen-hives, eye mucous     Ragweeds Hives     eye mucous     Trees      Other reaction(s): Other (see comments)  Sinus issues     Wheat Bran Hives     Other reaction(s): GI intolerance       Past medical, surgical, social and family histories were reviewed and updated in [facility].    EXAM:  /70   Ht 1.588 m (5' 2.5\")   Wt 106.6 kg (235 lb)   LMP 09/20/2023 (Exact Date)   Breastfeeding No   BMI 42.30 kg/m     BMI: Body mass index is 42.3 kg/m .    PHYSICAL EXAM:  Constitutional:   Appearance: Well nourished, well developed, alert, in no acute distress  Neck:  Lymph Nodes:  No lymphadenopathy present    Thyroid:  Gland size normal, nontender, no nodules or masses present  on palpation  Chest:  Respiratory Effort:  Breathing unlabored, CTAB  Cardiovascular:    Heart: Auscultation:  Regular rate, normal rhythm, no murmurs present  Breasts: Inspection of Breasts:  No lymphadenopathy present., Palpation of Breasts and Axillae:  No masses present on palpation, no breast " tenderness., Axillary Lymph Nodes:  No lymphadenopathy present., and No nodularity, asymmetry or nipple discharge bilaterally.  Gastrointestinal:   Abdominal Examination:  Abdomen nontender to palpation, tone normal without rigidity or guarding, no masses present, umbilicus without lesions   Liver and Spleen:  No hepatomegaly present, liver nontender to palpation    Hernias:  No hernias present  Lymphatic: Lymph Nodes:  No other lymphadenopathy present  Skin:  General Inspection:  No rashes present, no lesions present, no areas of  discoloration  Neurologic:    Mental Status:  Oriented X3.  Normal strength and tone, sensory exam                grossly normal, mentation intact and speech normal.    Psychiatric:   Mentation appears normal and affect normal/bright.         Pelvic Exam:  External Genitalia:     Mostly Normal appearance for age, no discharge present, no tenderness present, AT THE BOTTOM OF BILATERAL LABIA MAJORA AT THE JUNCTION TO THE INNER THIGH/GROIN THERE IS THE SLIGHTEST HINT OF A SILVER/HYPOPIGMENTED LINEAR COLOR CHANGE THAT IS THE AREA OF SUSPICION FOR MILD/EARLY L.S VS JUST VULVAR DERMATITIS NOS  Vagina:     Normal vaginal vault without central or paravaginal defects, no discharge present, no inflammatory lesions present, no masses present  Bladder:     Nontender to palpation  Urethra:   Urethral Body:  Urethra palpation normal, urethra structural support normal   Urethral Meatus:  No erythema or lesions present  Cervix:     Appearance healthy, no lesions present, nontender to palpation, no bleeding present  Uterus:     Uterus: firm, normal sized and nontender, anteverted in position.   Adnexa:     No adnexal tenderness present, no adnexal masses present  Perineum:     Perineum within normal limits, no evidence of trauma, no rashes or skin lesions present  Anus:     Anus within normal limits, no hemorrhoids present  Inguinal Lymph Nodes:     No lymphadenopathy present  Pubic Hair:     Normal pubic  hair distribution for age  Genitalia and Groin:     No rashes present, no lesions present, no areas of discoloration, no masses present    COUNSELING:   Reviewed preventive health counseling, as reflected in patient instructions    BMI: Body mass index is 42.3 kg/m .  Weight management plan: Patient was referred to their PCP to discuss a diet and exercise plan.    ASSESSMENT:  48 year old female with satisfactory annual exam.    ICD-10-CM    1. Encounter for gynecological examination without abnormal finding  Z01.419       2. Mast cell activation syndrome (H)  D89.40       3. POTS (postural orthostatic tachycardia syndrome)  G90.A       4. Alternating constipation and diarrhea  R19.8       5. Vulvar itching  L29.2 triamcinolone (KENALOG) 0.1 % external ointment      6. Venous thoracic outlet syndrome of right subclavian vein  I87.1       7. Screening for thyroid disorder  Z13.29 TSH with free T4 reflex     TSH with free T4 reflex      8. Screening for metabolic disorder  Z13.228 Comprehensive metabolic panel     CBC with platelets     Comprehensive metabolic panel     CBC with platelets      9. Encounter for lipid screening for cardiovascular disease  Z13.220 Lipid panel reflex to direct LDL Fasting    Z13.6 Lipid panel reflex to direct LDL Fasting      10. Screening for diabetes mellitus  Z13.1 Hemoglobin A1c     Hemoglobin A1c      11. Encounter for vitamin deficiency screening  Z13.21 Vitamin D Deficiency     Vitamin D Deficiency      12. Encounter for hepatitis C screening test for low risk patient  Z11.59 Hepatitis C Screen Reflex to HCV RNA Quant and Genotype     Hepatitis C Screen Reflex to HCV RNA Quant and Genotype      13. Screening for HIV (human immunodeficiency virus)  Z11.4 HIV Antigen Antibody Combo Cascade      14. Immunity status testing  Z01.84 Hepatitis B Surface Antibody     Hepatitis B Surface Antibody          PLAN:  Pap is UTD for 2 more years but will research xolair to see if significant  immune suppression.  ADDENDUM-xolair is just IgE mediated immune effect so not immune suppressing in terms of needing more frequent paps and can do q5 yrs barring other changes.     If so then may consider q3-4 yr paps vs q5 though o/w , monogamous, non smoker, w/o h.o dysplasia   Can follow routine ASCCP guidelines     mammo today.    C.S UTD for 8 more years.     Fasting labs today.  Will address any abnormalities once results are back.  Also due for one or both, Hep C/HIV screening so will do that at the same time.   However she does have a PCP so if anything is abnormal or needing to be followed up on and managed long term, will have her f/up on that with her PCP      Briefly discussed the potential for very mild L.S or just a non specific dermatitis of the vulva.  Discussed routine hygiene practices and the physiology of this in someone that has mast cell activation and likely some degree of over immune response  Rx for Triamcinolone sent in and can use it BID for 7-10 days on the labia and then prn for flare ups  If more significant sx or worsening, then will need to reassess.      Pts menses are lighter and shorter and no v.m sx at this point, nor sx of endo that would necessitate U/S repeat much less L/S  If that were to change then would consider it.  If needed menstrual control could consider mirena or more so kyleena given nulliparity  If needs HRT in future, and contraindicated, then could consider non hormonal options    10 minutes in addition to the routine annual preventative exam,  were spent on direct management of the patient's other medical issues as above as well as chart review including: imaging, lab work, previous visit notes by this provider, and other provider notes, as well as chart completion on the same DOS      Abby Mendoza MD  Answers submitted by the patient for this visit:  Annual Preventive Visit (Submitted on 9/16/2023)  Chief Complaint: Annual Exam:  Frequency of  exercise:: 4-5 days/week  Getting at least 3 servings of Calcium per day:: Yes  Diet:: Gluten-free/reduced  Taking medications regularly:: Yes  Medication side effects:: Other  Bi-annual eye exam:: Yes  Dental care twice a year:: Yes  Sleep apnea or symptoms of sleep apnea:: None  abdominal pain: Yes  Blood in stool: No  Blood in urine: No  chest pain: No  chills: No  congestion: No  constipation: Yes  cough: No  diarrhea: Yes  dizziness: Yes  ear pain: No  nervous/anxious: No  fever: No  frequency: No  genital sores: No  headaches: No  hearing loss: No  heartburn: Yes  arthralgias: No  joint swelling: No  peripheral edema: No  mood changes: No  myalgias: No  nausea: No  dysuria: No  palpitations: No  Skin sensation changes: No  sore throat: No  urgency: No  rash: No  shortness of breath: No  visual disturbance: No  weakness: No  pelvic pain: No  vaginal bleeding: No  vaginal discharge: No  tenderness: No  breast mass: No  breast discharge: No  Additional concerns today:: No  Exercise outside of work (Submitted on 9/16/2023)  Chief Complaint: Annual Exam:  Duration of exercise:: 30-45 minutes

## 2023-09-20 ENCOUNTER — HOSPITAL ENCOUNTER (OUTPATIENT)
Dept: MAMMOGRAPHY | Facility: CLINIC | Age: 48
Discharge: HOME | End: 2023-09-20
Admitting: OBSTETRICS & GYNECOLOGY
Payer: COMMERCIAL

## 2023-09-20 ENCOUNTER — OFFICE VISIT (OUTPATIENT)
Dept: OBGYN | Facility: CLINIC | Age: 48
End: 2023-09-20
Payer: COMMERCIAL

## 2023-09-20 ENCOUNTER — ANCILLARY ORDERS (OUTPATIENT)
Dept: OBGYN | Facility: CLINIC | Age: 48
End: 2023-09-20

## 2023-09-20 VITALS
SYSTOLIC BLOOD PRESSURE: 126 MMHG | BODY MASS INDEX: 41.64 KG/M2 | HEIGHT: 63 IN | WEIGHT: 235 LBS | DIASTOLIC BLOOD PRESSURE: 70 MMHG

## 2023-09-20 DIAGNOSIS — Z01.84 IMMUNITY STATUS TESTING: ICD-10-CM

## 2023-09-20 DIAGNOSIS — I87.1 VENOUS THORACIC OUTLET SYNDROME OF RIGHT SUBCLAVIAN VEIN: ICD-10-CM

## 2023-09-20 DIAGNOSIS — R19.8 ALTERNATING CONSTIPATION AND DIARRHEA: ICD-10-CM

## 2023-09-20 DIAGNOSIS — Z11.4 SCREENING FOR HIV (HUMAN IMMUNODEFICIENCY VIRUS): ICD-10-CM

## 2023-09-20 DIAGNOSIS — Z13.1 SCREENING FOR DIABETES MELLITUS: ICD-10-CM

## 2023-09-20 DIAGNOSIS — G90.A POTS (POSTURAL ORTHOSTATIC TACHYCARDIA SYNDROME): ICD-10-CM

## 2023-09-20 DIAGNOSIS — Z13.21 ENCOUNTER FOR VITAMIN DEFICIENCY SCREENING: ICD-10-CM

## 2023-09-20 DIAGNOSIS — Z13.220 ENCOUNTER FOR LIPID SCREENING FOR CARDIOVASCULAR DISEASE: ICD-10-CM

## 2023-09-20 DIAGNOSIS — Z13.29 SCREENING FOR THYROID DISORDER: ICD-10-CM

## 2023-09-20 DIAGNOSIS — Z12.31 VISIT FOR SCREENING MAMMOGRAM: ICD-10-CM

## 2023-09-20 DIAGNOSIS — Z11.59 ENCOUNTER FOR HEPATITIS C SCREENING TEST FOR LOW RISK PATIENT: ICD-10-CM

## 2023-09-20 DIAGNOSIS — L29.2 VULVAR ITCHING: ICD-10-CM

## 2023-09-20 DIAGNOSIS — Z13.6 ENCOUNTER FOR LIPID SCREENING FOR CARDIOVASCULAR DISEASE: ICD-10-CM

## 2023-09-20 DIAGNOSIS — Z01.419 ENCOUNTER FOR GYNECOLOGICAL EXAMINATION WITHOUT ABNORMAL FINDING: Primary | ICD-10-CM

## 2023-09-20 DIAGNOSIS — D89.40 MAST CELL ACTIVATION SYNDROME (H): ICD-10-CM

## 2023-09-20 DIAGNOSIS — Z13.228 SCREENING FOR METABOLIC DISORDER: ICD-10-CM

## 2023-09-20 LAB
ALBUMIN SERPL BCG-MCNC: 4 G/DL (ref 3.5–5.2)
ALP SERPL-CCNC: 67 U/L (ref 35–104)
ALT SERPL W P-5'-P-CCNC: 15 U/L (ref 0–50)
ANION GAP SERPL CALCULATED.3IONS-SCNC: 12 MMOL/L (ref 7–15)
AST SERPL W P-5'-P-CCNC: 23 U/L (ref 0–45)
BILIRUB SERPL-MCNC: 0.3 MG/DL
BUN SERPL-MCNC: 9.1 MG/DL (ref 6–20)
CALCIUM SERPL-MCNC: 9.3 MG/DL (ref 8.6–10)
CHLORIDE SERPL-SCNC: 104 MMOL/L (ref 98–107)
CHOLEST SERPL-MCNC: 218 MG/DL
CREAT SERPL-MCNC: 0.69 MG/DL (ref 0.51–0.95)
DEPRECATED HCO3 PLAS-SCNC: 25 MMOL/L (ref 22–29)
EGFRCR SERPLBLD CKD-EPI 2021: >90 ML/MIN/1.73M2
ERYTHROCYTE [DISTWIDTH] IN BLOOD BY AUTOMATED COUNT: 14.5 % (ref 10–15)
GLUCOSE SERPL-MCNC: 86 MG/DL (ref 70–99)
HBA1C MFR BLD: 5.7 % (ref 0–5.6)
HCT VFR BLD AUTO: 36.1 % (ref 35–47)
HDLC SERPL-MCNC: 54 MG/DL
HGB BLD-MCNC: 11.8 G/DL (ref 11.7–15.7)
LDLC SERPL CALC-MCNC: 129 MG/DL
MCH RBC QN AUTO: 28.1 PG (ref 26.5–33)
MCHC RBC AUTO-ENTMCNC: 32.7 G/DL (ref 31.5–36.5)
MCV RBC AUTO: 86 FL (ref 78–100)
NONHDLC SERPL-MCNC: 164 MG/DL
PLATELET # BLD AUTO: 435 10E3/UL (ref 150–450)
POTASSIUM SERPL-SCNC: 4.5 MMOL/L (ref 3.4–5.3)
PROT SERPL-MCNC: 7.3 G/DL (ref 6.4–8.3)
RBC # BLD AUTO: 4.2 10E6/UL (ref 3.8–5.2)
SODIUM SERPL-SCNC: 141 MMOL/L (ref 136–145)
TRIGL SERPL-MCNC: 173 MG/DL
TSH SERPL DL<=0.005 MIU/L-ACNC: 1.85 UIU/ML (ref 0.3–4.2)
WBC # BLD AUTO: 9.6 10E3/UL (ref 4–11)

## 2023-09-20 PROCEDURE — 84443 ASSAY THYROID STIM HORMONE: CPT | Performed by: OBSTETRICS & GYNECOLOGY

## 2023-09-20 PROCEDURE — 85027 COMPLETE CBC AUTOMATED: CPT | Performed by: OBSTETRICS & GYNECOLOGY

## 2023-09-20 PROCEDURE — 82306 VITAMIN D 25 HYDROXY: CPT | Performed by: OBSTETRICS & GYNECOLOGY

## 2023-09-20 PROCEDURE — 36415 COLL VENOUS BLD VENIPUNCTURE: CPT | Performed by: OBSTETRICS & GYNECOLOGY

## 2023-09-20 PROCEDURE — 77067 SCR MAMMO BI INCL CAD: CPT

## 2023-09-20 PROCEDURE — 99396 PREV VISIT EST AGE 40-64: CPT | Performed by: OBSTETRICS & GYNECOLOGY

## 2023-09-20 PROCEDURE — 86706 HEP B SURFACE ANTIBODY: CPT | Performed by: OBSTETRICS & GYNECOLOGY

## 2023-09-20 PROCEDURE — 80061 LIPID PANEL: CPT | Performed by: OBSTETRICS & GYNECOLOGY

## 2023-09-20 PROCEDURE — 83036 HEMOGLOBIN GLYCOSYLATED A1C: CPT | Performed by: OBSTETRICS & GYNECOLOGY

## 2023-09-20 PROCEDURE — 86803 HEPATITIS C AB TEST: CPT | Performed by: OBSTETRICS & GYNECOLOGY

## 2023-09-20 PROCEDURE — 87389 HIV-1 AG W/HIV-1&-2 AB AG IA: CPT | Performed by: OBSTETRICS & GYNECOLOGY

## 2023-09-20 PROCEDURE — 99213 OFFICE O/P EST LOW 20 MIN: CPT | Mod: 25 | Performed by: OBSTETRICS & GYNECOLOGY

## 2023-09-20 PROCEDURE — 80053 COMPREHEN METABOLIC PANEL: CPT | Performed by: OBSTETRICS & GYNECOLOGY

## 2023-09-20 RX ORDER — TRIAMCINOLONE ACETONIDE 1 MG/G
OINTMENT TOPICAL 2 TIMES DAILY
Qty: 60 G | Refills: 1 | Status: SHIPPED | OUTPATIENT
Start: 2023-09-20 | End: 2023-09-27

## 2023-09-20 ASSESSMENT — ANXIETY QUESTIONNAIRES
6. BECOMING EASILY ANNOYED OR IRRITABLE: NOT AT ALL
GAD7 TOTAL SCORE: 0
1. FEELING NERVOUS, ANXIOUS, OR ON EDGE: NOT AT ALL
IF YOU CHECKED OFF ANY PROBLEMS ON THIS QUESTIONNAIRE, HOW DIFFICULT HAVE THESE PROBLEMS MADE IT FOR YOU TO DO YOUR WORK, TAKE CARE OF THINGS AT HOME, OR GET ALONG WITH OTHER PEOPLE: NOT DIFFICULT AT ALL
5. BEING SO RESTLESS THAT IT IS HARD TO SIT STILL: NOT AT ALL
3. WORRYING TOO MUCH ABOUT DIFFERENT THINGS: NOT AT ALL
7. FEELING AFRAID AS IF SOMETHING AWFUL MIGHT HAPPEN: NOT AT ALL
2. NOT BEING ABLE TO STOP OR CONTROL WORRYING: NOT AT ALL
GAD7 TOTAL SCORE: 0

## 2023-09-20 ASSESSMENT — PATIENT HEALTH QUESTIONNAIRE - PHQ9
5. POOR APPETITE OR OVEREATING: NOT AT ALL
SUM OF ALL RESPONSES TO PHQ QUESTIONS 1-9: 0

## 2023-09-21 LAB
DEPRECATED CALCIDIOL+CALCIFEROL SERPL-MC: 54 UG/L (ref 20–75)
HBV SURFACE AB SERPL IA-ACNC: 0.23 M[IU]/ML
HBV SURFACE AB SERPL IA-ACNC: NONREACTIVE M[IU]/ML
HCV AB SERPL QL IA: NONREACTIVE
HIV 1+2 AB+HIV1 P24 AG SERPL QL IA: NONREACTIVE

## 2023-09-22 NOTE — RESULT ENCOUNTER NOTE
Damaris,    Your cholesterol is normal. The LDL, or bad cholesterol, should be under 130 and not 100 as shown (as long as you don't have any other heart disease risk factors like hypertension or diabetes), and your's is 129, which is fine. The triglycerides are just a little elevated but this is something that can be improved with even a few days of  eating and can fluctuate more rapidly based on recent fatty food intake or not. So hopefully as you're getting more active and starting to walk more, you can also try to just cut down on carbs/sweets/processed foods, and even a bit of weight loss and this can all be improved.    Other labs include your:    Metabolic panel which shows your blood sugar, electrolytes, kidney function, and liver enzymes, which are all normal.    Your hgb A1C, which is a test of how your overall blood sugars have been running over a 3 month period of time, is 5.7 which is elevated into a prediabetes range. The glucose itself is normal but overall this is showing that your sugars are running a bit higher than ideal. All of the info above, as it pertained to your cholesterol, applies here as well. I would make sure your primary care doctor is aware of these labs and can also help manage/follow this for you and give any appropriate recommendations.     Your thyroid is normal.    Your vitamin D level is 54 with an ideal range of 40-50. So whatever amount you're supplementing with seems just right, so I would continue with that.     Your complete blood count is normal. This shows that you are not anemic with a normal hemoglobin and that your white blood cell count and platelet count are normal as well.    The CDC recommends testing one time in adulthood for hepatitis C and HIV, so one/both of those were also done as needed,  and as expected are negative/normal.    You are showing not immune to hepatitis B. This is now a vaccine that is recommended for all people. We have Twinrix which is a  combo hep A and hep B vaccine, 2 shots about 2-6 months apart, and we carry it here in our office. You can make an appointment with us at any time to get this done if you're interested.     Abby Mendoza MD

## 2023-10-06 ENCOUNTER — THERAPY VISIT (OUTPATIENT)
Dept: OCCUPATIONAL THERAPY | Facility: CLINIC | Age: 48
End: 2023-10-06
Payer: COMMERCIAL

## 2023-10-06 DIAGNOSIS — I89.0 LYMPHEDEMA: ICD-10-CM

## 2023-10-06 DIAGNOSIS — I87.1 VENOUS THORACIC OUTLET SYNDROME OF RIGHT SUBCLAVIAN VEIN: ICD-10-CM

## 2023-10-06 DIAGNOSIS — I89.0 LYMPHEDEMA OF ARM: Primary | ICD-10-CM

## 2023-10-06 PROCEDURE — 97140 MANUAL THERAPY 1/> REGIONS: CPT | Mod: GO

## 2023-11-16 ENCOUNTER — TRANSFERRED RECORDS (OUTPATIENT)
Dept: HEALTH INFORMATION MANAGEMENT | Facility: CLINIC | Age: 48
End: 2023-11-16
Payer: COMMERCIAL

## 2023-11-22 ENCOUNTER — TRANSFERRED RECORDS (OUTPATIENT)
Dept: HEALTH INFORMATION MANAGEMENT | Facility: CLINIC | Age: 48
End: 2023-11-22
Payer: COMMERCIAL

## 2023-11-28 ENCOUNTER — THERAPY VISIT (OUTPATIENT)
Dept: OCCUPATIONAL THERAPY | Facility: CLINIC | Age: 48
End: 2023-11-28
Payer: COMMERCIAL

## 2023-11-28 DIAGNOSIS — I89.0 LYMPHEDEMA: ICD-10-CM

## 2023-11-28 DIAGNOSIS — Z86.718 HISTORY OF DVT (DEEP VEIN THROMBOSIS): ICD-10-CM

## 2023-11-28 DIAGNOSIS — I87.1 VENOUS THORACIC OUTLET SYNDROME OF RIGHT SUBCLAVIAN VEIN: ICD-10-CM

## 2023-11-28 DIAGNOSIS — I89.0 LYMPHEDEMA OF ARM: Primary | ICD-10-CM

## 2023-11-28 PROCEDURE — 97140 MANUAL THERAPY 1/> REGIONS: CPT | Mod: GO

## 2023-12-01 ENCOUNTER — IMMUNIZATION (OUTPATIENT)
Dept: FAMILY MEDICINE | Facility: CLINIC | Age: 48
End: 2023-12-01
Payer: COMMERCIAL

## 2023-12-01 DIAGNOSIS — Z23 ENCOUNTER FOR IMMUNIZATION: Primary | ICD-10-CM

## 2023-12-01 PROCEDURE — 90746 HEPB VACCINE 3 DOSE ADULT IM: CPT

## 2023-12-01 PROCEDURE — 90472 IMMUNIZATION ADMIN EACH ADD: CPT

## 2023-12-01 PROCEDURE — 90471 IMMUNIZATION ADMIN: CPT

## 2023-12-01 PROCEDURE — 99207 PR NO CHARGE NURSE ONLY: CPT

## 2023-12-01 PROCEDURE — 90686 IIV4 VACC NO PRSV 0.5 ML IM: CPT

## 2023-12-01 NOTE — PROGRESS NOTES
Prior to immunization administration, verified patients identity using patient s name and date of birth. Please see Immunization Activity for additional information.     Screening Questionnaire for Adult Immunization    Are you sick today?   No   Do you have allergies to medications, food, a vaccine component or latex?   No   Have you ever had a serious reaction after receiving a vaccination?   No   Do you have a long-term health problem with heart, lung, kidney, or metabolic disease (e.g., diabetes), asthma, a blood disorder, no spleen, complement component deficiency, a cochlear implant, or a spinal fluid leak?  Are you on long-term aspirin therapy?   No   Do you have cancer, leukemia, HIV/AIDS, or any other immune system problem?   No   Do you have a parent, brother, or sister with an immune system problem?   No   In the past 3 months, have you taken medications that affect  your immune system, such as prednisone, other steroids, or anticancer drugs; drugs for the treatment of rheumatoid arthritis, Crohn s disease, or psoriasis; or have you had radiation treatments?   No   Have you had a seizure, or a brain or other nervous system problem?   No   During the past year, have you received a transfusion of blood or blood    products, or been given immune (gamma) globulin or antiviral drug?   No   For women: Are you pregnant or is there a chance you could become       pregnant during the next month?   No   Have you received any vaccinations in the past 4 weeks?   No     Immunization questionnaire answers were all negative.    I have reviewed the following standing orders:   This patient is due and qualifies for the Hepatitis B vaccine.    Click here for Hepatitis B Standing Order    I have reviewed the vaccines inclusion and exclusion criteria; No concerns regarding eligibility.         This patient is due and qualifies for the Influenza vaccine.    Click here for Influenza Vaccine Standing Order    I have reviewed  the vaccines inclusion and exclusion criteria; No concerns regarding eligibility.     Patient instructed to remain in clinic for 15 minutes afterwards, and to report any adverse reactions.     Screening performed by Lisa Howell MA on 12/1/2023 at 9:14 AM.

## 2023-12-07 ENCOUNTER — TRANSFERRED RECORDS (OUTPATIENT)
Dept: HEALTH INFORMATION MANAGEMENT | Facility: CLINIC | Age: 48
End: 2023-12-07
Payer: COMMERCIAL

## 2023-12-15 ENCOUNTER — THERAPY VISIT (OUTPATIENT)
Dept: OCCUPATIONAL THERAPY | Facility: CLINIC | Age: 48
End: 2023-12-15
Payer: COMMERCIAL

## 2023-12-15 DIAGNOSIS — I89.0 LYMPHEDEMA: ICD-10-CM

## 2023-12-15 DIAGNOSIS — I87.1 VENOUS THORACIC OUTLET SYNDROME OF RIGHT SUBCLAVIAN VEIN: ICD-10-CM

## 2023-12-15 DIAGNOSIS — Z86.718 HISTORY OF DVT (DEEP VEIN THROMBOSIS): ICD-10-CM

## 2023-12-15 DIAGNOSIS — I89.0 LYMPHEDEMA OF ARM: Primary | ICD-10-CM

## 2023-12-15 PROCEDURE — 97140 MANUAL THERAPY 1/> REGIONS: CPT | Mod: GO

## 2023-12-21 ENCOUNTER — TRANSFERRED RECORDS (OUTPATIENT)
Dept: HEALTH INFORMATION MANAGEMENT | Facility: CLINIC | Age: 48
End: 2023-12-21
Payer: COMMERCIAL

## 2023-12-28 ENCOUNTER — TRANSFERRED RECORDS (OUTPATIENT)
Dept: HEALTH INFORMATION MANAGEMENT | Facility: CLINIC | Age: 48
End: 2023-12-28
Payer: COMMERCIAL

## 2024-01-02 ENCOUNTER — ALLIED HEALTH/NURSE VISIT (OUTPATIENT)
Dept: FAMILY MEDICINE | Facility: CLINIC | Age: 49
End: 2024-01-02
Payer: COMMERCIAL

## 2024-01-02 DIAGNOSIS — Z23 ENCOUNTER FOR IMMUNIZATION: Primary | ICD-10-CM

## 2024-01-02 PROCEDURE — 99207 PR NO CHARGE NURSE ONLY: CPT

## 2024-01-02 PROCEDURE — 90471 IMMUNIZATION ADMIN: CPT

## 2024-01-02 PROCEDURE — 90746 HEPB VACCINE 3 DOSE ADULT IM: CPT

## 2024-01-02 NOTE — PROGRESS NOTES
Prior to immunization administration, verified patients identity using patient s name and date of birth. Please see Immunization Activity for additional information.     Screening Questionnaire for Adult Immunization    Are you sick today?   No   Do you have allergies to medications, food, a vaccine component or latex?   No   Have you ever had a serious reaction after receiving a vaccination?   No   Do you have a long-term health problem with heart, lung, kidney, or metabolic disease (e.g., diabetes), asthma, a blood disorder, no spleen, complement component deficiency, a cochlear implant, or a spinal fluid leak?  Are you on long-term aspirin therapy?   No   Do you have cancer, leukemia, HIV/AIDS, or any other immune system problem?   No   Do you have a parent, brother, or sister with an immune system problem?   No   In the past 3 months, have you taken medications that affect  your immune system, such as prednisone, other steroids, or anticancer drugs; drugs for the treatment of rheumatoid arthritis, Crohn s disease, or psoriasis; or have you had radiation treatments?   No   Have you had a seizure, or a brain or other nervous system problem?   No   During the past year, have you received a transfusion of blood or blood    products, or been given immune (gamma) globulin or antiviral drug?   No   For women: Are you pregnant or is there a chance you could become       pregnant during the next month?   No   Have you received any vaccinations in the past 4 weeks?   No     Immunization questionnaire answers were all negative.    I have reviewed the following standing orders:   This patient is due and qualifies for the Hepatitis B vaccine.    Click here for Hepatitis B Standing Order    I have reviewed the vaccines inclusion and exclusion criteria; No concerns regarding eligibility.     Patient instructed to remain in clinic for 15 minutes afterwards, and to report any adverse reactions.     Screening performed by Lisa  CHASE Howell on 1/2/2024 at 11:35 AM.

## 2024-01-04 ENCOUNTER — TRANSFERRED RECORDS (OUTPATIENT)
Dept: HEALTH INFORMATION MANAGEMENT | Facility: CLINIC | Age: 49
End: 2024-01-04
Payer: COMMERCIAL

## 2024-01-05 ENCOUNTER — THERAPY VISIT (OUTPATIENT)
Dept: OCCUPATIONAL THERAPY | Facility: CLINIC | Age: 49
End: 2024-01-05
Payer: COMMERCIAL

## 2024-01-05 DIAGNOSIS — Z86.718 HISTORY OF DVT (DEEP VEIN THROMBOSIS): ICD-10-CM

## 2024-01-05 DIAGNOSIS — I87.1 VENOUS THORACIC OUTLET SYNDROME OF RIGHT SUBCLAVIAN VEIN: ICD-10-CM

## 2024-01-05 DIAGNOSIS — I89.0 LYMPHEDEMA OF ARM: Primary | ICD-10-CM

## 2024-01-05 DIAGNOSIS — I89.0 LYMPHEDEMA: ICD-10-CM

## 2024-01-05 PROCEDURE — 97140 MANUAL THERAPY 1/> REGIONS: CPT | Mod: GO

## 2024-01-11 ENCOUNTER — TRANSFERRED RECORDS (OUTPATIENT)
Dept: HEALTH INFORMATION MANAGEMENT | Facility: CLINIC | Age: 49
End: 2024-01-11
Payer: COMMERCIAL

## 2024-01-16 ENCOUNTER — TRANSFERRED RECORDS (OUTPATIENT)
Dept: HEALTH INFORMATION MANAGEMENT | Facility: CLINIC | Age: 49
End: 2024-01-16
Payer: COMMERCIAL

## 2024-02-16 ENCOUNTER — TRANSFERRED RECORDS (OUTPATIENT)
Dept: HEALTH INFORMATION MANAGEMENT | Facility: CLINIC | Age: 49
End: 2024-02-16

## 2024-03-14 ASSESSMENT — ANXIETY QUESTIONNAIRES
3. WORRYING TOO MUCH ABOUT DIFFERENT THINGS: NOT AT ALL
4. TROUBLE RELAXING: NOT AT ALL
6. BECOMING EASILY ANNOYED OR IRRITABLE: NOT AT ALL
7. FEELING AFRAID AS IF SOMETHING AWFUL MIGHT HAPPEN: NOT AT ALL
8. IF YOU CHECKED OFF ANY PROBLEMS, HOW DIFFICULT HAVE THESE MADE IT FOR YOU TO DO YOUR WORK, TAKE CARE OF THINGS AT HOME, OR GET ALONG WITH OTHER PEOPLE?: NOT DIFFICULT AT ALL
1. FEELING NERVOUS, ANXIOUS, OR ON EDGE: NOT AT ALL
7. FEELING AFRAID AS IF SOMETHING AWFUL MIGHT HAPPEN: NOT AT ALL
IF YOU CHECKED OFF ANY PROBLEMS ON THIS QUESTIONNAIRE, HOW DIFFICULT HAVE THESE PROBLEMS MADE IT FOR YOU TO DO YOUR WORK, TAKE CARE OF THINGS AT HOME, OR GET ALONG WITH OTHER PEOPLE: NOT DIFFICULT AT ALL
GAD7 TOTAL SCORE: 0
GAD7 TOTAL SCORE: 0
2. NOT BEING ABLE TO STOP OR CONTROL WORRYING: NOT AT ALL
5. BEING SO RESTLESS THAT IT IS HARD TO SIT STILL: NOT AT ALL

## 2024-03-15 ENCOUNTER — THERAPY VISIT (OUTPATIENT)
Dept: OCCUPATIONAL THERAPY | Facility: CLINIC | Age: 49
End: 2024-03-15
Payer: COMMERCIAL

## 2024-03-15 DIAGNOSIS — I89.0 LYMPHEDEMA: Primary | ICD-10-CM

## 2024-03-15 PROCEDURE — 97140 MANUAL THERAPY 1/> REGIONS: CPT | Mod: GO

## 2024-03-20 ENCOUNTER — OFFICE VISIT (OUTPATIENT)
Dept: OBGYN | Facility: CLINIC | Age: 49
End: 2024-03-20
Attending: OBSTETRICS & GYNECOLOGY
Payer: COMMERCIAL

## 2024-03-20 VITALS
BODY MASS INDEX: 42.3 KG/M2 | SYSTOLIC BLOOD PRESSURE: 115 MMHG | DIASTOLIC BLOOD PRESSURE: 81 MMHG | HEART RATE: 92 BPM | HEIGHT: 63 IN

## 2024-03-20 DIAGNOSIS — N94.6 DYSMENORRHEA: Primary | ICD-10-CM

## 2024-03-20 PROCEDURE — 99214 OFFICE O/P EST MOD 30 MIN: CPT | Mod: GC | Performed by: OBSTETRICS & GYNECOLOGY

## 2024-03-20 PROCEDURE — 99213 OFFICE O/P EST LOW 20 MIN: CPT | Performed by: OBSTETRICS & GYNECOLOGY

## 2024-03-20 RX ORDER — FLUDROCORTISONE ACETATE 0.1 MG/1
TABLET ORAL
COMMUNITY
Start: 2024-03-10

## 2024-03-20 RX ORDER — CLOBETASOL PROPIONATE 0.5 MG/G
OINTMENT TOPICAL
COMMUNITY
Start: 2024-02-13

## 2024-03-20 RX ORDER — BUDESONIDE 3 MG/1
CAPSULE, COATED PELLETS ORAL
COMMUNITY
Start: 2024-01-20

## 2024-03-20 ASSESSMENT — PAIN SCALES - GENERAL: PAINLEVEL: NO PAIN (0)

## 2024-03-20 NOTE — LETTER
3/20/2024       RE: Damaris Sarmiento  [address] [facility] [address]     Dear Colleague,    Thank you for referring your patient, Damaris Sarmiento, to the [facility] [place] at [facility]. Please see a copy of my visit note below.    CC:  Consult from Dr Presley for hysterectomy due to suspected endometriosis    HPI:  Damaris is a    49 yo who presents due to concern for endometriosis and potential hysterectomy. She was clinically diagnosed as a kid by her family doctor. She has been experiencing significant pelvic cramps during her periods. She also notes severe limiting back pain, being unable to walk more than half a mile during her periods and resolving after rest. These symptoms also happen when she ovulates. The IBS and POTS symptoms are also exacerbated during the periods and her budesonide and fludrocortisone are less effective  Patient's last menstrual period was 2024 (exact date).  Menses are regular q 28-30 days, lasting 7 days, have been heavier over the last 9 months. She denies gynecological surgeries, denies abdominal surgeries, not on any estrogen contraceptives due to history of RUE DVT. She takes aspirin but is on no other blood thinners. Notes easy brusing but no overt bleeding. Most recent abdominal imaging with TV U/S in 2017 was normal.     Patients records are available and reviewed at today's visit.    Past GYN history:  No STD history  Last PAP smear:  Normal and HPV DNA test (-) in   Last TSH: 1.85, normal?    Past Medical History:   Diagnosis Date    Alternating constipation and diarrhea 2022    Asthma     Discoid lupus     Diverticulosis of large intestine 2021    FOUND SCREENING SCOPE, TRANSVERSE/DESCENDING    Dysmenorrhea 2022    GERD (gastroesophageal reflux disease) 10/04/2012    Menorrhagia with regular cycle 2019    Polycystic ovarian syndrome     POTS (postural orthostatic  tachycardia syndrome) 08/2021    RW ALLERGIES/IMMUNOLOGY (ABSTRACTED)     Sinusitis     Subclavian vein thrombosis, right (H) 2000    Thoracic outlet syndrome associated with cervical rib 8/20/2022       Past Surgical History:   Procedure Laterality Date    ENDOSCOPIC SINUS SURGERY  7/16/2014    Procedure: ENDOSCOPIC SINUS SURGERY;  Surgeon: Suzi Vieyra MD;  Location: [facility]    EP STUDY TILT TABLE N/A 8/20/2021    Procedure: EP TILT TABLE;  Surgeon: Ilya Gilmore MD;  Location:  HEART CARDIAC CATH LAB    ORTHOPEDIC SURGERY      TURBINECTOMY  7/16/2014    Procedure: TURBINECTOMY;  Surgeon: Suzi Vieyra MD;  Location: [facility]    ZZC NONSPECIFIC PROCEDURE  12/2000    resection of first rib    ZZC NONSPECIFIC PROCEDURE  1992    left foot surgery       Family History   Problem Relation Age of Onset    Osteoporosis Mother     Musculoskeletal Disorder Father         Paget's disease     Hyperlipidemia Father     Allergies Sister     Other - See Comments Sister         endometriosis    Cerebrovascular Disease Sister 47        stroke after recent ocp start for perimenopausal sx    Cancer Maternal Grandmother         lung    EYE* Maternal Grandfather         glaucoma    Cancer - colorectal Paternal Grandfather     Prostate Cancer Paternal Grandfather     Musculoskeletal Disorder Paternal Uncle         Paget's disease    Breast Cancer Paternal Cousin 55        Second cousin    Cerebrovascular Disease Paternal Cousin 44       Allergies: Adhesive tape, Cat hair extract, Dogs, Dust mites, Erythromycin, Gluten meal, Grass, No clinical screening - see comments, Oxycodone, Pollen extract, Ragweeds, Red dye, Trees, and Wheat    Current Outpatient Medications   Medication Sig Dispense Refill    acetaminophen (TYLENOL) 650 MG CR tablet       BABY ASPIRIN PO       budesonide (ENTOCORT EC) 3 MG EC capsule       budesonide (RINOCORT AQUA) 32 MCG/ACT nasal spray Spray 1 spray into both nostrils daily      CLARITIN 10  MG OR TABS 1 TABLET DAILY      clobetasol (TEMOVATE) 0.05 % external ointment APPLY TOPICALLY TO THE AFFECTED AREA TWICE DAILY FOR 14 DAYS AS NEEDED FOR FLARE      EPINEPHrine (ANY BX GENERIC EQUIV) 0.3 MG/0.3ML injection 2-pack INJECT 1 PEN IN THE MUSCLE ONE TIME AS DIRECTED      fludrocortisone (FLORINEF) 0.1 MG tablet       fluocinonide (LIDEX) 0.05 % external ointment       ketorolac tromethamine (ACULAR-LS) 0.4 % SOLN ophthalmic solution       ketotifen (ZADITOR/REFRESH ANTI-ITCH) 0.025 % SOLN ophthalmic solution 1 drop      LANsoprazole (PREVACID) 15 MG DR capsule       levocetirizine (XYZAL) 5 MG tablet Take 5 mg by mouth every evening      Lidocaine (LIDOCARE) 4 % Patch       mupirocin (BACTROBAN) 2 % ointment PIPER A SMALL AMOUNT TO NOSTRILS ONCE OR BID  0    polyethylene glycol (MIRALAX) 17 GM/Dose powder Take 1 capful by mouth daily      pseudoePHEDrine (SUDAFED) 120 MG 12 hr tablet Take by mouth every 24 hours      SODIUM CHLORIDE-SODIUM BICARB NA       tacrolimus (PROTOPIC) 0.1 % external ointment       triamcinolone (NASACORT ALLERGY 24HR) 55 MCG/ACT nasal aerosol Spray 1 Units in nostril every 24 hours      UNABLE TO FIND MEDICATION NAME: Salt Stick Vitassium      vitamin B-12 (CYANOCOBALAMIN) 1000 MCG tablet       vitamin D3 (CHOLECALCIFEROL) 2000 units (50 mcg) tablet Take 1 tablet by mouth      XOLAIR 150 MG/ML SOSY injection       cromolyn (GASTROCROM) 100 MG/5ML (HIGH CONC) solution 30 MINUTES BEFORE MEALS FOUR TIMES DAILY X 14 DAYS      methylcellulose (CITRUCEL) 500 MG TABS tablet Take 100 mg by mouth daily (Patient not taking: Reported on 3/20/2024)         ROS:  C: NEGATIVE for fever, chills, change in weight  I: NEGATIVE for worrisome rashes, moles or lesions  E: NEGATIVE for vision changes or irritation  E/M: NEGATIVE for ear, mouth and throat problems  R: NEGATIVE for significant cough or SOB  CV: NEGATIVE for chest pain, palpitations or peripheral edema  GI: NEGATIVE for nausea, abdominal  "pain, heartburn, or change in bowel habits  : NEGATIVE for frequency, dysuria, hematuria, vaginal discharge  M: NEGATIVE for significant arthralgias or myalgia  N: NEGATIVE for weakness, dizziness or paresthesias  E: NEGATIVE for temperature intolerance, skin/hair changes  P: NEGATIVE for changes in mood or affect    EXAM:  Blood pressure 115/81, pulse 92, height 1.588 m (5' 2.5\"), last menstrual period 2024, not currently breastfeeding.   BMI= Body mass index is 42.3 kg/m .  General - pleasant female in no acute distress.  Neck - supple without lymphadenopathy or thyromegaly.  Lungs - clear to auscultation bilaterally.  Heart - regular rate and rhythm without murmur.  Breast - no nodularity, asymmetry or nipple discharge bilaterally.  Abdomen - soft, nontender, nondistended, no hepatosplenomegaly.  Rectovaginal - deferred.  Musculoskeletal - no gross deformities.  Neurological - normal strength, sensation, and mental status.      ASSESSMENT/PLAN:  Damaris is a    49 yo with PMH s/f POTS, IBS, DVT and cervical rib resection who presents due to concern for endometriosis and potential hysterectomy.     Endometriosis  Dysmenorrhea  Patient clinically diagnosed with endometriosis, appears to have cyclic symptoms that are reproduced during her periods  and ovulation days, mostly manifesting as painful cramps and back pain. Discussed with the patient that due to her age, symptoms and comorbidities, hysterectomy would be the definitive management. The patient is agreeable to the procedure. For endometriosis, oophorectomy would be additionally indicated. We will proceed with Robot assisted laparoscopic exploration of the pelvis and removal of any visible endometriomas. Given medical complexity and risk for POTS and IBS symptom worsening after menopause and the limited medical management options for menopause symptoms, recommended discussion with Dr Presley regarding POTS and IBS progression, which the patient will " engage.   She will let us know of her decision on whether to remove the ovaries if no endometriosis is found- though she is heavily leaning toward BSO at same time.  - Imaging with TV U/S and CT A/P for operative planning  - Case request for robotic hysterectomy and laparoscopic intervention. We discussed surgical risks, including bleeding, infection, damage to nearby structures, need for transfusion, ongoing pain if caused by other source. Discussed routine post op care and possible discharge day of surgery vs next day d/t POTS.       Letter will be sent to the referring provider.    Patient seen and discussed with Dr Dunn    [facility] staff:  Appreciate note by Dr. Xavier.  I have seen and examined the patient with the resident. I have reviewed, edited, and agree with the note.      Joslyn Dunn MD, FACOG  3/20/2024  9:06 PM      Binh Xavier MD  Internal Medicine, PGY-3  [facility]  [phone]

## 2024-03-20 NOTE — PATIENT INSTRUCTIONS
Thank you for trusting us with your care!     If you need to contact us for questions about:  Symptoms, Scheduling & Medical Questions; Non-urgent (2-3 day response) Galo message, Urgent (needing response today) [phone] (if after 3:30pm next day response)   Prescriptions: Please call your Pharmacy   Billing: Vel[facility] [phone] or [facility]:[phone]

## 2024-03-20 NOTE — PROGRESS NOTES
CC:  Consult from Dr Presley for hysterectomy due to suspected endometriosis    HPI:  Damaris is a    49 yo who presents due to concern for endometriosis and potential hysterectomy. She was clinically diagnosed as a kid by her family doctor. She has been experiencing significant pelvic cramps during her periods. She also notes severe limiting back pain, being unable to walk more than half a mile during her periods and resolving after rest. These symptoms also happen when she ovulates. The IBS and POTS symptoms are also exacerbated during the periods and her budesonide and fludrocortisone are less effective  Patient's last menstrual period was 2024 (exact date).  Menses are regular q 28-30 days, lasting 7 days, have been heavier over the last 9 months. She denies gynecological surgeries, denies abdominal surgeries, not on any estrogen contraceptives due to history of RUE DVT. She takes aspirin but is on no other blood thinners. Notes easy brusing but no overt bleeding. Most recent abdominal imaging with TV U/S in 2017 was normal.     Patients records are available and reviewed at today's visit.    Past GYN history:  No STD history  Last PAP smear:  Normal and HPV DNA test (-) in   Last TSH: 1.85, normal?    Past Medical History:   Diagnosis Date    Alternating constipation and diarrhea 2022    Asthma     Discoid lupus     Diverticulosis of large intestine 2021    FOUND SCREENING SCOPE, TRANSVERSE/DESCENDING    Dysmenorrhea 2022    GERD (gastroesophageal reflux disease) 10/04/2012    Menorrhagia with regular cycle 2019    Polycystic ovarian syndrome     POTS (postural orthostatic tachycardia syndrome) 2021    RW ALLERGIES/IMMUNOLOGY (ABSTRACTED)     Sinusitis     Subclavian vein thrombosis, right (H)     Thoracic outlet syndrome associated with cervical rib 2022       Past Surgical History:   Procedure Laterality Date    ENDOSCOPIC SINUS SURGERY  2014    Procedure:  ENDOSCOPIC SINUS SURGERY;  Surgeon: Suzi Vieyra MD;  Location: [facility]    EP STUDY TILT TABLE N/A 8/20/2021    Procedure: EP TILT TABLE;  Surgeon: Ilya Gilmore MD;  Location:  HEART CARDIAC CATH LAB    ORTHOPEDIC SURGERY      TURBINECTOMY  7/16/2014    Procedure: TURBINECTOMY;  Surgeon: Suzi Vieyra MD;  Location: [facility]    ZZC NONSPECIFIC PROCEDURE  12/2000    resection of first rib    ZZ NONSPECIFIC PROCEDURE  1992    left foot surgery       Family History   Problem Relation Age of Onset    Osteoporosis Mother     Musculoskeletal Disorder Father         Paget's disease     Hyperlipidemia Father     Allergies Sister     Other - See Comments Sister         endometriosis    Cerebrovascular Disease Sister 47        stroke after recent ocp start for perimenopausal sx    Cancer Maternal Grandmother         lung    EYE* Maternal Grandfather         glaucoma    Cancer - colorectal Paternal Grandfather     Prostate Cancer Paternal Grandfather     Musculoskeletal Disorder Paternal Uncle         Paget's disease    Breast Cancer Paternal Cousin 55        Second cousin    Cerebrovascular Disease Paternal Cousin 44       Allergies: Adhesive tape, Cat hair extract, Dogs, Dust mites, Erythromycin, Gluten meal, Grass, No clinical screening - see comments, Oxycodone, Pollen extract, Ragweeds, Red dye, Trees, and Wheat    Current Outpatient Medications   Medication Sig Dispense Refill    acetaminophen (TYLENOL) 650 MG CR tablet       BABY ASPIRIN PO       budesonide (ENTOCORT EC) 3 MG EC capsule       budesonide (RINOCORT AQUA) 32 MCG/ACT nasal spray Spray 1 spray into both nostrils daily      CLARITIN 10 MG OR TABS 1 TABLET DAILY      clobetasol (TEMOVATE) 0.05 % external ointment APPLY TOPICALLY TO THE AFFECTED AREA TWICE DAILY FOR 14 DAYS AS NEEDED FOR FLARE      EPINEPHrine (ANY BX GENERIC EQUIV) 0.3 MG/0.3ML injection 2-pack INJECT 1 PEN IN THE MUSCLE ONE TIME AS DIRECTED      fludrocortisone (FLORINEF)  0.1 MG tablet       fluocinonide (LIDEX) 0.05 % external ointment       ketorolac tromethamine (ACULAR-LS) 0.4 % SOLN ophthalmic solution       ketotifen (ZADITOR/REFRESH ANTI-ITCH) 0.025 % SOLN ophthalmic solution 1 drop      LANsoprazole (PREVACID) 15 MG DR capsule       levocetirizine (XYZAL) 5 MG tablet Take 5 mg by mouth every evening      Lidocaine (LIDOCARE) 4 % Patch       mupirocin (BACTROBAN) 2 % ointment PIPER A SMALL AMOUNT TO NOSTRILS ONCE OR BID  0    polyethylene glycol (MIRALAX) 17 GM/Dose powder Take 1 capful by mouth daily      pseudoePHEDrine (SUDAFED) 120 MG 12 hr tablet Take by mouth every 24 hours      SODIUM CHLORIDE-SODIUM BICARB NA       tacrolimus (PROTOPIC) 0.1 % external ointment       triamcinolone (NASACORT ALLERGY 24HR) 55 MCG/ACT nasal aerosol Spray 1 Units in nostril every 24 hours      UNABLE TO FIND MEDICATION NAME: Salt Stick Vitassium      vitamin B-12 (CYANOCOBALAMIN) 1000 MCG tablet       vitamin D3 (CHOLECALCIFEROL) 2000 units (50 mcg) tablet Take 1 tablet by mouth      XOLAIR 150 MG/ML SOSY injection       cromolyn (GASTROCROM) 100 MG/5ML (HIGH CONC) solution 30 MINUTES BEFORE MEALS FOUR TIMES DAILY X 14 DAYS      methylcellulose (CITRUCEL) 500 MG TABS tablet Take 100 mg by mouth daily (Patient not taking: Reported on 3/20/2024)         ROS:  C: NEGATIVE for fever, chills, change in weight  I: NEGATIVE for worrisome rashes, moles or lesions  E: NEGATIVE for vision changes or irritation  E/M: NEGATIVE for ear, mouth and throat problems  R: NEGATIVE for significant cough or SOB  CV: NEGATIVE for chest pain, palpitations or peripheral edema  GI: NEGATIVE for nausea, abdominal pain, heartburn, or change in bowel habits  : NEGATIVE for frequency, dysuria, hematuria, vaginal discharge  M: NEGATIVE for significant arthralgias or myalgia  N: NEGATIVE for weakness, dizziness or paresthesias  E: NEGATIVE for temperature intolerance, skin/hair changes  P: NEGATIVE for changes in mood or  "affect    EXAM:  Blood pressure 115/81, pulse 92, height 1.588 m (5' 2.5\"), last menstrual period 2024, not currently breastfeeding.   BMI= Body mass index is 42.3 kg/m .  General - pleasant female in no acute distress.  Neck - supple without lymphadenopathy or thyromegaly.  Lungs - clear to auscultation bilaterally.  Heart - regular rate and rhythm without murmur.  Breast - no nodularity, asymmetry or nipple discharge bilaterally.  Abdomen - soft, nontender, nondistended, no hepatosplenomegaly.  Rectovaginal - deferred.  Musculoskeletal - no gross deformities.  Neurological - normal strength, sensation, and mental status.      ASSESSMENT/PLAN:  Damaris is a    49 yo with PMH s/f POTS, IBS, DVT and cervical rib resection who presents due to concern for endometriosis and potential hysterectomy.     Endometriosis  Dysmenorrhea  Patient clinically diagnosed with endometriosis, appears to have cyclic symptoms that are reproduced during her periods  and ovulation days, mostly manifesting as painful cramps and back pain. Discussed with the patient that due to her age, symptoms and comorbidities, hysterectomy would be the definitive management. The patient is agreeable to the procedure. For endometriosis, oophorectomy would be additionally indicated. We will proceed with Robot assisted laparoscopic exploration of the pelvis and removal of any visible endometriomas. Given medical complexity and risk for POTS and IBS symptom worsening after menopause and the limited medical management options for menopause symptoms, recommended discussion with Dr Presley regarding POTS and IBS progression, which the patient will engage.   She will let us know of her decision on whether to remove the ovaries if no endometriosis is found- though she is heavily leaning toward BSO at same time.  - Imaging with TV U/S and CT A/P for operative planning  - Case request for robotic hysterectomy and laparoscopic intervention. We discussed " surgical risks, including bleeding, infection, damage to nearby structures, need for transfusion, ongoing pain if caused by other source. Discussed routine post op care and possible discharge day of surgery vs next day d/t POTS.       Letter will be sent to the referring provider.    Patient seen and discussed with Dr Dunn    [facility] staff:  Appreciate note by Dr. Xavier.  I have seen and examined the patient with the resident. I have reviewed, edited, and agree with the note.      Joslyn Dunn MD, FACOG  3/20/2024  9:06 PM      Binh Xavier MD  Internal Medicine, PGY-3  [facility]  [phone]

## 2024-04-05 ENCOUNTER — ANCILLARY PROCEDURE (OUTPATIENT)
Dept: ULTRASOUND IMAGING | Facility: CLINIC | Age: 49
End: 2024-04-05
Attending: OBSTETRICS & GYNECOLOGY
Payer: COMMERCIAL

## 2024-04-05 ENCOUNTER — PREP FOR PROCEDURE (OUTPATIENT)
Dept: OBGYN | Facility: CLINIC | Age: 49
End: 2024-04-05
Payer: COMMERCIAL

## 2024-04-05 ENCOUNTER — TELEPHONE (OUTPATIENT)
Dept: OBGYN | Facility: CLINIC | Age: 49
End: 2024-04-05
Payer: COMMERCIAL

## 2024-04-05 DIAGNOSIS — N94.6 DYSMENORRHEA: ICD-10-CM

## 2024-04-05 DIAGNOSIS — N94.6 DYSMENORRHEA: Primary | ICD-10-CM

## 2024-04-05 PROCEDURE — 76830 TRANSVAGINAL US NON-OB: CPT

## 2024-04-05 RX ORDER — ACETAMINOPHEN 325 MG/1
975 TABLET ORAL ONCE
OUTPATIENT
Start: 2024-04-05 | End: 2024-04-05

## 2024-04-05 RX ORDER — METRONIDAZOLE 500 MG/100ML
500 INJECTION, SOLUTION INTRAVENOUS ONCE
Status: ACTIVE | OUTPATIENT
Start: 2024-04-05

## 2024-04-05 NOTE — PROGRESS NOTES
Performing surgeon: Self    Surgeon Procedure time (incision-close in minutes): 180    Surgery location: [place]     Urgency of Surgery?: Patients Choice/ Next Available    Is this a multi surgeon case?: No  If yes, please list the department and/or specific surgeon    COVID test needed?: No   If yes, please place order for PCR test    H&P to be completed by?:PCP    Postop appointment?: 2 and 6 weeks    Time off work: Time Off Work: 6 Weeks    If the patient is receiving gender-affirming care, did you inform them of the need for two letters of support: NA    Other comments:     Current BMI:   BMI Readings from Last 1 Encounters:   03/20/24 42.30 kg/m      Joslyn Dunn MD, FACOG  (she/her/hers)    Department of Ob/Gyn/Women's Health  [facility]  [place] [facility]  [address] [place] [id number]  [email or web address]  p. [phone]  f. [phone]  4/5/2024  2:43 PM

## 2024-04-15 ENCOUNTER — TELEPHONE (OUTPATIENT)
Dept: OBGYN | Facility: CLINIC | Age: 49
End: 2024-04-15
Payer: COMMERCIAL

## 2024-04-19 ENCOUNTER — THERAPY VISIT (OUTPATIENT)
Dept: OCCUPATIONAL THERAPY | Facility: CLINIC | Age: 49
End: 2024-04-19
Payer: COMMERCIAL

## 2024-04-19 DIAGNOSIS — I87.1 VENOUS THORACIC OUTLET SYNDROME OF RIGHT SUBCLAVIAN VEIN: ICD-10-CM

## 2024-04-19 DIAGNOSIS — I89.0 LYMPHEDEMA: Primary | ICD-10-CM

## 2024-04-19 DIAGNOSIS — I89.0 LYMPHEDEMA OF ARM: ICD-10-CM

## 2024-04-19 DIAGNOSIS — Z86.718 HISTORY OF DVT (DEEP VEIN THROMBOSIS): ICD-10-CM

## 2024-04-19 PROCEDURE — 97140 MANUAL THERAPY 1/> REGIONS: CPT | Mod: GO

## 2024-05-17 ENCOUNTER — THERAPY VISIT (OUTPATIENT)
Dept: OCCUPATIONAL THERAPY | Facility: CLINIC | Age: 49
End: 2024-05-17
Payer: COMMERCIAL

## 2024-05-17 DIAGNOSIS — I89.0 LYMPHEDEMA OF ARM: ICD-10-CM

## 2024-05-17 DIAGNOSIS — I87.1 VENOUS THORACIC OUTLET SYNDROME OF RIGHT SUBCLAVIAN VEIN: ICD-10-CM

## 2024-05-17 DIAGNOSIS — Z86.718 HISTORY OF DVT (DEEP VEIN THROMBOSIS): ICD-10-CM

## 2024-05-17 DIAGNOSIS — I89.0 LYMPHEDEMA: Primary | ICD-10-CM

## 2024-05-17 PROCEDURE — 97140 MANUAL THERAPY 1/> REGIONS: CPT | Mod: GO

## 2024-06-03 ENCOUNTER — ALLIED HEALTH/NURSE VISIT (OUTPATIENT)
Dept: FAMILY MEDICINE | Facility: CLINIC | Age: 49
End: 2024-06-03
Payer: COMMERCIAL

## 2024-06-03 DIAGNOSIS — Z23 ENCOUNTER FOR IMMUNIZATION: Primary | ICD-10-CM

## 2024-06-03 PROCEDURE — 90746 HEPB VACCINE 3 DOSE ADULT IM: CPT

## 2024-06-03 PROCEDURE — 90471 IMMUNIZATION ADMIN: CPT

## 2024-06-03 PROCEDURE — 99207 PR NO CHARGE NURSE ONLY: CPT

## 2024-06-03 NOTE — PROGRESS NOTES
Prior to immunization administration, verified patients identity using patient s name and date of birth. Please see Immunization Activity for additional information.     Screening Questionnaire for Adult Immunization    Are you sick today?   No   Do you have allergies to medications, food, a vaccine component or latex?   No   Have you ever had a serious reaction after receiving a vaccination?   No   Do you have a long-term health problem with heart, lung, kidney, or metabolic disease (e.g., diabetes), asthma, a blood disorder, no spleen, complement component deficiency, a cochlear implant, or a spinal fluid leak?  Are you on long-term aspirin therapy?   No   Do you have cancer, leukemia, HIV/AIDS, or any other immune system problem?   No   Do you have a parent, brother, or sister with an immune system problem?   No   In the past 3 months, have you taken medications that affect  your immune system, such as prednisone, other steroids, or anticancer drugs; drugs for the treatment of rheumatoid arthritis, Crohn s disease, or psoriasis; or have you had radiation treatments?   No   Have you had a seizure, or a brain or other nervous system problem?   No   During the past year, have you received a transfusion of blood or blood    products, or been given immune (gamma) globulin or antiviral drug?   No   For women: Are you pregnant or is there a chance you could become       pregnant during the next month?   No   Have you received any vaccinations in the past 4 weeks?   No     Immunization questionnaire answers were all negative.    I have reviewed the following standing orders:   This patient is due and qualifies for the Hepatitis B vaccine.    Click here for Hepatitis B Standing Order    I have reviewed the vaccines inclusion and exclusion criteria; No concerns regarding eligibility.     Patient instructed to remain in clinic for 15 minutes afterwards, and to report any adverse reactions. However patient declined to wait.      Screening performed by Mary Cope MA on 6/3/2024 at 1:02 PM.  Prior to immunization administration, verified patients identity using patient s name and date of birth. Please see Immunization Activity for additional information.

## 2024-06-12 ENCOUNTER — TRANSFERRED RECORDS (OUTPATIENT)
Dept: HEALTH INFORMATION MANAGEMENT | Facility: CLINIC | Age: 49
End: 2024-06-12
Payer: COMMERCIAL

## 2024-06-26 ENCOUNTER — THERAPY VISIT (OUTPATIENT)
Dept: OCCUPATIONAL THERAPY | Facility: CLINIC | Age: 49
End: 2024-06-26
Payer: COMMERCIAL

## 2024-06-26 DIAGNOSIS — I89.0 LYMPHEDEMA OF ARM: ICD-10-CM

## 2024-06-26 DIAGNOSIS — Z86.718 HISTORY OF DVT (DEEP VEIN THROMBOSIS): ICD-10-CM

## 2024-06-26 DIAGNOSIS — Z90.721 STATUS POST HYSTERECTOMY WITH OOPHORECTOMY: ICD-10-CM

## 2024-06-26 DIAGNOSIS — Z90.710 STATUS POST HYSTERECTOMY WITH OOPHORECTOMY: ICD-10-CM

## 2024-06-26 DIAGNOSIS — I89.0 LYMPHEDEMA: Primary | ICD-10-CM

## 2024-06-26 DIAGNOSIS — I87.1 VENOUS THORACIC OUTLET SYNDROME OF RIGHT SUBCLAVIAN VEIN: ICD-10-CM

## 2024-06-26 PROCEDURE — 97140 MANUAL THERAPY 1/> REGIONS: CPT | Mod: GO

## 2024-06-28 ENCOUNTER — TELEPHONE (OUTPATIENT)
Dept: OBGYN | Facility: CLINIC | Age: 49
End: 2024-06-28
Payer: COMMERCIAL

## 2024-06-28 NOTE — TELEPHONE ENCOUNTER
LVM for patient to schedule visit with Dr. Dunn on 9/9 to sign Hysterectomy acknowledgement form.    Complex  okayed scheduling despite schedule not being opened at time of writing

## 2024-07-03 ENCOUNTER — TELEPHONE (OUTPATIENT)
Dept: OBGYN | Facility: CLINIC | Age: 49
End: 2024-07-03
Payer: COMMERCIAL

## 2024-07-03 NOTE — TELEPHONE ENCOUNTER
LVM for patient to let them know Dr. Dunn's recommendation prior to surgery is to make an appointment with Dr. Presley to go over pre and post surgical planning.     Beatriz HARRIS

## 2024-07-18 ENCOUNTER — THERAPY VISIT (OUTPATIENT)
Dept: OCCUPATIONAL THERAPY | Facility: CLINIC | Age: 49
End: 2024-07-18
Payer: COMMERCIAL

## 2024-07-18 DIAGNOSIS — I89.0 LYMPHEDEMA: Primary | ICD-10-CM

## 2024-07-18 DIAGNOSIS — I89.0 LYMPHEDEMA OF ARM: ICD-10-CM

## 2024-07-18 DIAGNOSIS — I87.1 VENOUS THORACIC OUTLET SYNDROME OF RIGHT SUBCLAVIAN VEIN: ICD-10-CM

## 2024-07-18 DIAGNOSIS — Z86.718 HISTORY OF DVT (DEEP VEIN THROMBOSIS): ICD-10-CM

## 2024-07-18 PROCEDURE — 97140 MANUAL THERAPY 1/> REGIONS: CPT | Mod: GO

## 2024-07-30 ENCOUNTER — MYC MEDICAL ADVICE (OUTPATIENT)
Dept: OBGYN | Facility: CLINIC | Age: 49
End: 2024-07-30
Payer: COMMERCIAL

## 2024-07-30 DIAGNOSIS — N80.9 ENDOMETRIOSIS: ICD-10-CM

## 2024-07-30 DIAGNOSIS — N94.6 DYSMENORRHEA: Primary | ICD-10-CM

## 2024-08-11 SDOH — HEALTH STABILITY: PHYSICAL HEALTH: ON AVERAGE, HOW MANY MINUTES DO YOU ENGAGE IN EXERCISE AT THIS LEVEL?: 30 MIN

## 2024-08-11 SDOH — HEALTH STABILITY: PHYSICAL HEALTH: ON AVERAGE, HOW MANY DAYS PER WEEK DO YOU ENGAGE IN MODERATE TO STRENUOUS EXERCISE (LIKE A BRISK WALK)?: 4 DAYS

## 2024-08-11 ASSESSMENT — SOCIAL DETERMINANTS OF HEALTH (SDOH): HOW OFTEN DO YOU GET TOGETHER WITH FRIENDS OR RELATIVES?: ONCE A WEEK

## 2024-08-12 ENCOUNTER — OFFICE VISIT (OUTPATIENT)
Dept: FAMILY MEDICINE | Facility: CLINIC | Age: 49
End: 2024-08-12
Payer: COMMERCIAL

## 2024-08-12 VITALS
HEART RATE: 94 BPM | DIASTOLIC BLOOD PRESSURE: 72 MMHG | BODY MASS INDEX: 39.09 KG/M2 | OXYGEN SATURATION: 100 % | HEIGHT: 64 IN | RESPIRATION RATE: 19 BRPM | SYSTOLIC BLOOD PRESSURE: 131 MMHG | TEMPERATURE: 96.7 F | WEIGHT: 229 LBS

## 2024-08-12 DIAGNOSIS — Z13.1 SCREENING FOR DIABETES MELLITUS: ICD-10-CM

## 2024-08-12 DIAGNOSIS — Z13.220 SCREENING FOR HYPERLIPIDEMIA: ICD-10-CM

## 2024-08-12 DIAGNOSIS — Z12.31 ENCOUNTER FOR SCREENING MAMMOGRAM FOR BREAST CANCER: ICD-10-CM

## 2024-08-12 DIAGNOSIS — Z00.00 ROUTINE GENERAL MEDICAL EXAMINATION AT A HEALTH CARE FACILITY: Primary | ICD-10-CM

## 2024-08-12 LAB
ALBUMIN SERPL BCG-MCNC: 3.9 G/DL (ref 3.5–5.2)
ALP SERPL-CCNC: 71 U/L (ref 40–150)
ALT SERPL W P-5'-P-CCNC: 10 U/L (ref 0–50)
ANION GAP SERPL CALCULATED.3IONS-SCNC: 15 MMOL/L (ref 7–15)
AST SERPL W P-5'-P-CCNC: 19 U/L (ref 0–45)
BILIRUB SERPL-MCNC: 0.3 MG/DL
BUN SERPL-MCNC: 9 MG/DL (ref 6–20)
CALCIUM SERPL-MCNC: 8.9 MG/DL (ref 8.8–10.4)
CHLORIDE SERPL-SCNC: 103 MMOL/L (ref 98–107)
CHOLEST SERPL-MCNC: 212 MG/DL
CREAT SERPL-MCNC: 0.68 MG/DL (ref 0.51–0.95)
EGFRCR SERPLBLD CKD-EPI 2021: >90 ML/MIN/1.73M2
FASTING STATUS PATIENT QL REPORTED: YES
FASTING STATUS PATIENT QL REPORTED: YES
GLUCOSE SERPL-MCNC: 95 MG/DL (ref 70–99)
HBA1C MFR BLD: 5.5 % (ref 0–5.6)
HCO3 SERPL-SCNC: 23 MMOL/L (ref 22–29)
HDLC SERPL-MCNC: 59 MG/DL
LDLC SERPL CALC-MCNC: 126 MG/DL
NONHDLC SERPL-MCNC: 153 MG/DL
POTASSIUM SERPL-SCNC: 4.4 MMOL/L (ref 3.4–5.3)
PROT SERPL-MCNC: 6.9 G/DL (ref 6.4–8.3)
SODIUM SERPL-SCNC: 141 MMOL/L (ref 135–145)
TRIGL SERPL-MCNC: 137 MG/DL

## 2024-08-12 PROCEDURE — 83036 HEMOGLOBIN GLYCOSYLATED A1C: CPT | Performed by: INTERNAL MEDICINE

## 2024-08-12 PROCEDURE — 80053 COMPREHEN METABOLIC PANEL: CPT | Performed by: INTERNAL MEDICINE

## 2024-08-12 PROCEDURE — 36415 COLL VENOUS BLD VENIPUNCTURE: CPT | Performed by: INTERNAL MEDICINE

## 2024-08-12 PROCEDURE — 80061 LIPID PANEL: CPT | Performed by: INTERNAL MEDICINE

## 2024-08-12 PROCEDURE — 99396 PREV VISIT EST AGE 40-64: CPT | Performed by: INTERNAL MEDICINE

## 2024-08-12 ASSESSMENT — PAIN SCALES - GENERAL: PAINLEVEL: NO PAIN (0)

## 2024-08-12 NOTE — PATIENT INSTRUCTIONS
Patient Education   Preventive Care Advice   This is general advice given by our system to help you stay healthy. However, your care team may have specific advice just for you. Please talk to your care team about your preventive care needs.  Nutrition  Eat 5 or more servings of fruits and vegetables each day.  Try wheat bread, brown rice and whole grain pasta (instead of white bread, rice, and pasta).  Get enough calcium and vitamin D. Check the label on foods and aim for 100% of the RDA (recommended daily allowance).  Lifestyle  Exercise at least 150 minutes each week  (30 minutes a day, 5 days a week).  Do muscle strengthening activities 2 days a week. These help control your weight and prevent disease.  No smoking.  Wear sunscreen to prevent skin cancer.  Have a dental exam and cleaning every 6 months.  Yearly exams  See your health care team every year to talk about:  Any changes in your health.  Any medicines your care team has prescribed.  Preventive care, family planning, and ways to prevent chronic diseases.  Shots (vaccines)   HPV shots (up to age 26), if you've never had them before.  Hepatitis B shots (up to age 59), if you've never had them before.  COVID-19 shot: Get this shot when it's due.  Flu shot: Get a flu shot every year.  Tetanus shot: Get a tetanus shot every 10 years.  Pneumococcal, hepatitis A, and RSV shots: Ask your care team if you need these based on your risk.  Shingles shot (for age 50 and up)  General health tests  Diabetes screening:  Starting at age 35, Get screened for diabetes at least every 3 years.  If you are younger than age 35, ask your care team if you should be screened for diabetes.  Cholesterol test: At age 39, start having a cholesterol test every 5 years, or more often if advised.  Bone density scan (DEXA): At age 50, ask your care team if you should have this scan for osteoporosis (brittle bones).  Hepatitis C: Get tested at least once in your life.  STIs (sexually  transmitted infections)  Before age 24: Ask your care team if you should be screened for STIs.  After age 24: Get screened for STIs if you're at risk. You are at risk for STIs (including HIV) if:  You are sexually active with more than one person.  You don't use condoms every time.  You or a partner was diagnosed with a sexually transmitted infection.  If you are at risk for HIV, ask about PrEP medicine to prevent HIV.  Get tested for HIV at least once in your life, whether you are at risk for HIV or not.  Cancer screening tests  Cervical cancer screening: If you have a cervix, begin getting regular cervical cancer screening tests starting at age 21.  Breast cancer scan (mammogram): If you've ever had breasts, begin having regular mammograms starting at age 40. This is a scan to check for breast cancer.  Colon cancer screening: It is important to start screening for colon cancer at age 45.  Have a colonoscopy test every 10 years (or more often if you're at risk) Or, ask your provider about stool tests like a FIT test every year or Cologuard test every 3 years.  To learn more about your testing options, visit:   .  For help making a decision, visit:   https://bit.ly/no71976.  Prostate cancer screening test: If you have a prostate, ask your care team if a prostate cancer screening test (PSA) at age 55 is right for you.  Lung cancer screening: If you are a current or former smoker ages 50 to 80, ask your care team if ongoing lung cancer screenings are right for you.  For informational purposes only. Not to replace the advice of your health care provider. Copyright   2023 [employer]. All rights reserved. Clinically reviewed by the [facility]. Matchbook [id number] - REV 01/24.

## 2024-08-12 NOTE — PROGRESS NOTES
"Preventive Care Visit  [facility]  Alan Tobar MD, Internal Medicine  Aug 12, 2024      Assessment & Plan     Routine general medical examination at a health care facility   she has mast cell activation syndrome  She also has got POTS  She is currently following up with a specialist for pots and also with the allergist/immunologist for her mast cell syndrome  She is on multiple medications including oral but no side/Claritin/Xyzal  She is also on Florinef  She gets periodic IV fluids  Up-to-date with Pap smear  Up-to-date with mammograms  Up-to-date with colonoscopy  - Comprehensive metabolic panel (BMP + Alb, Alk Phos, ALT, AST, Total. Bili, TP); Future  - Comprehensive metabolic panel (BMP + Alb, Alk Phos, ALT, AST, Total. Bili, TP)    Screening for diabetes mellitus    - Hemoglobin A1c; Future  - Hemoglobin A1c    Screening for hyperlipidemia    - Lipid panel reflex to direct LDL Fasting; Future  - Lipid panel reflex to direct LDL Fasting    Encounter for screening mammogram for breast cancer    - MA Screen Bilateral w/Ion; Future    Patient has been advised of split billing requirements and indicates understanding: No        BMI  Estimated body mass index is 39.93 kg/m  as calculated from the following:    Height as of this encounter: 1.613 m (5' 3.5\").    Weight as of this encounter: 103.9 kg (229 lb).       Counseling  Appropriate preventive services were addressed with this patient via screening, questionnaire, or discussion as appropriate for fall prevention, nutrition, physical activity, Tobacco-use cessation, weight loss and cognition.  Checklist reviewing preventive services available has been given to the patient.  Reviewed patient's diet, addressing concerns and/or questions.   She is at risk for psychosocial distress and has been provided with information to reduce risk.           Lissy Ocampo is a 49 year old, presenting for the following:  Physical        8/12/2024 "     7:42 AM   Additional Questions   Roomed by Lisa   Accompanied by self        Health Care Directive  Patient does not have a Health Care Directive or Living Will: Patient states has Advance Directive and will bring in a copy to clinic.    HPI  Here for annual physical            8/11/2024   General Health   How would you rate your overall physical health? Good   Feel stress (tense, anxious, or unable to sleep) Only a little      (!) STRESS CONCERN      8/11/2024   Nutrition   Three or more servings of calcium each day? Yes   Diet: Gluten-free/reduced   How many servings of fruit and vegetables per day? 4 or more   How many sweetened beverages each day? 0-1            8/11/2024   Exercise   Days per week of moderate/strenous exercise 4 days   Average minutes spent exercising at this level 30 min            8/11/2024   Social Factors   Frequency of gathering with friends or relatives Once a week   Worry food won't last until get money to buy more No   Food not last or not have enough money for food? No   Do you have housing? (Housing is defined as stable permanent housing and does not include staying ouside in a car, in a tent, in an abandoned building, in an overnight shelter, or couch-surfing.) Yes   Are you worried about losing your housing? No   Lack of transportation? No   Unable to get utilities (heat,electricity)? No            8/11/2024   Dental   Dentist two times every year? Yes                 Today's PHQ-2 Score:       3/20/2024    12:54 PM   PHQ-2 ( 1999 [employer])   Q1: Little interest or pleasure in doing things 0   Q2: Feeling down, depressed or hopeless 0   PHQ-2 Score 0   Q1: Little interest or pleasure in doing things Not at all   Q2: Feeling down, depressed or hopeless Not at all   PHQ-2 Score 0         8/11/2024   Substance Use   Alcohol more than 3/day or more than 7/wk Not Applicable   Do you use any other substances recreationally? No        Social History     Tobacco Use     Smoking status:  Never     Smokeless tobacco: Never   Vaping Use     Vaping status: Never Used   Substance Use Topics     Alcohol use: Not Currently     Comment: 0-1 cocktails per month     Drug use: No           9/20/2023   LAST FHS-7 RESULTS   2 or more relatives with breast AND/OR bowel cancer No           Mammogram Screening - Mammogram every 1-2 years updated in Health Maintenance based on mutual decision making        8/11/2024   STI Screening   New sexual partner(s) since last STI/HIV test? No        History of abnormal Pap smear: No - age 30- 64 PAP with HPV every 5 years recommended        Latest Ref Rng & Units 8/19/2020    11:40 AM 8/19/2020    11:30 AM 12/16/2015     9:15 AM   PAP / HPV   PAP (Historical)  NIL      HPV 16 DNA NEG^Negative  Negative  Negative    HPV 18 DNA NEG^Negative  Negative  Negative    Other HR HPV NEG^Negative  Negative  Negative      ASCVD Risk   The 10-year ASCVD risk score (Sabine BUCIO, et al., 2019) is: 1.3%    Values used to calculate the score:      Age: 49 years      Sex: Female      Is Non- : No      Diabetic: No      Tobacco smoker: No      Systolic Blood Pressure: 131 mmHg      Is BP treated: No      HDL Cholesterol: 54 mg/dL      Total Cholesterol: 218 mg/dL        8/11/2024   Contraception/Family Planning   Questions about contraception or family planning No           Reviewed and updated as needed this visit by Provider                    Past Medical History:   Diagnosis Date     Alternating constipation and diarrhea 08/20/2022     Asthma      Discoid lupus      Diverticulosis of large intestine 06/2021    FOUND SCREENING SCOPE, TRANSVERSE/DESCENDING     Dysmenorrhea 08/20/2022     GERD (gastroesophageal reflux disease) 10/04/2012     Menorrhagia with regular cycle 07/26/2019     Polycystic ovarian syndrome      POTS (postural orthostatic tachycardia syndrome) 08/2021     RW ALLERGIES/IMMUNOLOGY (ABSTRACTED)      Sinusitis      Subclavian vein  "thrombosis, right (H) 2000     Thoracic outlet syndrome associated with cervical rib 08/20/2022         Review of Systems  Constitutional, HEENT, cardiovascular, pulmonary, gi and gu systems are negative, except as otherwise noted.     Objective    Exam  /72 (BP Location: Left arm, Patient Position: Sitting, Cuff Size: Adult Large)   Pulse 94   Temp (!) 96.7  F (35.9  C) (Temporal)   Resp 19   Ht 1.613 m (5' 3.5\")   Wt 103.9 kg (229 lb)   LMP 08/05/2024 (Exact Date)   SpO2 100%   BMI 39.93 kg/m     Estimated body mass index is 39.93 kg/m  as calculated from the following:    Height as of this encounter: 1.613 m (5' 3.5\").    Weight as of this encounter: 103.9 kg (229 lb).    Physical Exam  GENERAL: alert and no distress  EYES: Eyes grossly normal to inspection, PERRL and conjunctivae and sclerae normal  HENT: ear canals and TM's normal, nose and mouth without ulcers or lesions  NECK: no adenopathy, no asymmetry, masses, or scars  RESP: lungs clear to auscultation - no rales, rhonchi or wheezes  CV: regular rate and rhythm, normal S1 S2, no S3 or S4, no murmur, click or rub, no peripheral edema  ABDOMEN: soft, nontender, no hepatosplenomegaly, no masses and bowel sounds normal  MS: no gross musculoskeletal defects noted, no edema  SKIN: no suspicious lesions or rashes  NEURO: Normal strength and tone, mentation intact and speech normal  PSYCH: mentation appears normal, affect normal/bright        Signed Electronically by: Alan Tobar MD    "

## 2024-08-14 ENCOUNTER — MYC MEDICAL ADVICE (OUTPATIENT)
Dept: FAMILY MEDICINE | Facility: CLINIC | Age: 49
End: 2024-08-14
Payer: COMMERCIAL

## 2024-08-14 NOTE — TELEPHONE ENCOUNTER
Copy made and placed in Aug TC folder for scanning. Original placed up front in forms drawer and logged in book.  Sabiha Nguyễn, CMA

## 2024-08-16 ENCOUNTER — THERAPY VISIT (OUTPATIENT)
Dept: OCCUPATIONAL THERAPY | Facility: CLINIC | Age: 49
End: 2024-08-16
Payer: COMMERCIAL

## 2024-08-16 DIAGNOSIS — I87.1 VENOUS THORACIC OUTLET SYNDROME OF RIGHT SUBCLAVIAN VEIN: ICD-10-CM

## 2024-08-16 DIAGNOSIS — I89.0 LYMPHEDEMA OF ARM: ICD-10-CM

## 2024-08-16 DIAGNOSIS — Z86.718 HISTORY OF DVT (DEEP VEIN THROMBOSIS): ICD-10-CM

## 2024-08-16 DIAGNOSIS — I89.0 LYMPHEDEMA: Primary | ICD-10-CM

## 2024-08-16 PROCEDURE — 97140 MANUAL THERAPY 1/> REGIONS: CPT | Mod: GO

## 2024-08-21 ENCOUNTER — PATIENT OUTREACH (OUTPATIENT)
Dept: CARE COORDINATION | Facility: CLINIC | Age: 49
End: 2024-08-21
Payer: COMMERCIAL

## 2024-08-21 ENCOUNTER — TELEPHONE (OUTPATIENT)
Dept: OBGYN | Facility: CLINIC | Age: 49
End: 2024-08-21
Payer: COMMERCIAL

## 2024-08-21 NOTE — TELEPHONE ENCOUNTER
----- Message from Mariella HARRIS sent at 8/21/2024 11:15 AM CDT -----  Regarding: Post op with Shaun Gonsalves can someone call this patient and help her get scheduled for a 6 week post op appointment with Dr. Dunn. DOS: 10/25    Thanks    Beatriz  
LVM with pt to call and schedule he 6wk po with Dr Dunn DOS 10/25  
negative...

## 2024-09-05 ENCOUNTER — ANCILLARY PROCEDURE (OUTPATIENT)
Dept: CT IMAGING | Facility: CLINIC | Age: 49
End: 2024-09-05
Attending: OBSTETRICS & GYNECOLOGY
Payer: COMMERCIAL

## 2024-09-05 DIAGNOSIS — N80.9 ENDOMETRIOSIS: ICD-10-CM

## 2024-09-05 DIAGNOSIS — N94.6 DYSMENORRHEA: ICD-10-CM

## 2024-09-05 PROCEDURE — 250N000009 HC RX 250: Performed by: OBSTETRICS & GYNECOLOGY

## 2024-09-05 PROCEDURE — 74177 CT ABD & PELVIS W/CONTRAST: CPT

## 2024-09-05 PROCEDURE — 250N000011 HC RX IP 250 OP 636: Performed by: OBSTETRICS & GYNECOLOGY

## 2024-09-05 RX ORDER — IOPAMIDOL 755 MG/ML
110 INJECTION, SOLUTION INTRAVASCULAR ONCE
Status: COMPLETED | OUTPATIENT
Start: 2024-09-05 | End: 2024-09-05

## 2024-09-05 RX ADMIN — IOPAMIDOL 110 ML: 755 INJECTION, SOLUTION INTRAVENOUS at 13:11

## 2024-09-05 RX ADMIN — SODIUM CHLORIDE 72 ML: 9 INJECTION, SOLUTION INTRAVENOUS at 13:11

## 2024-09-09 ENCOUNTER — OFFICE VISIT (OUTPATIENT)
Dept: OBGYN | Facility: CLINIC | Age: 49
End: 2024-09-09
Attending: OBSTETRICS & GYNECOLOGY
Payer: COMMERCIAL

## 2024-09-09 VITALS
HEART RATE: 85 BPM | BODY MASS INDEX: 37.22 KG/M2 | HEIGHT: 64 IN | SYSTOLIC BLOOD PRESSURE: 141 MMHG | DIASTOLIC BLOOD PRESSURE: 74 MMHG | WEIGHT: 218 LBS

## 2024-09-09 DIAGNOSIS — E66.812 CLASS 2 SEVERE OBESITY DUE TO EXCESS CALORIES WITH SERIOUS COMORBIDITY AND BODY MASS INDEX (BMI) OF 38.0 TO 38.9 IN ADULT (H): ICD-10-CM

## 2024-09-09 DIAGNOSIS — N94.6 DYSMENORRHEA: Primary | ICD-10-CM

## 2024-09-09 DIAGNOSIS — E66.01 CLASS 2 SEVERE OBESITY DUE TO EXCESS CALORIES WITH SERIOUS COMORBIDITY AND BODY MASS INDEX (BMI) OF 38.0 TO 38.9 IN ADULT (H): ICD-10-CM

## 2024-09-09 PROCEDURE — 99213 OFFICE O/P EST LOW 20 MIN: CPT | Performed by: OBSTETRICS & GYNECOLOGY

## 2024-09-09 NOTE — PATIENT INSTRUCTIONS
Thank you for trusting us with your care!   Please be aware, if you are on Mychart, you may see your results prior to your providers review. If labs are abnormal, we will call or message you on [employer] with a follow up plan.    If you need to contact us for questions about:  Symptoms, Scheduling & Medical Questions; Non-urgent (2-3 day response) [employer] [place] message, Urgent (needing response today) [phone] (if after 3:30pm next day response)   Prescriptions: Please call your Pharmacy   Billing: [facility] [phone] or [facility]:[phone]

## 2024-09-09 NOTE — LETTER
9/9/2024       RE: Damaris Sarmiento  [address] [facility] [address] [address]     Dear Colleague,    Thank you for referring your patient, Damaris Sarmiento, to the [facility] [place] at [facility]. Please see a copy of my visit note below.    Patient here to discuss upcoming surgery RATLHBSO answer any further questions and to sign hysterectomy consent form.     Had recent CT scan that showed still only single small fibroid as well as incidental liver cyst.      Questions answered to the best of my knowledge.  Recommend she speak with Dr. Mackenzie re: POTS, pre and post op strategies for pain mgmt, etc.     Acknowledgement signed. Pre op with Dr. Mackenzie in early October.  Given h/o RUE DVT will plan on either no HRT or progesterone only medication post oophorectomy.    TT spent 20 min with patient and her , discussing pre op plans, f/up appts, need for pre op appt, expected post op recovery and post op restrictions.     Joslyn Dunn MD, FACOG  (she/her/hers)    Department of Ob/Gyn/Women's Health  [facility]  [facility]  [address] [place], [place] [address]. [place] [address]  [email or web address]  p. [phone]  f. [phone]      Again, thank you for allowing me to participate in the care of your patient.      Sincerely,    Joslyn Dunn MD

## 2024-09-09 NOTE — NURSING NOTE
Chief Complaint   Patient presents with    Follow Up     Sign hysterectomy acknowledgement form    Cat Zendejas LPN

## 2024-09-09 NOTE — PROGRESS NOTES
Patient here to discuss upcoming surgery RATLHBSO answer any further questions and to sign hysterectomy consent form.     Had recent CT scan that showed still only single small fibroid as well as incidental liver cyst.      Questions answered to the best of my knowledge.  Recommend she speak with Dr. Mackenzie re: POTS, pre and post op strategies for pain mgmt, etc.     Acknowledgement signed. Pre op with Dr. Mackenzie in early October.  Given h/o RUE DVT will plan on either no HRT or progesterone only medication post oophorectomy.    TT spent 20 min with patient and her , discussing pre op plans, f/up appts, need for pre op appt, expected post op recovery and post op restrictions.     Joslyn Dunn MD, FACOG  (she/her/hers)    Department of Ob/Gyn/Women's Health  [facility] [employer] [facility]  [facility]  [address] [place] [address]  [email or web address]  p. [phone]  f. [phone]

## 2024-09-16 ENCOUNTER — MYC MEDICAL ADVICE (OUTPATIENT)
Dept: OBGYN | Facility: CLINIC | Age: 49
End: 2024-09-16
Payer: COMMERCIAL

## 2024-09-18 ENCOUNTER — PATIENT OUTREACH (OUTPATIENT)
Dept: CARE COORDINATION | Facility: CLINIC | Age: 49
End: 2024-09-18
Payer: COMMERCIAL

## 2024-09-18 ENCOUNTER — TELEPHONE (OUTPATIENT)
Dept: OBGYN | Facility: CLINIC | Age: 49
End: 2024-09-18
Payer: COMMERCIAL

## 2024-09-18 NOTE — TELEPHONE ENCOUNTER
Writer received patient's paperwork. Writer printed off paperwork, placed patient label on the paperwork and put in Dr. Dunn's bin to be completed. Writer informed patient that it may take up to 7-10 business days for forms to be signed and completed. New forms encounter started and message routed to rooming staff about paperwork to be completed.

## 2024-09-30 ENCOUNTER — OFFICE VISIT (OUTPATIENT)
Dept: FAMILY MEDICINE | Facility: CLINIC | Age: 49
End: 2024-09-30
Payer: COMMERCIAL

## 2024-09-30 VITALS
WEIGHT: 224.2 LBS | OXYGEN SATURATION: 99 % | TEMPERATURE: 98.5 F | HEIGHT: 62 IN | RESPIRATION RATE: 20 BRPM | DIASTOLIC BLOOD PRESSURE: 70 MMHG | BODY MASS INDEX: 41.26 KG/M2 | HEART RATE: 104 BPM | SYSTOLIC BLOOD PRESSURE: 111 MMHG

## 2024-09-30 DIAGNOSIS — Z01.818 PREOP GENERAL PHYSICAL EXAM: Primary | ICD-10-CM

## 2024-09-30 DIAGNOSIS — G90.A POTS (POSTURAL ORTHOSTATIC TACHYCARDIA SYNDROME): ICD-10-CM

## 2024-09-30 DIAGNOSIS — N94.6 DYSMENORRHEA: ICD-10-CM

## 2024-09-30 DIAGNOSIS — D89.40 MAST CELL ACTIVATION SYNDROME (H): ICD-10-CM

## 2024-09-30 DIAGNOSIS — I82.B12 ACUTE EMBOLISM AND THROMBOSIS OF LEFT SUBCLAVIAN VEIN (H): ICD-10-CM

## 2024-09-30 PROCEDURE — 99214 OFFICE O/P EST MOD 30 MIN: CPT | Performed by: INTERNAL MEDICINE

## 2024-09-30 ASSESSMENT — PAIN SCALES - GENERAL: PAINLEVEL: MODERATE PAIN (4)

## 2024-09-30 NOTE — PROGRESS NOTES
Preoperative Evaluation  [address] [address]  Phone: [phone]  Primary Provider: Alan Tobar MD  Pre-op Performing Provider: Alan Tobar MD  Sep 30, 2024             9/26/2024   Surgical Information   What procedure is being done? Complete hysterectomy and ovary remival, removal of fibroid and any endometriosis   Facility or Hospital where procedure/surgery will be performed: [facility]   Who is doing the procedure / surgery? Dr. Joslyn Dunn   Date of surgery / procedure: 10/25/2024   Time of surgery / procedure: 5:30 am   Where do you plan to recover after surgery? at home with family        Fax number for surgical facility: Note does not need to be faxed, will be available electronically in Epic.    Assessment & Plan     The proposed surgical procedure is considered INTERMEDIATE risk.    Preop general physical exam  She has no history of any CAD or CVA  She is on aspirin  She is going to hold it for 10 days before the procedure  Her exercise tolerance is very good, it is 9.8 METS or more  She recently had some extensive lab work in August including CMP and CBC and everything looks good  She was a prediabetic in the past but currently her A1c is back to normal  She is medically optimized for procedure  No further testing is warranted    Dysmenorrhea  She follows up with gynecology  She has a fibroid  She is going to get hysterectomy with bilateral salpingo-oophorectomy robot-assisted    POTS (postural orthostatic tachycardia syndrome)  She follows up with cardiology and is on Florinef    Acute embolism and thrombosis of left subclavian vein (H)  This was many years ago when she has thoracic outlet syndrome  This is a provoked DVT and her thrombophilia workup was negative  She is not on any anticoagulants currently    Mast cell activation syndrome (H)  She is on multiple antihistamines and also takes Xolair            - No identified  additional risk factors other than previously addressed    Antiplatelet or Anticoagulation Medication Instructions   - aspirin: Discontinue aspirin 7-10 days prior to procedure to reduce bleeding risk. It should be resumed postoperatively.     Additional Medication Instructions  Take all scheduled medications on the day of surgery    Recommendation  Approval given to proceed with proposed procedure, without further diagnostic evaluation.    Lissy Ocampo is a 49 year old, presenting for the following:  Pre-Op Exam (10-25-24, Hysterectomy, Dr. Dunn, [facility])          9/30/2024    10:46 AM   Additional Questions   Roomed by Jazzy BROOKS   Accompanied by self     HPI related to upcoming procedure: Here for a preop        9/26/2024   Pre-Op Questionnaire   Have you ever had a heart attack or stroke? No   Have you ever had surgery on your heart or blood vessels, such as a stent placement, a coronary artery bypass, or surgery on an artery in your head, neck, heart, or legs? (!) YES    Do you have chest pain with activity? No   Do you have a history of heart failure? No   Do you currently have a cold, bronchitis or symptoms of other infection? No   Do you have a cough, shortness of breath, or wheezing? No   Do you or anyone in your family have previous history of blood clots? (!) YES    Do you or does anyone in your family have a serious bleeding problem such as prolonged bleeding following surgeries or cuts? No   Have you ever had problems with anemia or been told to take iron pills? No   Have you had any abnormal blood loss such as black, tarry or bloody stools, or abnormal vaginal bleeding? (!) YES    Have you ever had a blood transfusion? No   Are you willing to have a blood transfusion if it is medically needed before, during, or after your surgery? Yes   Have you or any of your relatives ever had problems with anesthesia? No   Do you have sleep apnea, excessive snoring or daytime drowsiness? No   Do you  have any artifical heart valves or other implanted medical devices like a pacemaker, defibrillator, or continuous glucose monitor? No   Do you have artificial joints? No   Are you allergic to latex? No        Health Care Directive  Patient does not have a Health Care Directive or Living Will: Patient states has Advance Directive and will bring in a copy to clinic.  Status of Chronic Conditions:  See problem list for active medical problems.  Problems all longstanding and stable, except as noted/documented.  See ROS for pertinent symptoms related to these conditions.    Patient Active Problem List    Diagnosis Date Noted    Class 2 severe obesity due to excess calories with serious comorbidity in adult (H) 09/09/2024     Priority: Medium    Irregular bowel habits 09/18/2023     Priority: Medium    Abdominal bloating 09/18/2023     Priority: Medium    Polyp of duodenum 12/13/2022     Priority: Medium    Heartburn 12/13/2022     Priority: Medium    Diaphragmatic hernia 12/13/2022     Priority: Medium    Chest pain 12/13/2022     Priority: Medium    Arthralgia, unspecified joint 08/20/2022     Priority: Medium    Dysmenorrhea 08/20/2022     Priority: Medium    Alternating constipation and diarrhea 08/20/2022     Priority: Medium    Thoracic outlet syndrome associated with cervical rib 08/20/2022     Priority: Medium    Abdominal distension, gaseous 06/29/2022     Priority: Medium    Mast cell activation syndrome (H) 05/30/2022     Priority: Medium    Urticaria, chronic 05/01/2022     Priority: Medium    Spondylolisthesis at L4-L5 level 09/03/2021     Priority: Medium    POTS (postural orthostatic tachycardia syndrome) 07/19/2021     Priority: Medium     Added automatically from request for surgery [id number]      Hemorrhoids 06/14/2021     Priority: Medium    Diverticular disease of large intestine 06/14/2021     Priority: Medium    Venous thoracic outlet syndrome of right subclavian vein 07/26/2019     Priority: Medium     History of DVT (deep vein thrombosis) 07/26/2019     Priority: Medium    Menorrhagia with regular cycle 07/26/2019     Priority: Medium    PCOS (polycystic ovarian syndrome) 07/26/2019     Priority: Medium    Undifferentiated spondyloarthropathy 02/15/2019     Priority: Medium    Fibromyalgia 02/15/2019     Priority: Medium    Tendinopathy of left gluteus medius 01/10/2019     Priority: Medium    Tear of left acetabular labrum 11/27/2018     Priority: Medium    Sprain of hip 11/27/2018     Priority: Medium    Closed fracture of fifth lumbar vertebra (H) 02/03/2018     Priority: Medium     Formatting of this note might be different from the original. L5-S1      Lymphedema 12/08/2017     Priority: Medium    Obesity with body mass index 30 or greater 10/11/2016     Priority: Medium    Increased immunoglobulin 07/19/2016     Priority: Medium    GERD (gastroesophageal reflux disease) 10/04/2012     Priority: Medium      Past Medical History:   Diagnosis Date    Alternating constipation and diarrhea 08/20/2022    Asthma     Discoid lupus     Diverticulosis of large intestine 06/2021    FOUND SCREENING SCOPE, TRANSVERSE/DESCENDING    Dysmenorrhea 08/20/2022    GERD (gastroesophageal reflux disease) 10/04/2012    Menorrhagia with regular cycle 07/26/2019    Polycystic ovarian syndrome     POTS (postural orthostatic tachycardia syndrome) 08/2021    RW ALLERGIES/IMMUNOLOGY (ABSTRACTED)     Sinusitis     Subclavian vein thrombosis, right (H) 2000    Thoracic outlet syndrome associated with cervical rib 08/20/2022     Past Surgical History:   Procedure Laterality Date    BIOPSY      Sinus surgery 7/2014; Flex Sigmoidoscopy 2013    ENDOSCOPIC SINUS SURGERY  07/16/2014    Procedure: ENDOSCOPIC SINUS SURGERY;  Surgeon: Suzi Vieyra MD;  Location: [facility]    ENT SURGERY  July 2014    bilateral etmoodectomy&maxillary anstronomy,turbinate resect    EP STUDY TILT TABLE N/A 08/20/2021    Procedure: EP TILT TABLE;  Surgeon:  lIya Gilmore MD;  Location:  [facility]    EYE SURGERY  7403-2055    PRK LASIK, Left eye May2924, Right Eye May2015    ORTHOPEDIC SURGERY      SOFT TISSUE SURGERY  [id number]    Several venoplasty balloon-R subclavian vein    THORACIC SURGERY  12/2000    resection of R first rib-subclavian vein clot thoracic outle    TURBINECTOMY  07/16/2014    Procedure: TURBINECTOMY;  Surgeon: Suzi Vieyra MD;  Location: [facility] NONSPECIFIC PROCEDURE  12/01/2000    resection of first rib    [facility] NONSPECIFIC PROCEDURE  01/01/1992    left foot surgery     Current Outpatient Medications   Medication Sig Dispense Refill    acetaminophen (TYLENOL) 650 MG CR tablet       BABY ASPIRIN PO       budesonide (ENTOCORT EC) 3 MG EC capsule       budesonide (RINOCORT AQUA) 32 MCG/ACT nasal spray Spray 1 spray into both nostrils daily      CLARITIN 10 MG OR TABS 1 TABLET DAILY      clobetasol (TEMOVATE) 0.05 % external ointment APPLY TOPICALLY TO THE AFFECTED AREA TWICE DAILY FOR 14 DAYS AS NEEDED FOR FLARE      EPINEPHrine (ANY BX GENERIC EQUIV) 0.3 MG/0.3ML injection 2-pack INJECT 1 PEN IN THE MUSCLE ONE TIME AS DIRECTED      fludrocortisone (FLORINEF) 0.1 MG tablet       fluocinonide (LIDEX) 0.05 % external ointment       ketorolac tromethamine (ACULAR-LS) 0.4 % SOLN ophthalmic solution       ketotifen (ZADITOR/REFRESH ANTI-ITCH) 0.025 % SOLN ophthalmic solution 1 drop      LANsoprazole (PREVACID) 15 MG DR capsule       levocetirizine (XYZAL) 5 MG tablet Take 5 mg by mouth every evening      Lidocaine (LIDOCARE) 4 % Patch       mupirocin (BACTROBAN) 2 % ointment PIPER A SMALL AMOUNT TO NOSTRILS ONCE OR BID  0    polyethylene glycol (MIRALAX) 17 GM/Dose powder Take 1 capful by mouth daily      pseudoePHEDrine (SUDAFED) 120 MG 12 hr tablet Take by mouth every 24 hours      SODIUM CHLORIDE-SODIUM BICARB NA       tacrolimus (PROTOPIC) 0.1 % external ointment       triamcinolone (NASACORT ALLERGY 24HR) 55 MCG/ACT  nasal aerosol Spray 1 Units in nostril every 24 hours      UNABLE TO FIND MEDICATION NAME: Salt Stick Vitassium      vitamin B-12 (CYANOCOBALAMIN) 1000 MCG tablet       vitamin D3 (CHOLECALCIFEROL) 2000 units (50 mcg) tablet Take 1 tablet by mouth      XOLAIR 150 MG/ML SOSY injection       methylcellulose (CITRUCEL) 500 MG TABS tablet Take 100 mg by mouth daily (Patient not taking: Reported on 9/30/2024)         Allergies   Allergen Reactions    Adhesive Tape Hives    Cat Hair Extract Hives    Dogs Hives     wheezing    Dust Mites      Other reaction(s): Wheezing  eye mucous    Erythromycin     Gluten Meal      Other reaction(s): GI intolerance    Grass Hives     eye mucous    No Clinical Screening - See Comments Hives, Itching and Photosensitivity    Oxycodone      Other reaction(s): Nausea, cramps, dizzyness    Pollen Extract      Other reaction(s): Other (see comments)  Tree pollen-hives, eye mucous    Ragweeds Hives     eye mucous    Red Dye #40 (Allura Red) Hives    Trees      Other reaction(s): Other (see comments)  Sinus issues    Wheat Hives     Other reaction(s): GI intolerance        Social History     Tobacco Use    Smoking status: Never    Smokeless tobacco: Never   Substance Use Topics    Alcohol use: Not Currently     Comment: 0-1 cocktails per month     Family History   Problem Relation Age of Onset    Osteoporosis Mother     Musculoskeletal Disorder Father         Paget's disease     Hyperlipidemia Father     Coronary Artery Disease Father     Allergies Sister     Other - See Comments Sister         endometriosis    Cerebrovascular Disease Sister 47        stroke after recent ocp start for perimenopausal sx    Cancer Maternal Grandmother         lung    Other Cancer Maternal Grandmother         Lung Cancer    Osteoporosis Maternal Grandmother     EYE* Maternal Grandfather         glaucoma    Cancer - colorectal Paternal Grandfather     Prostate Cancer Paternal Grandfather     Colon Cancer Paternal  "Grandfather     Musculoskeletal Disorder Paternal Uncle         Paget's disease    Breast Cancer Paternal Cousin 55        Second cousin    Cerebrovascular Disease Paternal Cousin 44     History   Drug Use No             Review of Systems  Constitutional, HEENT, cardiovascular, pulmonary, gi and gu systems are negative, except as otherwise noted.    Objective    /70 (BP Location: Right arm, Patient Position: Sitting, Cuff Size: Adult Large)   Pulse 104   Temp 98.5  F (36.9  C) (Oral)   Resp 20   Ht 1.581 m (5' 2.25\")   Wt 101.7 kg (224 lb 3.2 oz)   LMP 09/29/2024 (Exact Date)   SpO2 99%   BMI 40.68 kg/m     Estimated body mass index is 40.68 kg/m  as calculated from the following:    Height as of this encounter: 1.581 m (5' 2.25\").    Weight as of this encounter: 101.7 kg (224 lb 3.2 oz).  Physical Exam  GENERAL: alert and no distress  EYES: Eyes grossly normal to inspection, PERRL and conjunctivae and sclerae normal  NECK: no adenopathy, no asymmetry, masses, or scars  RESP: lungs clear to auscultation - no rales, rhonchi or wheezes  CV: regular rate and rhythm, normal S1 S2, no S3 or S4, no murmur, click or rub, no peripheral edema  ABDOMEN: soft, nontender, no hepatosplenomegaly, no masses and bowel sounds normal  MS: She has got some chronic swelling of the left lower extremity with some pitting edema which is worse by the end of the day  This is been going for the last 2 years  I suspect she might have some chronic venous insufficiency on that side  SKIN: no suspicious lesions or rashes  NEURO: Normal strength and tone, mentation intact and speech normal  PSYCH: mentation appears normal, affect normal/bright    Recent Labs   Lab Test 08/12/24  0842      POTASSIUM 4.4   CR 0.68   A1C 5.5        Diagnostics  No labs were ordered during this visit.   No EKG required, no history of coronary heart disease, significant arrhythmia, peripheral arterial disease or other structural heart " disease.    Revised Cardiac Risk Index (RCRI)  The patient has the following serious cardiovascular risks for perioperative complications:   - No serious cardiac risks = 0 points     RCRI Interpretation: 0 points: Class I (very low risk - 0.4% complication rate)         Signed Electronically by: Alan Tobar MD  A copy of this evaluation report is provided to the requesting physician.

## 2024-10-01 ENCOUNTER — ANCILLARY PROCEDURE (OUTPATIENT)
Dept: MAMMOGRAPHY | Facility: CLINIC | Age: 49
End: 2024-10-01
Attending: INTERNAL MEDICINE
Payer: COMMERCIAL

## 2024-10-01 DIAGNOSIS — Z12.31 ENCOUNTER FOR SCREENING MAMMOGRAM FOR BREAST CANCER: ICD-10-CM

## 2024-10-01 PROCEDURE — 77067 SCR MAMMO BI INCL CAD: CPT | Mod: GC | Performed by: RADIOLOGY

## 2024-10-01 PROCEDURE — 77063 BREAST TOMOSYNTHESIS BI: CPT | Mod: GC | Performed by: RADIOLOGY

## 2024-10-01 NOTE — TELEPHONE ENCOUNTER
Disability and Leave Health Care Provider Statement forms have been reviewed, completed, and signed by Dr. Dunn.  Faxed to [phone] today.  A copy of the forms is in the scanning bin waiting to be scanned into pt's chart for records.  Emailed pt a copy of her forms for her records and sent a [employer] message informing pt of completion.

## 2024-10-03 ENCOUNTER — TRANSFERRED RECORDS (OUTPATIENT)
Dept: HEALTH INFORMATION MANAGEMENT | Facility: CLINIC | Age: 49
End: 2024-10-03
Payer: COMMERCIAL

## 2024-10-03 ENCOUNTER — MEDICAL CORRESPONDENCE (OUTPATIENT)
Dept: HEALTH INFORMATION MANAGEMENT | Facility: CLINIC | Age: 49
End: 2024-10-03
Payer: COMMERCIAL

## 2024-10-04 ENCOUNTER — TRANSCRIBE ORDERS (OUTPATIENT)
Dept: OTHER | Age: 49
End: 2024-10-04

## 2024-10-04 DIAGNOSIS — N94.6 DYSMENORRHEA: ICD-10-CM

## 2024-10-04 DIAGNOSIS — N92.0 MENORRHAGIA: ICD-10-CM

## 2024-10-04 DIAGNOSIS — N80.9 ENDOMETRIOSIS: Primary | ICD-10-CM

## 2024-10-11 ENCOUNTER — THERAPY VISIT (OUTPATIENT)
Dept: OCCUPATIONAL THERAPY | Facility: CLINIC | Age: 49
End: 2024-10-11
Payer: COMMERCIAL

## 2024-10-11 DIAGNOSIS — I87.1 VENOUS THORACIC OUTLET SYNDROME OF RIGHT SUBCLAVIAN VEIN: ICD-10-CM

## 2024-10-11 DIAGNOSIS — Z86.718 HISTORY OF DVT (DEEP VEIN THROMBOSIS): ICD-10-CM

## 2024-10-11 DIAGNOSIS — I89.0 LYMPHEDEMA: Primary | ICD-10-CM

## 2024-10-11 DIAGNOSIS — I89.0 LYMPHEDEMA OF ARM: ICD-10-CM

## 2024-10-11 PROCEDURE — 97140 MANUAL THERAPY 1/> REGIONS: CPT | Mod: GO

## 2024-10-23 RX ORDER — FAMOTIDINE 20 MG/1
20 TABLET, FILM COATED ORAL DAILY
COMMUNITY

## 2024-10-23 RX ORDER — TRIAMCINOLONE ACETONIDE 1 MG/G
CREAM TOPICAL PRN
COMMUNITY

## 2024-10-24 ENCOUNTER — ANESTHESIA EVENT (OUTPATIENT)
Dept: SURGERY | Facility: CLINIC | Age: 49
End: 2024-10-24
Payer: COMMERCIAL

## 2024-10-24 RX ORDER — DIPHENHYDRAMINE HCL 25 MG
25 CAPSULE ORAL EVERY 6 HOURS PRN
Status: CANCELLED | OUTPATIENT
Start: 2024-10-24

## 2024-10-24 RX ORDER — DIPHENHYDRAMINE HYDROCHLORIDE 50 MG/ML
25 INJECTION INTRAMUSCULAR; INTRAVENOUS EVERY 6 HOURS PRN
Status: CANCELLED | OUTPATIENT
Start: 2024-10-24

## 2024-10-25 ENCOUNTER — ANESTHESIA (OUTPATIENT)
Dept: SURGERY | Facility: CLINIC | Age: 49
End: 2024-10-25
Payer: COMMERCIAL

## 2024-10-25 ENCOUNTER — HOSPITAL ENCOUNTER (OUTPATIENT)
Facility: CLINIC | Age: 49
Discharge: HOME | End: 2024-10-26
Attending: OBSTETRICS & GYNECOLOGY | Admitting: OBSTETRICS & GYNECOLOGY
Payer: COMMERCIAL

## 2024-10-25 DIAGNOSIS — G89.18 POSTOPERATIVE PAIN: Primary | ICD-10-CM

## 2024-10-25 DIAGNOSIS — G90.A POTS (POSTURAL ORTHOSTATIC TACHYCARDIA SYNDROME): ICD-10-CM

## 2024-10-25 DIAGNOSIS — I49.3 PVC'S (PREMATURE VENTRICULAR CONTRACTIONS): ICD-10-CM

## 2024-10-25 DIAGNOSIS — N94.6 DYSMENORRHEA: ICD-10-CM

## 2024-10-25 LAB
ABO/RH(D): NORMAL
ALBUMIN SERPL BCG-MCNC: 3.7 G/DL (ref 3.5–5.2)
ALP SERPL-CCNC: 66 U/L (ref 40–150)
ALT SERPL W P-5'-P-CCNC: 11 U/L (ref 0–50)
ANION GAP SERPL CALCULATED.3IONS-SCNC: 12 MMOL/L (ref 7–15)
ANION GAP SERPL CALCULATED.3IONS-SCNC: 14 MMOL/L (ref 7–15)
ANTIBODY SCREEN: NEGATIVE
AST SERPL W P-5'-P-CCNC: 13 U/L (ref 0–45)
BASOPHILS # BLD AUTO: 0.1 10E3/UL (ref 0–0.2)
BASOPHILS NFR BLD AUTO: 1 %
BILIRUB SERPL-MCNC: 0.4 MG/DL
BLD PROD TYP BPU: NORMAL
BLD PROD TYP BPU: NORMAL
BLOOD COMPONENT TYPE: NORMAL
BLOOD COMPONENT TYPE: NORMAL
BUN SERPL-MCNC: 8.5 MG/DL (ref 6–20)
BUN SERPL-MCNC: 8.9 MG/DL (ref 6–20)
CALCIUM SERPL-MCNC: 8.5 MG/DL (ref 8.8–10.4)
CALCIUM SERPL-MCNC: 9 MG/DL (ref 8.8–10.4)
CHLORIDE SERPL-SCNC: 103 MMOL/L (ref 98–107)
CHLORIDE SERPL-SCNC: 104 MMOL/L (ref 98–107)
CODING SYSTEM: NORMAL
CODING SYSTEM: NORMAL
CREAT SERPL-MCNC: 0.61 MG/DL (ref 0.51–0.95)
CREAT SERPL-MCNC: 0.71 MG/DL (ref 0.51–0.95)
CROSSMATCH: NORMAL
CROSSMATCH: NORMAL
EGFRCR SERPLBLD CKD-EPI 2021: >90 ML/MIN/1.73M2
EGFRCR SERPLBLD CKD-EPI 2021: >90 ML/MIN/1.73M2
EOSINOPHIL # BLD AUTO: 0.2 10E3/UL (ref 0–0.7)
EOSINOPHIL NFR BLD AUTO: 2 %
ERYTHROCYTE [DISTWIDTH] IN BLOOD BY AUTOMATED COUNT: 15.4 % (ref 10–15)
GLUCOSE BLDC GLUCOMTR-MCNC: 94 MG/DL (ref 70–99)
GLUCOSE SERPL-MCNC: 195 MG/DL (ref 70–99)
GLUCOSE SERPL-MCNC: 95 MG/DL (ref 70–99)
HCG INTACT+B SERPL-ACNC: <1 MIU/ML
HCO3 SERPL-SCNC: 21 MMOL/L (ref 22–29)
HCO3 SERPL-SCNC: 24 MMOL/L (ref 22–29)
HCT VFR BLD AUTO: 33.9 % (ref 35–47)
HGB BLD-MCNC: 11.1 G/DL (ref 11.7–15.7)
IMM GRANULOCYTES # BLD: 0.1 10E3/UL
IMM GRANULOCYTES NFR BLD: 0 %
LYMPHOCYTES # BLD AUTO: 4.4 10E3/UL (ref 0.8–5.3)
LYMPHOCYTES NFR BLD AUTO: 36 %
MAGNESIUM SERPL-MCNC: 1.9 MG/DL (ref 1.7–2.3)
MAGNESIUM SERPL-MCNC: 2.2 MG/DL (ref 1.7–2.3)
MCH RBC QN AUTO: 27.1 PG (ref 26.5–33)
MCHC RBC AUTO-ENTMCNC: 32.7 G/DL (ref 31.5–36.5)
MCV RBC AUTO: 83 FL (ref 78–100)
MONOCYTES # BLD AUTO: 0.9 10E3/UL (ref 0–1.3)
MONOCYTES NFR BLD AUTO: 8 %
NEUTROPHILS # BLD AUTO: 6.5 10E3/UL (ref 1.6–8.3)
NEUTROPHILS NFR BLD AUTO: 54 %
NRBC # BLD AUTO: 0 10E3/UL
NRBC BLD AUTO-RTO: 0 /100
PHOSPHATE SERPL-MCNC: 3.4 MG/DL (ref 2.5–4.5)
PLATELET # BLD AUTO: 379 10E3/UL (ref 150–450)
POTASSIUM SERPL-SCNC: 3.8 MMOL/L (ref 3.4–5.3)
POTASSIUM SERPL-SCNC: 4.3 MMOL/L (ref 3.4–5.3)
PROT SERPL-MCNC: 6.6 G/DL (ref 6.4–8.3)
RBC # BLD AUTO: 4.09 10E6/UL (ref 3.8–5.2)
SODIUM SERPL-SCNC: 139 MMOL/L (ref 135–145)
SODIUM SERPL-SCNC: 139 MMOL/L (ref 135–145)
SPECIMEN EXPIRATION DATE: NORMAL
UNIT ABO/RH: NORMAL
UNIT ABO/RH: NORMAL
UNIT NUMBER: NORMAL
UNIT NUMBER: NORMAL
UNIT STATUS: NORMAL
UNIT STATUS: NORMAL
UNIT TYPE ISBT: 6200
UNIT TYPE ISBT: 6200
WBC # BLD AUTO: 12.2 10E3/UL (ref 4–11)

## 2024-10-25 PROCEDURE — 250N000011 HC RX IP 250 OP 636: Performed by: NURSE ANESTHETIST, CERTIFIED REGISTERED

## 2024-10-25 PROCEDURE — 36415 COLL VENOUS BLD VENIPUNCTURE: CPT | Performed by: ANESTHESIOLOGY

## 2024-10-25 PROCEDURE — 250N000011 HC RX IP 250 OP 636: Performed by: OBSTETRICS & GYNECOLOGY

## 2024-10-25 PROCEDURE — 250N000011 HC RX IP 250 OP 636

## 2024-10-25 PROCEDURE — 250N000009 HC RX 250: Performed by: NURSE ANESTHETIST, CERTIFIED REGISTERED

## 2024-10-25 PROCEDURE — 82962 GLUCOSE BLOOD TEST: CPT

## 2024-10-25 PROCEDURE — 258N000003 HC RX IP 258 OP 636: Performed by: NURSE ANESTHETIST, CERTIFIED REGISTERED

## 2024-10-25 PROCEDURE — 84702 CHORIONIC GONADOTROPIN TEST: CPT | Performed by: OBSTETRICS & GYNECOLOGY

## 2024-10-25 PROCEDURE — 82040 ASSAY OF SERUM ALBUMIN: CPT | Performed by: ANESTHESIOLOGY

## 2024-10-25 PROCEDURE — 36415 COLL VENOUS BLD VENIPUNCTURE: CPT | Performed by: OBSTETRICS & GYNECOLOGY

## 2024-10-25 PROCEDURE — 360N000080 HC SURGERY LEVEL 7, PER MIN: Performed by: OBSTETRICS & GYNECOLOGY

## 2024-10-25 PROCEDURE — 88307 TISSUE EXAM BY PATHOLOGIST: CPT | Mod: TC | Performed by: OBSTETRICS & GYNECOLOGY

## 2024-10-25 PROCEDURE — 370N000017 HC ANESTHESIA TECHNICAL FEE, PER MIN: Performed by: OBSTETRICS & GYNECOLOGY

## 2024-10-25 PROCEDURE — 83735 ASSAY OF MAGNESIUM: CPT | Performed by: STUDENT IN AN ORGANIZED HEALTH CARE EDUCATION/TRAINING PROGRAM

## 2024-10-25 PROCEDURE — 250N000009 HC RX 250: Mod: JZ | Performed by: ANESTHESIOLOGY

## 2024-10-25 PROCEDURE — 258N000003 HC RX IP 258 OP 636

## 2024-10-25 PROCEDURE — 58571 TLH W/T/O 250 G OR LESS: CPT | Performed by: ANESTHESIOLOGY

## 2024-10-25 PROCEDURE — 250N000025 HC SEVOFLURANE, PER MIN: Performed by: OBSTETRICS & GYNECOLOGY

## 2024-10-25 PROCEDURE — 250N000011 HC RX IP 250 OP 636: Mod: JW | Performed by: ANESTHESIOLOGY

## 2024-10-25 PROCEDURE — 58571 TLH W/T/O 250 G OR LESS: CPT | Performed by: NURSE ANESTHETIST, CERTIFIED REGISTERED

## 2024-10-25 PROCEDURE — 250N000011 HC RX IP 250 OP 636: Performed by: ANESTHESIOLOGY

## 2024-10-25 PROCEDURE — 64488 TAP BLOCK BI INJECTION: CPT | Mod: XU | Performed by: STUDENT IN AN ORGANIZED HEALTH CARE EDUCATION/TRAINING PROGRAM

## 2024-10-25 PROCEDURE — 250N000009 HC RX 250: Performed by: OBSTETRICS & GYNECOLOGY

## 2024-10-25 PROCEDURE — 250N000013 HC RX MED GY IP 250 OP 250 PS 637: Performed by: OBSTETRICS & GYNECOLOGY

## 2024-10-25 PROCEDURE — 85025 COMPLETE CBC W/AUTO DIFF WBC: CPT | Performed by: OBSTETRICS & GYNECOLOGY

## 2024-10-25 PROCEDURE — 86900 BLOOD TYPING SEROLOGIC ABO: CPT | Performed by: OBSTETRICS & GYNECOLOGY

## 2024-10-25 PROCEDURE — 36620 INSERTION CATHETER ARTERY: CPT | Mod: 59 | Performed by: ANESTHESIOLOGY

## 2024-10-25 PROCEDURE — 99205 OFFICE O/P NEW HI 60 MIN: CPT | Performed by: STUDENT IN AN ORGANIZED HEALTH CARE EDUCATION/TRAINING PROGRAM

## 2024-10-25 PROCEDURE — 58571 TLH W/T/O 250 G OR LESS: CPT | Mod: GC | Performed by: OBSTETRICS & GYNECOLOGY

## 2024-10-25 PROCEDURE — 250N000013 HC RX MED GY IP 250 OP 250 PS 637

## 2024-10-25 PROCEDURE — S2900 ROBOTIC SURGICAL SYSTEM: HCPCS | Mod: GC | Performed by: OBSTETRICS & GYNECOLOGY

## 2024-10-25 PROCEDURE — 83735 ASSAY OF MAGNESIUM: CPT

## 2024-10-25 PROCEDURE — 999N000141 HC STATISTIC PRE-PROCEDURE NURSING ASSESSMENT: Performed by: OBSTETRICS & GYNECOLOGY

## 2024-10-25 PROCEDURE — 86901 BLOOD TYPING SEROLOGIC RH(D): CPT | Performed by: OBSTETRICS & GYNECOLOGY

## 2024-10-25 PROCEDURE — 64488 TAP BLOCK BI INJECTION: CPT | Mod: 59 | Performed by: ANESTHESIOLOGY

## 2024-10-25 PROCEDURE — 710N000010 HC RECOVERY PHASE 1, LEVEL 2, PER MIN: Performed by: OBSTETRICS & GYNECOLOGY

## 2024-10-25 PROCEDURE — 86923 COMPATIBILITY TEST ELECTRIC: CPT | Performed by: OBSTETRICS & GYNECOLOGY

## 2024-10-25 PROCEDURE — 88307 TISSUE EXAM BY PATHOLOGIST: CPT | Mod: 26 | Performed by: PATHOLOGY

## 2024-10-25 PROCEDURE — 84100 ASSAY OF PHOSPHORUS: CPT | Performed by: STUDENT IN AN ORGANIZED HEALTH CARE EDUCATION/TRAINING PROGRAM

## 2024-10-25 PROCEDURE — 82435 ASSAY OF BLOOD CHLORIDE: CPT

## 2024-10-25 PROCEDURE — 272N000001 HC OR GENERAL SUPPLY STERILE: Performed by: OBSTETRICS & GYNECOLOGY

## 2024-10-25 PROCEDURE — 36415 COLL VENOUS BLD VENIPUNCTURE: CPT

## 2024-10-25 RX ORDER — FENTANYL CITRATE 50 UG/ML
50 INJECTION, SOLUTION INTRAMUSCULAR; INTRAVENOUS EVERY 5 MIN PRN
Status: DISCONTINUED | OUTPATIENT
Start: 2024-10-25 | End: 2024-10-25 | Stop reason: HOSPADM

## 2024-10-25 RX ORDER — MAGNESIUM SULFATE HEPTAHYDRATE 40 MG/ML
4 INJECTION, SOLUTION INTRAVENOUS ONCE
Status: DISCONTINUED | OUTPATIENT
Start: 2024-10-25 | End: 2024-10-25 | Stop reason: HOSPADM

## 2024-10-25 RX ORDER — PROPOFOL 10 MG/ML
INJECTION, EMULSION INTRAVENOUS CONTINUOUS PRN
Status: DISCONTINUED | OUTPATIENT
Start: 2024-10-25 | End: 2024-10-25

## 2024-10-25 RX ORDER — TRAMADOL HYDROCHLORIDE 50 MG/1
50 TABLET ORAL EVERY 6 HOURS PRN
Status: DISCONTINUED | OUTPATIENT
Start: 2024-10-25 | End: 2024-10-26 | Stop reason: HOSPADM

## 2024-10-25 RX ORDER — BISACODYL 10 MG
10 SUPPOSITORY, RECTAL RECTAL DAILY PRN
Status: DISCONTINUED | OUTPATIENT
Start: 2024-10-25 | End: 2024-10-26 | Stop reason: HOSPADM

## 2024-10-25 RX ORDER — MAGNESIUM HYDROXIDE 1200 MG/15ML
LIQUID ORAL PRN
Status: DISCONTINUED | OUTPATIENT
Start: 2024-10-25 | End: 2024-10-25 | Stop reason: HOSPADM

## 2024-10-25 RX ORDER — DEXAMETHASONE SODIUM PHOSPHATE 10 MG/ML
INJECTION, SOLUTION INTRAMUSCULAR; INTRAVENOUS
Status: COMPLETED | OUTPATIENT
Start: 2024-10-25 | End: 2024-10-25

## 2024-10-25 RX ORDER — SODIUM CHLORIDE, SODIUM LACTATE, POTASSIUM CHLORIDE, CALCIUM CHLORIDE 600; 310; 30; 20 MG/100ML; MG/100ML; MG/100ML; MG/100ML
INJECTION, SOLUTION INTRAVENOUS CONTINUOUS
Status: DISCONTINUED | OUTPATIENT
Start: 2024-10-25 | End: 2024-10-25 | Stop reason: HOSPADM

## 2024-10-25 RX ORDER — DEXAMETHASONE SODIUM PHOSPHATE 4 MG/ML
INJECTION, SOLUTION INTRA-ARTICULAR; INTRALESIONAL; INTRAMUSCULAR; INTRAVENOUS; SOFT TISSUE PRN
Status: DISCONTINUED | OUTPATIENT
Start: 2024-10-25 | End: 2024-10-25

## 2024-10-25 RX ORDER — FAMOTIDINE 20 MG/1
20 TABLET, FILM COATED ORAL DAILY
Status: DISCONTINUED | OUTPATIENT
Start: 2024-10-26 | End: 2024-10-26 | Stop reason: HOSPADM

## 2024-10-25 RX ORDER — FENTANYL CITRATE 50 UG/ML
25-50 INJECTION, SOLUTION INTRAMUSCULAR; INTRAVENOUS
Status: DISCONTINUED | OUTPATIENT
Start: 2024-10-25 | End: 2024-10-25 | Stop reason: HOSPADM

## 2024-10-25 RX ORDER — SODIUM CHLORIDE, SODIUM LACTATE, POTASSIUM CHLORIDE, CALCIUM CHLORIDE 600; 310; 30; 20 MG/100ML; MG/100ML; MG/100ML; MG/100ML
INJECTION, SOLUTION INTRAVENOUS CONTINUOUS PRN
Status: DISCONTINUED | OUTPATIENT
Start: 2024-10-25 | End: 2024-10-25

## 2024-10-25 RX ORDER — FLUMAZENIL 0.1 MG/ML
0.2 INJECTION, SOLUTION INTRAVENOUS
Status: DISCONTINUED | OUTPATIENT
Start: 2024-10-25 | End: 2024-10-25 | Stop reason: HOSPADM

## 2024-10-25 RX ORDER — ONDANSETRON 4 MG/1
4 TABLET, ORALLY DISINTEGRATING ORAL EVERY 6 HOURS PRN
Status: DISCONTINUED | OUTPATIENT
Start: 2024-10-25 | End: 2024-10-26 | Stop reason: HOSPADM

## 2024-10-25 RX ORDER — TRAMADOL HYDROCHLORIDE 50 MG/1
50 TABLET ORAL EVERY 6 HOURS PRN
Qty: 6 TABLET | Refills: 0 | Status: SHIPPED | OUTPATIENT
Start: 2024-10-25

## 2024-10-25 RX ORDER — ACETAMINOPHEN 325 MG/1
975 TABLET ORAL ONCE
Status: COMPLETED | OUTPATIENT
Start: 2024-10-25 | End: 2024-10-25

## 2024-10-25 RX ORDER — KETOROLAC TROMETHAMINE 30 MG/ML
INJECTION, SOLUTION INTRAMUSCULAR; INTRAVENOUS PRN
Status: DISCONTINUED | OUTPATIENT
Start: 2024-10-25 | End: 2024-10-25

## 2024-10-25 RX ORDER — KETOROLAC TROMETHAMINE 15 MG/ML
15 INJECTION, SOLUTION INTRAMUSCULAR; INTRAVENOUS EVERY 6 HOURS
Status: DISCONTINUED | OUTPATIENT
Start: 2024-10-25 | End: 2024-10-26 | Stop reason: HOSPADM

## 2024-10-25 RX ORDER — FENTANYL CITRATE 50 UG/ML
INJECTION, SOLUTION INTRAMUSCULAR; INTRAVENOUS PRN
Status: DISCONTINUED | OUTPATIENT
Start: 2024-10-25 | End: 2024-10-25

## 2024-10-25 RX ORDER — APREPITANT 40 MG/1
40 CAPSULE ORAL ONCE
Status: COMPLETED | OUTPATIENT
Start: 2024-10-25 | End: 2024-10-25

## 2024-10-25 RX ORDER — HYDROMORPHONE HYDROCHLORIDE 2 MG/1
2 TABLET ORAL
Status: DISCONTINUED | OUTPATIENT
Start: 2024-10-25 | End: 2024-10-26 | Stop reason: HOSPADM

## 2024-10-25 RX ORDER — ONDANSETRON 4 MG/1
4 TABLET, ORALLY DISINTEGRATING ORAL EVERY 30 MIN PRN
Status: DISCONTINUED | OUTPATIENT
Start: 2024-10-25 | End: 2024-10-25 | Stop reason: HOSPADM

## 2024-10-25 RX ORDER — HYDROMORPHONE HYDROCHLORIDE 1 MG/ML
0.2 INJECTION, SOLUTION INTRAMUSCULAR; INTRAVENOUS; SUBCUTANEOUS EVERY 5 MIN PRN
Status: DISCONTINUED | OUTPATIENT
Start: 2024-10-25 | End: 2024-10-25 | Stop reason: HOSPADM

## 2024-10-25 RX ORDER — NALOXONE HYDROCHLORIDE 0.4 MG/ML
0.1 INJECTION, SOLUTION INTRAMUSCULAR; INTRAVENOUS; SUBCUTANEOUS
Status: DISCONTINUED | OUTPATIENT
Start: 2024-10-25 | End: 2024-10-25 | Stop reason: HOSPADM

## 2024-10-25 RX ORDER — DIAZEPAM 10 MG/2ML
1.25 INJECTION, SOLUTION INTRAMUSCULAR; INTRAVENOUS ONCE
Status: COMPLETED | OUTPATIENT
Start: 2024-10-25 | End: 2024-10-25

## 2024-10-25 RX ORDER — FENTANYL CITRATE 50 UG/ML
25 INJECTION, SOLUTION INTRAMUSCULAR; INTRAVENOUS EVERY 5 MIN PRN
Status: DISCONTINUED | OUTPATIENT
Start: 2024-10-25 | End: 2024-10-25 | Stop reason: HOSPADM

## 2024-10-25 RX ORDER — MAGNESIUM SULFATE HEPTAHYDRATE 40 MG/ML
2 INJECTION, SOLUTION INTRAVENOUS ONCE
Status: DISCONTINUED | OUTPATIENT
Start: 2024-10-25 | End: 2024-10-26 | Stop reason: HOSPADM

## 2024-10-25 RX ORDER — ACETAMINOPHEN 325 MG/1
975 TABLET ORAL ONCE
Status: DISCONTINUED | OUTPATIENT
Start: 2024-10-25 | End: 2024-10-25

## 2024-10-25 RX ORDER — DEXMEDETOMIDINE HYDROCHLORIDE 4 UG/ML
INJECTION, SOLUTION INTRAVENOUS
Status: COMPLETED | OUTPATIENT
Start: 2024-10-25 | End: 2024-10-25

## 2024-10-25 RX ORDER — NALOXONE HYDROCHLORIDE 0.4 MG/ML
0.2 INJECTION, SOLUTION INTRAMUSCULAR; INTRAVENOUS; SUBCUTANEOUS
Status: DISCONTINUED | OUTPATIENT
Start: 2024-10-25 | End: 2024-10-26 | Stop reason: HOSPADM

## 2024-10-25 RX ORDER — DEXAMETHASONE SODIUM PHOSPHATE 4 MG/ML
4 INJECTION, SOLUTION INTRA-ARTICULAR; INTRALESIONAL; INTRAMUSCULAR; INTRAVENOUS; SOFT TISSUE
Status: DISCONTINUED | OUTPATIENT
Start: 2024-10-25 | End: 2024-10-25 | Stop reason: HOSPADM

## 2024-10-25 RX ORDER — ONDANSETRON 2 MG/ML
4 INJECTION INTRAMUSCULAR; INTRAVENOUS EVERY 30 MIN PRN
Status: DISCONTINUED | OUTPATIENT
Start: 2024-10-25 | End: 2024-10-25 | Stop reason: HOSPADM

## 2024-10-25 RX ORDER — LABETALOL HYDROCHLORIDE 5 MG/ML
10 INJECTION, SOLUTION INTRAVENOUS
Status: DISCONTINUED | OUTPATIENT
Start: 2024-10-25 | End: 2024-10-25 | Stop reason: HOSPADM

## 2024-10-25 RX ORDER — FLUTICASONE PROPIONATE 50 MCG
2 SPRAY, SUSPENSION (ML) NASAL DAILY
Status: DISCONTINUED | OUTPATIENT
Start: 2024-10-26 | End: 2024-10-26 | Stop reason: HOSPADM

## 2024-10-25 RX ORDER — ACETAMINOPHEN 325 MG/1
975 TABLET ORAL EVERY 6 HOURS
Status: DISCONTINUED | OUTPATIENT
Start: 2024-10-25 | End: 2024-10-26 | Stop reason: HOSPADM

## 2024-10-25 RX ORDER — METRONIDAZOLE 500 MG/100ML
500 INJECTION, SOLUTION INTRAVENOUS ONCE
Status: COMPLETED | OUTPATIENT
Start: 2024-10-25 | End: 2024-10-25

## 2024-10-25 RX ORDER — SODIUM PHOSPHATE,MONO-DIBASIC 19G-7G/118
1 ENEMA (ML) RECTAL
Status: DISCONTINUED | OUTPATIENT
Start: 2024-10-25 | End: 2024-10-26 | Stop reason: HOSPADM

## 2024-10-25 RX ORDER — NALOXONE HYDROCHLORIDE 0.4 MG/ML
0.4 INJECTION, SOLUTION INTRAMUSCULAR; INTRAVENOUS; SUBCUTANEOUS
Status: DISCONTINUED | OUTPATIENT
Start: 2024-10-25 | End: 2024-10-26 | Stop reason: HOSPADM

## 2024-10-25 RX ORDER — HYDROMORPHONE HYDROCHLORIDE 1 MG/ML
0.4 INJECTION, SOLUTION INTRAMUSCULAR; INTRAVENOUS; SUBCUTANEOUS EVERY 5 MIN PRN
Status: DISCONTINUED | OUTPATIENT
Start: 2024-10-25 | End: 2024-10-25 | Stop reason: HOSPADM

## 2024-10-25 RX ORDER — FLUDROCORTISONE ACETATE 0.1 MG/1
0.1 TABLET ORAL DAILY
Status: DISCONTINUED | OUTPATIENT
Start: 2024-10-26 | End: 2024-10-26 | Stop reason: HOSPADM

## 2024-10-25 RX ORDER — POLYETHYLENE GLYCOL 3350 17 G/17G
17 POWDER, FOR SOLUTION ORAL DAILY
Status: DISCONTINUED | OUTPATIENT
Start: 2024-10-26 | End: 2024-10-26 | Stop reason: HOSPADM

## 2024-10-25 RX ORDER — CEFAZOLIN SODIUM/WATER 2 G/20 ML
2 SYRINGE (ML) INTRAVENOUS SEE ADMIN INSTRUCTIONS
Status: DISCONTINUED | OUTPATIENT
Start: 2024-10-25 | End: 2024-10-25 | Stop reason: HOSPADM

## 2024-10-25 RX ORDER — NALOXONE HYDROCHLORIDE 0.4 MG/ML
0.2 INJECTION, SOLUTION INTRAMUSCULAR; INTRAVENOUS; SUBCUTANEOUS
Status: DISCONTINUED | OUTPATIENT
Start: 2024-10-25 | End: 2024-10-25 | Stop reason: HOSPADM

## 2024-10-25 RX ORDER — LORATADINE 10 MG/1
10 TABLET ORAL 2 TIMES DAILY
Status: DISCONTINUED | OUTPATIENT
Start: 2024-10-25 | End: 2024-10-26 | Stop reason: HOSPADM

## 2024-10-25 RX ORDER — LIDOCAINE HYDROCHLORIDE 20 MG/ML
INJECTION, SOLUTION INFILTRATION; PERINEURAL PRN
Status: DISCONTINUED | OUTPATIENT
Start: 2024-10-25 | End: 2024-10-25

## 2024-10-25 RX ORDER — HYDROXYZINE HYDROCHLORIDE 25 MG/1
25 TABLET, FILM COATED ORAL EVERY 6 HOURS PRN
Status: DISCONTINUED | OUTPATIENT
Start: 2024-10-25 | End: 2024-10-26 | Stop reason: HOSPADM

## 2024-10-25 RX ORDER — ACETAMINOPHEN 325 MG/1
650 TABLET ORAL EVERY 6 HOURS
Status: DISCONTINUED | OUTPATIENT
Start: 2024-10-28 | End: 2024-10-26 | Stop reason: HOSPADM

## 2024-10-25 RX ORDER — ONDANSETRON 2 MG/ML
4 INJECTION INTRAMUSCULAR; INTRAVENOUS EVERY 6 HOURS PRN
Status: DISCONTINUED | OUTPATIENT
Start: 2024-10-25 | End: 2024-10-26 | Stop reason: HOSPADM

## 2024-10-25 RX ORDER — BUPIVACAINE HYDROCHLORIDE 2.5 MG/ML
INJECTION, SOLUTION EPIDURAL; INFILTRATION; INTRACAUDAL
Status: COMPLETED | OUTPATIENT
Start: 2024-10-25 | End: 2024-10-25

## 2024-10-25 RX ORDER — LIDOCAINE 40 MG/G
CREAM TOPICAL
Status: DISCONTINUED | OUTPATIENT
Start: 2024-10-25 | End: 2024-10-25 | Stop reason: HOSPADM

## 2024-10-25 RX ORDER — NALOXONE HYDROCHLORIDE 0.4 MG/ML
0.4 INJECTION, SOLUTION INTRAMUSCULAR; INTRAVENOUS; SUBCUTANEOUS
Status: DISCONTINUED | OUTPATIENT
Start: 2024-10-25 | End: 2024-10-25 | Stop reason: HOSPADM

## 2024-10-25 RX ORDER — CEFAZOLIN SODIUM/WATER 2 G/20 ML
2 SYRINGE (ML) INTRAVENOUS
Status: COMPLETED | OUTPATIENT
Start: 2024-10-25 | End: 2024-10-25

## 2024-10-25 RX ORDER — ONDANSETRON 2 MG/ML
INJECTION INTRAMUSCULAR; INTRAVENOUS PRN
Status: DISCONTINUED | OUTPATIENT
Start: 2024-10-25 | End: 2024-10-25

## 2024-10-25 RX ORDER — LIDOCAINE 40 MG/G
CREAM TOPICAL
Status: DISCONTINUED | OUTPATIENT
Start: 2024-10-25 | End: 2024-10-26 | Stop reason: HOSPADM

## 2024-10-25 RX ORDER — PROPOFOL 10 MG/ML
INJECTION, EMULSION INTRAVENOUS PRN
Status: DISCONTINUED | OUTPATIENT
Start: 2024-10-25 | End: 2024-10-25

## 2024-10-25 RX ORDER — LEVOCETIRIZINE DIHYDROCHLORIDE 5 MG/1
5 TABLET, FILM COATED ORAL 2 TIMES DAILY
Status: DISCONTINUED | OUTPATIENT
Start: 2024-10-25 | End: 2024-10-26 | Stop reason: HOSPADM

## 2024-10-25 RX ADMIN — BUPIVACAINE HYDROCHLORIDE 50 ML: 2.5 INJECTION, SOLUTION EPIDURAL; INFILTRATION; INTRACAUDAL; PERINEURAL at 08:20

## 2024-10-25 RX ADMIN — HYDROMORPHONE HYDROCHLORIDE 0.25 MG: 1 INJECTION, SOLUTION INTRAMUSCULAR; INTRAVENOUS; SUBCUTANEOUS at 11:21

## 2024-10-25 RX ADMIN — PROPOFOL 200 MG: 10 INJECTION, EMULSION INTRAVENOUS at 08:05

## 2024-10-25 RX ADMIN — DEXMEDETOMIDINE HYDROCHLORIDE 40 MCG: 100 INJECTION, SOLUTION INTRAVENOUS at 08:20

## 2024-10-25 RX ADMIN — FENTANYL CITRATE 50 MCG: 50 INJECTION INTRAMUSCULAR; INTRAVENOUS at 08:50

## 2024-10-25 RX ADMIN — SUGAMMADEX 200 MG: 100 INJECTION, SOLUTION INTRAVENOUS at 11:01

## 2024-10-25 RX ADMIN — SODIUM CHLORIDE 500 ML: 9 INJECTION, SOLUTION INTRAVENOUS at 18:00

## 2024-10-25 RX ADMIN — KETOROLAC TROMETHAMINE 15 MG: 30 INJECTION, SOLUTION INTRAMUSCULAR at 10:57

## 2024-10-25 RX ADMIN — LIDOCAINE HYDROCHLORIDE 100 MG: 20 INJECTION, SOLUTION INFILTRATION; PERINEURAL at 08:04

## 2024-10-25 RX ADMIN — FENTANYL CITRATE 25 MCG: 50 INJECTION INTRAMUSCULAR; INTRAVENOUS at 11:21

## 2024-10-25 RX ADMIN — ACETAMINOPHEN 975 MG: 325 TABLET, FILM COATED ORAL at 17:29

## 2024-10-25 RX ADMIN — DEXMEDETOMIDINE HYDROCHLORIDE 4 MCG: 100 INJECTION, SOLUTION INTRAVENOUS at 10:21

## 2024-10-25 RX ADMIN — MIDAZOLAM 2 MG: 1 INJECTION INTRAMUSCULAR; INTRAVENOUS at 07:56

## 2024-10-25 RX ADMIN — HYDROMORPHONE HYDROCHLORIDE 0.25 MG: 1 INJECTION, SOLUTION INTRAMUSCULAR; INTRAVENOUS; SUBCUTANEOUS at 09:22

## 2024-10-25 RX ADMIN — PROPOFOL 50 MCG/KG/MIN: 10 INJECTION, EMULSION INTRAVENOUS at 08:16

## 2024-10-25 RX ADMIN — SODIUM CHLORIDE, POTASSIUM CHLORIDE, SODIUM LACTATE AND CALCIUM CHLORIDE: 600; 310; 30; 20 INJECTION, SOLUTION INTRAVENOUS at 09:33

## 2024-10-25 RX ADMIN — DEXAMETHASONE SODIUM PHOSPHATE 10 MG: 4 INJECTION, SOLUTION INTRAMUSCULAR; INTRAVENOUS at 08:35

## 2024-10-25 RX ADMIN — Medication 2 G: at 08:14

## 2024-10-25 RX ADMIN — PHENYLEPHRINE HYDROCHLORIDE 100 MCG: 10 INJECTION INTRAVENOUS at 08:39

## 2024-10-25 RX ADMIN — HYDROXYZINE HYDROCHLORIDE 25 MG: 25 TABLET, FILM COATED ORAL at 20:16

## 2024-10-25 RX ADMIN — ACETAMINOPHEN 975 MG: 325 TABLET, FILM COATED ORAL at 07:42

## 2024-10-25 RX ADMIN — ONDANSETRON 4 MG: 2 INJECTION INTRAMUSCULAR; INTRAVENOUS at 12:50

## 2024-10-25 RX ADMIN — Medication 30 MG: at 09:07

## 2024-10-25 RX ADMIN — LEVOCETIRIZINE DIHYDROCHLORIDE 5 MG: 5 TABLET ORAL at 20:11

## 2024-10-25 RX ADMIN — Medication 50 MG: at 08:06

## 2024-10-25 RX ADMIN — METRONIDAZOLE 500 MG: 500 INJECTION, SOLUTION INTRAVENOUS at 08:15

## 2024-10-25 RX ADMIN — ONDANSETRON 4 MG: 2 INJECTION INTRAMUSCULAR; INTRAVENOUS at 10:57

## 2024-10-25 RX ADMIN — FENTANYL CITRATE 25 MCG: 50 INJECTION INTRAMUSCULAR; INTRAVENOUS at 10:59

## 2024-10-25 RX ADMIN — KETOROLAC TROMETHAMINE 15 MG: 15 INJECTION, SOLUTION INTRAMUSCULAR; INTRAVENOUS at 22:04

## 2024-10-25 RX ADMIN — LORATADINE 10 MG: 10 TABLET ORAL at 20:11

## 2024-10-25 RX ADMIN — DEXAMETHASONE SODIUM PHOSPHATE 10 MG: 4 INJECTION, SOLUTION INTRA-ARTICULAR; INTRALESIONAL; INTRAMUSCULAR; INTRAVENOUS; SOFT TISSUE at 08:40

## 2024-10-25 RX ADMIN — FENTANYL CITRATE 100 MCG: 50 INJECTION INTRAMUSCULAR; INTRAVENOUS at 08:04

## 2024-10-25 RX ADMIN — HYDROMORPHONE HYDROCHLORIDE 0.2 MG: 1 INJECTION, SOLUTION INTRAMUSCULAR; INTRAVENOUS; SUBCUTANEOUS at 12:41

## 2024-10-25 RX ADMIN — PHENYLEPHRINE HYDROCHLORIDE 0.2 MCG/KG/MIN: 10 INJECTION INTRAVENOUS at 08:40

## 2024-10-25 RX ADMIN — APREPITANT 40 MG: 40 CAPSULE ORAL at 07:43

## 2024-10-25 RX ADMIN — Medication 10 MG: at 10:21

## 2024-10-25 RX ADMIN — DIAZEPAM 1.25 MG: 5 INJECTION INTRAMUSCULAR; INTRAVENOUS at 12:00

## 2024-10-25 RX ADMIN — SODIUM CHLORIDE, POTASSIUM CHLORIDE, SODIUM LACTATE AND CALCIUM CHLORIDE: 600; 310; 30; 20 INJECTION, SOLUTION INTRAVENOUS at 07:56

## 2024-10-25 RX ADMIN — DEXAMETHASONE SODIUM PHOSPHATE 2 MG: 10 INJECTION, SOLUTION INTRAMUSCULAR; INTRAVENOUS at 08:20

## 2024-10-25 ASSESSMENT — ACTIVITIES OF DAILY LIVING (ADL)
ADLS_ACUITY_SCORE: 0

## 2024-10-25 NOTE — ANESTHESIA CARE TRANSFER NOTE
Patient: Damaris Sarmiento    Procedure: Procedure(s):  HYSTERECTOMY, TOTAL, ROBOT-ASSISTED, LAPAROSCOPIC, WITH BILATERAL SALPINGO-OOPHORECTOMY AND  CYSTOSCOPY       Diagnosis: Dysmenorrhea [N94.6]  Diagnosis Additional Information: No value filed.    Anesthesia Type:   General     Note:    Oropharynx: oropharynx clear of all foreign objects and spontaneously breathing  Level of Consciousness: awake  Oxygen Supplementation: face mask  Level of Supplemental Oxygen (L/min / FiO2): 6  Independent Airway: airway patency satisfactory and stable  Dentition: dentition unchanged  Vital Signs Stable: post-procedure vital signs reviewed and stable  Report to RN Given: handoff report given  Patient transferred to: PACU    Handoff Report: Identifed the Patient, Identified the Reponsible Provider, Reviewed the pertinent medical history, Discussed the surgical course, Reviewed Intra-OP anesthesia mangement and issues during anesthesia, Set expectations for post-procedure period and Allowed opportunity for questions and acknowledgement of understanding      Vitals:  Vitals Value Taken Time   BP 98/78 10/25/24 1118   Temp     Pulse 86 10/25/24 1124   Resp     SpO2 96 % 10/25/24 1124   Vitals shown include unfiled device data.    Electronically Signed By: ANGELIKA Eldridge CRNA  October 25, 2024  11:25 AM

## 2024-10-25 NOTE — BRIEF OP NOTE
[facility]    Brief Operative Note    Pre-operative diagnosis: Dysmenorrhea [N94.6]  Post-operative diagnosis Same as pre-operative diagnosis    Procedure: HYSTERECTOMY, TOTAL, ROBOT-ASSISTED, LAPAROSCOPIC, WITH BILATERAL SALPINGO-OOPHORECTOMY AND, N/A - Abdomen  CYSTOSCOPY, N/A - Urethra    Surgeon: Surgeons and Role:     * Joslyn Dunn MD - Primary     * Maida Steele MD - Resident - Assisting  Anesthesia: General with Block     Estimated blood loss:  75 mL  Urine output: 900 mL clear urine  IV fluids: 1500 mL crystalloid    Drains: None  Specimens:   ID Type Source Tests Collected by Time Destination   1 : uterus, bilaterl fallopian tubes, cervix, and bilateral ovaries Tissue Uterus, Cervix, Bilateral Fallopian Tubes & Ovaries SURGICAL PATHOLOGY EXAM Joslyn Dunn MD 10/25/2024 10:14 AM      Findings: No evidence of injury on entry. Normal appearing liver, stomach, diaphragms, colon. Right anterior fundal intramural uterine fibroid. Simple appearing left ovarian cyst, approximately 5 cm, and small ~1 cm left paratubal cyst. Otherwise normal appearing uterus, bilateral ovaries, bilateral fallopian tubes. Surgical sites hemostatic at end of case. Cystoscopy revealed brisk efflux from bilateral UOs and no evidence of bladder injury.    Complications: None.  Implants: * No implants in log *

## 2024-10-25 NOTE — DISCHARGE INSTRUCTIONS
To contact a doctor, call Dr. Dunn, OB/GYN, [phone]  or:  '   [phone] and ask for the Resident On Call for          OB/GYN (answered 24 hours a day)  '   Emergency Department:  [facility]'s Emergency Department:  [phone]

## 2024-10-25 NOTE — PROGRESS NOTES
Anesthesia (Dr. Reyes) texted and notified that pt is asymptomatic; however has been exhibiting frequent PVC's (trigeminy).

## 2024-10-25 NOTE — CONSULTS
[facility]  Consult Note - Hospitalist Service  Date of Admission:  10/25/2024  Consult Requested by: Dr. Dunn  Reason for Consult: Co-medical management/ Frequent PVCs    Assessment & Plan   Damaris Sarmiento is a 49 year old female admitted on 10/25/2024. She  has a history of deep vein thrombosis (DVT) secondary to thoracic outlet syndrome and postural orthostatic tachycardia syndrome (POTS) managed with Florinef. She was admitted following a hysterectomy and had preoperative premature ventricular contractions, for which medicine was consulted.     #Frequent PVCs  #Hx of POTs    -- No active chest pain at this time, though she was reported to have trigeminy preoperatively. Her EKG on 10/25 showed sinus rhythm with frequent PVCs and low-voltage QRS complexes. The etiology of these findings remains unclear.  -- Electrolytes: Potassium 3.8, calcium 9.0, (will order a Phos + magnesium level)  -- telemetry overnight to assess the frequency of PVCs, any rhythm progression, and overall cardiac -- repeat EKG for the morning to assess for any changes in the frequency or morphology of PVCs.  -- AM labs to monitor electrolytes, specifically potassium and magnesium  -- obtaining an echocardiogram to evaluate her cardiac function and structure, given the low-voltage QRS complexes and frequent PVCs.  -- PVCs persist and become symptomatic, I may initiate a low-dose beta blocker or calcium channel blocker. (will be cautious due to her history of POTS and potential impact on blood pressure and heart rate.) review of her vitals show pulse remains below 100.  Although she did have 1 episode of 107.  -- PTA POTS regimen with Florinef but will monitor for any cardiovascular effects that could influence management.    --  reassess in 24 hours or sooner if symptoms such as chest pain, palpitations, or hemodynamic instability develop.  -- Consider consult to cardiology if her PVCs remain  "frequent or if new findings suggest an underlying structural heart disease.    # History of upper extremity deep vein thrombosis secondary to thoracic outlet syndrome  -- On aspirin currently on hold  -- Defer to primary team for resumption    # Status post hysterectomy  -- Management per primary team             Clinically Significant Risk Factors Present on Admission                 # Drug Induced Platelet Defect: home medication list includes an antiplatelet medication           # Severe Obesity: Estimated body mass index is 40.54 kg/m  as calculated from the following:    Height as of this encounter: 1.6 m (5' 3\").    Weight as of this encounter: 103.8 kg (228 lb 13.4 oz).              Shelia Hurd MD  Hospitalist Service  Securely message with [employer] (more info)  Text page via [facility] Paging/Directory   ______________________________________________________________________    Chief Complaint   Frequent PVCs    History is obtained from the patient    History of Present Illness     Damaris Sarmiento is a 49 year old female admitted on 10/25/2024. She  has a history of deep vein thrombosis (DVT) secondary to thoracic outlet syndrome and postural orthostatic tachycardia syndrome (POTS) managed with Florinef. She was admitted following a hysterectomy and had preoperative premature ventricular contractions, for which medicine was consulted.     On my evaluation, patient is comfortable laying in bed.  She is joined by her  at bedside.  Patient states she is not short of breath, does not have any palpitations.  Patient states when the monitor goes off (for PVCs) she does not have palpitations or chest pain.  She denies any headache lightheadedness dizziness or chest pain at this time.      Past Medical History    Past Medical History:   Diagnosis Date    Alternating constipation and diarrhea 08/20/2022    Asthma     Discoid lupus     Diverticulosis of large intestine 06/2021    FOUND SCREENING SCOPE, " TRANSVERSE/DESCENDING    Dysmenorrhea 08/20/2022    GERD (gastroesophageal reflux disease) 10/04/2012    Menorrhagia with regular cycle 07/26/2019    Polycystic ovarian syndrome     PONV (postoperative nausea and vomiting)     POTS (postural orthostatic tachycardia syndrome) 08/2021    RW ALLERGIES/IMMUNOLOGY (ABSTRACTED)     Sinusitis     Subclavian vein thrombosis, right (H) 2000    Thoracic outlet syndrome associated with cervical rib 08/20/2022       Past Surgical History   Past Surgical History:   Procedure Laterality Date    BIOPSY      Sinus surgery 7/2014; Flex Sigmoidoscopy 2013    ENDOSCOPIC SINUS SURGERY  07/16/2014    Procedure: ENDOSCOPIC SINUS SURGERY;  Surgeon: Suzi Vieyra MD;  Location: [facility]    ENT SURGERY  July 2014    bilateral etmoodectomy&maxillary anstronomy,turbinate resect    EP STUDY TILT TABLE N/A 08/20/2021    Procedure: EP TILT TABLE;  Surgeon: Ilya Gilmore MD;  Location:  [facility]ARD[facility] CATH LAB    EYE SURGERY  [id number]    PRK LASIK, Left eye May2924, Right Eye May2015    ORTHOPEDIC SURGERY      SOFT TISSUE SURGERY  5507-9368    Several venoplasty balloon-R subclavian vein    THORACIC SURGERY  12/2000    resection of R first rib-subclavian vein clot thoracic outle    TURBINECTOMY  07/16/2014    Procedure: TURBINECTOMY;  Surgeon: Suzi Vieyra MD;  Location: [facility]    ZZC NONSPECIFIC PROCEDURE  12/01/2000    resection of first rib    ZZC NONSPECIFIC PROCEDURE  01/01/1992    left foot surgery       Medications   I have reviewed this patient's current medications       Review of Systems    The 10 point Review of Systems is negative other than noted in the HPI or here.      Physical Exam   Vital Signs: Temp: 97.9  F (36.6  C) Temp src: Oral BP: (!) 149/83 Pulse: 90   Resp: 20 SpO2: 95 % O2 Device: None (Room air) Oxygen Delivery: 1 LPM  Weight: 228 lbs 13.4 oz    General Appearance: Appears comfortable.  Respiratory: Without wheezes rhonchi or rales.   CTA  Cardiovascular: RRR, without murmurs  GI: Soft, nontender, plus BS  Skin: Without obvious bleeding, bruising or excoriations      Medical Decision Making       78 MINUTES SPENT BY ME on the date of service doing chart review, history, exam, documentation & further activities per the note.      Data   ------------------------- PAST 24 HR DATA REVIEWED -----------------------------------------------

## 2024-10-25 NOTE — ANESTHESIA POSTPROCEDURE EVALUATION
"Patient: Damaris Sarmiento    Procedure: Procedure(s):  HYSTERECTOMY, TOTAL, ROBOT-ASSISTED, LAPAROSCOPIC, WITH BILATERAL SALPINGO-OOPHORECTOMY AND  CYSTOSCOPY       Anesthesia Type:  General    Note:  Disposition: Inpatient   Postop Pain Control: Uneventful            Sign Out: Well controlled pain   PONV:    Neuro/Psych: Uneventful            Sign Out: Acceptable/Baseline neuro status   Airway/Respiratory: Uneventful            Sign Out: Acceptable/Baseline resp. status   CV/Hemodynamics: Uneventful            Sign Out: Acceptable CV status; No obvious hypovolemia; No obvious fluid overload   Other NRE: NONE   DID A NON-ROUTINE EVENT OCCUR?            Last vitals:  Vitals Value Taken Time   /73 10/25/24 1500   Temp 36.4  C (97.5  F) 10/25/24 1118   Pulse 87 10/25/24 1528   Resp 17 10/25/24 1528   SpO2 96 % 10/25/24 1528   Vitals shown include unfiled device data.    Patient Vitals for the past 24 hrs:   BP Temp Temp src Pulse Resp SpO2 Height Weight   10/25/24 1500 128/73 -- -- 81 13 94 % -- --   10/25/24 1445 135/77 -- -- 82 14 95 % -- --   10/25/24 1430 135/78 -- -- 79 12 98 % -- --   10/25/24 1415 138/83 -- -- 84 16 93 % -- --   10/25/24 1400 130/76 -- -- 84 13 96 % -- --   10/25/24 1345 118/62 -- -- 81 11 97 % -- --   10/25/24 1330 133/78 -- -- 79 13 98 % -- --   10/25/24 1315 122/79 -- -- 80 11 98 % -- --   10/25/24 1300 123/76 -- -- 81 12 97 % -- --   10/25/24 1215 126/79 -- -- 82 12 96 % -- --   10/25/24 1200 134/78 -- -- 85 15 96 % -- --   10/25/24 1145 137/67 -- -- 85 14 93 % -- --   10/25/24 1130 132/70 -- -- 80 16 94 % -- --   10/25/24 1118 98/78 36.4  C (97.5  F) Axillary -- 12 98 % -- --   10/25/24 0546 (!) 158/82 36.8  C (98.3  F) Oral -- 18 99 % 1.6 m (5' 3\") 103.8 kg (228 lb 13.4 oz)         Electronically Signed By: Jaquelin Mora MD  October 25, 2024  3:28 PM  "

## 2024-10-25 NOTE — OR NURSING
Upon prepping pt for a pre-op block, pt was in and out of trigeminy vs frequent PVCs. Pt is asymptomatic. Dr Mora notified and went to bedside to assess pt. Decision made to not do the TAP block in pre-op, get a 12-lead EKG, start second PIV if possible with a CMP lab draw. PIV attempt produced sufficient lab draw without a working PIV. EKG arrived to bedside for a 12-lead.

## 2024-10-25 NOTE — ANESTHESIA PROCEDURE NOTES
Airway       Patient location during procedure: OR       Procedure Start/Stop Times: 10/25/2024 8:09 AM  Staff -        Anesthesiologist:  Jaquelin Mora MD       CRNA: Cheryl Pizarro APRN CRNA       Performed By: CRNA  Consent for Airway        Urgency: elective  Indications and Patient Condition       Indications for airway management: jonnathan-procedural       Induction type:intravenous       Mask difficulty assessment: 1 - vent by mask    Final Airway Details       Final airway type: endotracheal airway       Successful airway: ETT - single  Endotracheal Airway Details        ETT size (mm): 7.0       Cuffed: yes       Cuff volume (mL): 6       Successful intubation technique: video laryngoscopy       VL Blade Size: Glidescope 3       Grade View of Cords: 1       Adjucts: stylet       Position: Right       Measured from: gums/teeth       Secured at (cm): 21       Bite block used: None    Post intubation assessment        Placement verified by: capnometry, equal breath sounds and chest rise        Number of attempts at approach: 1       Number of other approaches attempted: 0       Secured with: tape       Ease of procedure: easy       Dentition: Intact and Unchanged    Medication(s) Administered   Medication Administration Time: 10/25/2024 8:09 AM

## 2024-10-25 NOTE — ANESTHESIA PREPROCEDURE EVALUATION
Anesthesia Pre-Procedure Evaluation    Patient: Damaris Sarmiento   MRN: [id number] : 1975        Procedure : Procedure(s):  HYSTERECTOMY, TOTAL, ROBOT-ASSISTED, LAPAROSCOPIC, WITH BILATERAL SALPINGO-OOPHORECTOMY AND  CYSTOSCOPY          Past Medical History:   Diagnosis Date    Alternating constipation and diarrhea 2022    Asthma     Discoid lupus     Diverticulosis of large intestine 2021    FOUND SCREENING SCOPE, TRANSVERSE/DESCENDING    Dysmenorrhea 2022    GERD (gastroesophageal reflux disease) 10/04/2012    Menorrhagia with regular cycle 2019    Polycystic ovarian syndrome     PONV (postoperative nausea and vomiting)     POTS (postural orthostatic tachycardia syndrome) 2021    RW ALLERGIES/IMMUNOLOGY (ABSTRACTED)     Sinusitis     Subclavian vein thrombosis, right (H)     Thoracic outlet syndrome associated with cervical rib 2022      Past Surgical History:   Procedure Laterality Date    BIOPSY      Sinus surgery 2014; Flex Sigmoidoscopy     ENDOSCOPIC SINUS SURGERY  2014    Procedure: ENDOSCOPIC SINUS SURGERY;  Surgeon: Suzi Vieyra MD;  Location: [facility]    ENT SURGERY  2014    bilateral etmoodectomy&maxillary anstronomy,turbinate resect    EP STUDY TILT TABLE N/A 2021    Procedure: EP TILT TABLE;  Surgeon: Ilya Gilmore MD;  Location:  [facility]    EYE SURGERY  5815-2582    PRK LASIK, Left eye 2924, Right Eye 2015    ORTHOPEDIC SURGERY      SOFT TISSUE SURGERY  [id number]    Several venoplasty balloon-R subclavian vein    THORACIC SURGERY  2000    resection of R first rib-subclavian vein clot thoracic outle    TURBINECTOMY  2014    Procedure: TURBINECTOMY;  Surgeon: Suzi Vieyra MD;  Location: [facility]    ZZC NONSPECIFIC PROCEDURE  2000    resection of first rib    ZZC NONSPECIFIC PROCEDURE  1992    left foot surgery      Allergies   Allergen Reactions    Adhesive Tape Hives    Cat Hair  Extract Hives    Dogs Hives     wheezing    Dust Mites      Other reaction(s): Wheezing  eye mucous    Erythromycin     Gluten Meal      Other reaction(s): GI intolerance    Grass Hives     eye mucous    No Clinical Screening - See Comments Hives, Itching and Photosensitivity    Oxycodone      Other reaction(s): Nausea, cramps, dizzyness    Pollen Extract      Other reaction(s): Other (see comments)  Tree pollen-hives, eye mucous    Ragweeds Hives     eye mucous    Red Dye #40 (Allura Red) Hives    Trees      Other reaction(s): Other (see comments)  Sinus issues    Wheat Hives     Other reaction(s): GI intolerance      Social History     Tobacco Use    Smoking status: Never    Smokeless tobacco: Never   Substance Use Topics    Alcohol use: Not Currently     Comment: 0-1 cocktails per month      Wt Readings from Last 1 Encounters:   10/25/24 103.8 kg (228 lb 13.4 oz)        Anesthesia Evaluation            ROS/MED HX  ENT/Pulmonary:     (+)                     Intermittent, asthma                  Neurologic:  - neg neurologic ROS     Cardiovascular: Comment: Thoracic outlet syndrome  POTS      METS/Exercise Tolerance: >4 METS    Hematologic:     (+) History of blood clots (Right subclavian vein thrombosis),    pt is not anticoagulated,        (-) anemia   Musculoskeletal: Comment: fibromyalgia - neg musculoskeletal ROS     GI/Hepatic: Comment: Diaphragmatic hernia    (+) GERD, Asymptomatic on medication,               (-) liver disease   Renal/Genitourinary:    (-) renal disease   Endo:     (+)               Obesity (bmi 40),       Psychiatric/Substance Use:  - neg psychiatric ROS     Infectious Disease:  - neg infectious disease ROS     Malignancy:  - neg malignancy ROS     Other:  - neg other ROS          Physical Exam    Airway        Mallampati: II   TM distance: > 3 FB   Neck ROM: full   Mouth opening: > 3 cm    Respiratory Devices and Support         Dental  no notable dental history         Cardiovascular    cardiovascular exam normal       Rhythm and rate: regular and normal     Pulmonary   pulmonary exam normal        breath sounds clear to auscultation           OUTSIDE LABS:  CBC:   CBC RESULTS:   Recent Labs   Lab Test 09/20/23  1001 08/15/22  1027 09/03/21  1204 08/19/20  1141   WBC 9.6 8.9 9.8 9.6   HGB 11.8 11.6* 12.0 12.0   HCT 36.1 36.6 36.9 36.5    416 383 313     Last Comprehensive Metabolic Panel:  Recent Labs   Lab Test 10/25/24  0550 08/12/24  0842 09/20/23  1001 08/15/22  1027 09/03/21  1204   NA  --  141 141 138 139   POTASSIUM  --  4.4 4.5 3.5 4.0   CHLORIDE  --  103 104 105 105   CO2  --  23 25 26 26   ANIONGAP  --  15 12 7 8   BUN  --  9.0 9.1 7 8   CR  --  0.68 0.69 0.56 0.68   GFRESTIMATED  --  >90 >90 >90 >90   GLC 94 95 86 87 80   JONH  --  8.9 9.3 9.1 9.4        Lab Results   Component Value Date    WBC 9.6 09/20/2023    WBC 8.9 08/15/2022    HGB 11.8 09/20/2023    HGB 11.6 (L) 08/15/2022    HCT 36.1 09/20/2023    HCT 36.6 08/15/2022     09/20/2023     08/15/2022     BMP:   Lab Results   Component Value Date     08/12/2024     09/20/2023    POTASSIUM 4.4 08/12/2024    POTASSIUM 4.5 09/20/2023    CHLORIDE 103 08/12/2024    CHLORIDE 104 09/20/2023    CO2 23 08/12/2024    CO2 25 09/20/2023    BUN 9.0 08/12/2024    BUN 9.1 09/20/2023    CR 0.68 08/12/2024    CR 0.69 09/20/2023    GLC 94 10/25/2024    GLC 95 08/12/2024     COAGS:   Lab Results   Component Value Date    PTT 24 08/28/2015    INR 1.0 09/22/2015     POC:   Lab Results   Component Value Date    HCG Negative 07/16/2014     HEPATIC:   Lab Results   Component Value Date    ALBUMIN 3.9 08/12/2024    PROTTOTAL 6.9 08/12/2024    ALT 10 08/12/2024    AST 19 08/12/2024    ALKPHOS 71 08/12/2024    BILITOTAL 0.3 08/12/2024     OTHER:   Lab Results   Component Value Date    A1C 5.5 08/12/2024    JONH 8.9 08/12/2024    LIPASE 119 02/06/2018    TSH 1.85 09/20/2023    T4 2.66 (H) 09/20/2017    CRP 3.6 03/22/2022    SED 27  "(H) 03/22/2022       Anesthesia Plan    ASA Status:  3    NPO Status:  NPO Appropriate    Anesthesia Type: General.     - Airway: ETT         Techniques and Equipment:     - Airway: Video-Laryngoscope     - Lines/Monitors: 2nd IV     Consents    Anesthesia Plan(s) and associated risks, benefits, and realistic alternatives discussed. Questions answered and patient/representative(s) expressed understanding.     - Discussed:     - Discussed with:  Patient      - Extended Intubation/Ventilatory Support Discussed: No.      - Patient is DNR/DNI Status: No     Use of blood products discussed: Yes.     - Discussed with: Patient.     - Consented: consented to blood products     Postoperative Care    Pain management: IV analgesics, Oral pain medications, Multi-modal analgesia.   PONV prophylaxis: Ondansetron (or other 5HT-3), Aprepitant, Background Propofol Infusion, Dexamethasone or Solumedrol     Comments:               Jaquelin Mora MD    I have reviewed the pertinent notes and labs in the chart from the past 30 days and (re)examined the patient.  Any updates or changes from those notes are reflected in this note.             # Drug Induced Platelet Defect: home medication list includes an antiplatelet medication           # Severe Obesity: Estimated body mass index is 40.54 kg/m  as calculated from the following:    Height as of this encounter: 1.6 m (5' 3\").    Weight as of this encounter: 103.8 kg (228 lb 13.4 oz).             "

## 2024-10-25 NOTE — PROGRESS NOTES
Gynecologic Postoperative Check Note  10/25/2024    S: Patient reports she is doing well postoperatively. Pain is 3-4/10 at present, and she just received some Tylenol. She reports a little dizziness when first sitting up, which is normal for her due to POTS. No dizziness otherwise. She was able to ambulate to the bathroom and void. No chest pain or SOB. Tolerating Jello and soda without nausea or vomiting.    O:  Vitals:    10/25/24 1445 10/25/24 1500 10/25/24 1530 10/25/24 1600   BP: 135/77 128/73 138/81 (!) 149/83   Cuff Size:       Pulse: 82 81 83 90   Resp: 14 13 11 20   Temp:    97.9  F (36.6  C)   TempSrc:    Oral   SpO2: 95% 94% 95% 95%   Weight:       Height:         Gen: NAD  Cardio: RRR, no murmurs  Resp: CTAB, no wheezing or crackles  Abdomen: soft, appropriately tender, incision c/d/i    A&P: 49 year old POD#0 s/p RA-TLH, BSO, cystoscopy for dysmenorrhea. Perioperative course notable for new frequent PVCs, s/p IV magnesium perioperatively without significant improvement, although patient is asymptomatic. Patient otherwise doing well in the immediate postoperative period.    # Postoperative care  - Pain: Tylenol, Toradol, Tramadol PRN  - Hgb 11.1> EBL 75; no signs or symptoms of ongoing bleeding  - : S/p Hidalgo, voiding  - GI: Scheduled Miralax and Pepcid (home medications)    # Frequent PVCs  - Internal medicine consulted, appreciate recommendations  - BMP and magnesium pending; will replete PRN for K<4 and Mg<2  - 500 mL NS bolus pending    # MCAS  - Continue home budesonide, Claritin, Xyzal    # POTS  - Continue home Florinef, salt capsules    # Hx upper extremity DVT 2/2 thoracic outlet syndrome  - PTA aspirin currently held  - Not a candidate for estrogen replacement    Dispo: Observation overnight    Maida Steele MD  OB/GYN PGY-3  10/25/2024 4:58 PM

## 2024-10-25 NOTE — ANESTHESIA PROCEDURE NOTES
Arterial Line Procedure Note    Pre-Procedure   Staff -        Performed By: anesthesiologist       Location: OR       Pre-Anesthestic Checklist: patient identified, IV checked, risks and benefits discussed, informed consent, monitors and equipment checked, pre-op evaluation and at physician/surgeon's request  Timeout:       Correct Patient: Yes        Correct Procedure: Yes        Correct Site: Yes        Correct Position: Yes   Line Placement:   This line was placed Post Induction  Procedure   Procedure: arterial line       Laterality: left       Insertion Site: radial.  Sterile Prep        Standard elements of sterile barrier followed       Skin prep: Chloraprep  Insertion/Injection        Technique: Seldinger Technique        Catheter Type/Size: 20 G, 1.75 in/4.5 cm quick cath (integral wire)  Narrative         Secured by: suture       Tegaderm dressing used.       Complications: None apparent,        Arterial waveform: Yes        IBP within 10% of NIBP: Yes

## 2024-10-25 NOTE — PROGRESS NOTES
Dr. Reyes at [facility]. Requested that pt be admitted for observation and to contact the surgical team for confirmation.

## 2024-10-25 NOTE — ANESTHESIA PROCEDURE NOTES
TAP Procedure Note    Pre-Procedure   Staff -        Anesthesiologist:  Jett Cade DO       Resident/Fellow: Mick Le MD       Performed By: resident       Location: pre-op       Pre-Anesthestic Checklist: patient identified, IV checked, site marked, risks and benefits discussed, informed consent, monitors and equipment checked, pre-op evaluation, at physician/surgeon's request and post-op pain management  Timeout:       Correct Patient: Yes        Correct Procedure: Yes        Correct Site: Yes        Correct Position: Yes        Correct Laterality: Yes        Site Marked: Yes  Procedure Documentation  Procedure: TAP       Diagnosis: POST OPERATIVE PAIN       Laterality: bilateral       Patient Position: supine       Patient Prep/Sterile Barriers: sterile gloves, mask       Skin prep: Chloraprep       Needle Type: short bevel       Needle Gauge: 21.        Needle Length (millimeters): 110        Ultrasound guided       1. Ultrasound was used to identify targeted nerve, plexus, vascular marker, or fascial plane and place a needle adjacent to it in real-time.       2. Ultrasound was used to visualize the spread of anesthetic in close proximity to the above referenced structure.       3. A permanent image is entered into the patient's record.    Assessment/Narrative         The placement was negative for: blood aspirated, painful injection and site bleeding       Paresthesias: No.       Bolus given via needle..        Secured via.        Insertion/Infusion Method: Single Shot       Complications: none       Injection made incrementally with aspirations every 5 mL.    Medication(s) Administered   Bupivacaine 0.25% PF (Infiltration) - Infiltration   50 mL - 10/25/2024 8:20:00 AM  Dexamethasone 10 mg/mL PF (Perineural) - Perineural   2 mg - 10/25/2024 8:20:00 AM  Dexmedetomidine 4 mcg/mL (Perineural) - Perineural   40 mcg - 10/25/2024 8:20:00 AM    FOR [facility] ([facility]) ONLY:   Pain  "Team Contact information: please page the Pain Team Via [facility]. Search \"Pain\". During daytime hours, please page the attending first. At night please page the resident first.      "

## 2024-10-25 NOTE — OP NOTE
[facility]  Operative Note    Date of Service:  10/25/2024    Surgeon: Joslyn Dunn MD  Assistants: Cindy Steele MD, PGY-3    Preop Dx: Dysmenorrhea  Postop Dx: Same, s/p below procedure  Procedure: Exam under anesthesia, Robotic-assisted total laparoscopic hysterectomy, bilateral salpingo-oophorectomy, cystoscopy    Anesthesia: General anesthesia, TAP block  IVF: 1500 mL crystalloid  EBL: 75 mL  UOP: 900 mL clear urine at the end of the case    Specimens:  ID Type Source Tests Collected by Time Destination   1 : uterus, bilaterl fallopian tubes, cervix, and bilateral ovaries Tissue Uterus, Cervix, Bilateral Fallopian Tubes & Ovaries SURGICAL PATHOLOGY EXAM Joslyn Dunn MD 10/25/2024 10:14 AM      Complications: None apparent    Indications: Damaris Sarmiento is 49 year old female who presented with significant dysmenorrhea as well as exacerbation of her IBS and POTS symptoms during her menstrual periods. She was counseled on options and desired definitive management with hysterectomy. Risks, benefits and alternatives to above stated procedure were discussed. Patient desired to proceed, and written informed consent was obtained prior to proceeding.   Findings: No evidence of injury on entry. Normal appearing liver, stomach, diaphragms, colon. Right anterior fundal intramural uterine fibroid. Simple appearing left ovarian cyst, approximately 4 cm, and small ~1 cm left paratubal cyst. Otherwise normal appearing uterus, bilateral ovaries, bilateral fallopian tubes. Surgical sites hemostatic at end of case. Cystoscopy revealed brisk efflux from bilateral UOs and no evidence of bladder injury.  Procedure Details: The patient was transferred to the operating room where SCDs were placed. General anesthesia was obtained without noted difficulties, and she was positioned in lithotomy position with both arms tucked at sides. Exam under anesthesia was performed. The patient was then  prepped and draped in the usual sterile fashion. Surgical time out was performed. A speculum was placed in the vagina. The anterior lip of the cervix was grasped with a tenaculum. The cervix was serially dilated. The Advincula Arch Koh uterine manipulator was placed. The tenaculum and speculum were removed, and a Hidalgo catheter was placed.   Attention was then turned to the abdomen. An 11 blade scalpel was used to make a small vertical incision within the umbilicus and the Veress needle introduced through incision. The abdomen was insufflated with CO2 gas with an opening pressure of <5 mmHg.  Pneumoperitoneum was achieved to a maxiumum pressure of 15 mmHg. The Veress needle and penetrating towel clamps were removed. An 8 mm da Anne port was placed through the umbilical incision. Survey of the abdomen revealed the findings noted above. The patient was placed into steep Trendelenburg. Additional 8 mm da Anne ports were placed in the right lateral and left lateral mid abdomen under direct visualization. An 8 mm Airseal port was placed in the LUQ under direct visualization. The da Anne robot was docked and instruments were inserted, including the monopolar scissors and force bipolar.    Attention was then turned to the right IP ligament which was cauterized and transected, with care to avoid the ureter. The right round ligament was grasped, cauterized, and transected. The anterior and posterior leaves of the broad ligament were then opened and carefully dissected down to the level of the uterine arteries bilaterally. This dissection was continued medially to develop the bladder flap. The bladder was gently dissected off the uterus and cervix until it was clear of the planned area of colpotomy. Attention was then turned to the left side where the IP ligament, round ligament, and broad ligament were similarly divided. The bilateral uterine arteries were skeletonized, cauterized with the force bipolar, and transected.  Blanching of the uterus was noted. The monopolar scissors were then used to further dissect the cardial ligament so that the vascular pedicles fell away from the cervix bilaterally. The monopolar scissors were then used to make the colpotomy, which was continued circumfrentially until the uterus was free of all attachments. The specimen was then removed through the vagina. A vaginal balloon was inserted to mantain adequate insufflation. The vaginal cuff was closed with 0 VLoc suture via robotic technique. The pelvis was irrigated. Hemostasis of all surgical excision sites were noted. All instruments were then removed from the abdomen, and the da Anne robot was undocked.   Next attention was turned to cystoscopy. The Hidalgo catheter was removed. A 30-degree angled cystoscope was placed with findings as above. Cystoscope was used to drain the bladder prior to removal. The Hidalgo catheter was not replaced. Vaginal balloon was removed from the vagina and vaginal exam revealed an intact cuff.  All laparoscopic instruments and ports were then removed, and the pneumoperitoneum was expelled. The incisions were closed with 4-0 Monocryl and covered with Dermabond.  Sponge, instrument, and needle counts were correct. The patient tolerated the procedure well and was taken to PACU in stable condition. Dr. Dunn was present for the entire procedure.    Maida Steele MD  OB/GYN PGY-3  10/25/2024 4:13 PM    Staff:  I was scrubbed and present for entire case and agree w/ above note.    Joslyn Dunn MD

## 2024-10-25 NOTE — PROGRESS NOTES
PACU to Inpatient Nursing Handoff    Patient Damaris Sarmiento is a 49 year old female who speaks English.   Procedure Procedure(s):  HYSTERECTOMY, TOTAL, ROBOT-ASSISTED, LAPAROSCOPIC, WITH BILATERAL SALPINGO-OOPHORECTOMY AND  CYSTOSCOPY   Surgeon(s) Primary: Joslyn Dunn MD  Resident - Assisting: Maida Steele MD     Allergies   Allergen Reactions    Adhesive Tape Hives    Cat Hair Extract Hives    Dogs Hives     wheezing    Dust Mites      Other reaction(s): Wheezing  eye mucous    Erythromycin     Gluten Meal      Other reaction(s): GI intolerance    Grass Hives     eye mucous    No Clinical Screening - See Comments Hives, Itching and Photosensitivity    Oxycodone      Other reaction(s): Nausea, cramps, dizzyness    Pollen Extract      Other reaction(s): Other (see comments)  Tree pollen-hives, eye mucous    Ragweeds Hives     eye mucous    Red Dye #40 (Allura Red) Hives    Trees      Other reaction(s): Other (see comments)  Sinus issues    Wheat Hives     Other reaction(s): GI intolerance       Isolation  No active isolations     Past Medical History   has a past medical history of Alternating constipation and diarrhea (08/20/2022), Asthma, Discoid lupus, Diverticulosis of large intestine (06/2021), Dysmenorrhea (08/20/2022), GERD (gastroesophageal reflux disease) (10/04/2012), Menorrhagia with regular cycle (07/26/2019), Polycystic ovarian syndrome, PONV (postoperative nausea and vomiting), POTS (postural orthostatic tachycardia syndrome) (08/2021), RW ALLERGIES/IMMUNOLOGY (ABSTRACTED), Sinusitis, Subclavian vein thrombosis, right (H) (2000), and Thoracic outlet syndrome associated with cervical rib (08/20/2022).    Anesthesia General with Block   Dermatome Level     Preop Meds acetaminophen (Tylenol) - time given: 07:42  Emend - time given: 07:43   Nerve block Transversus abdominus plane (TAP).  Location:bilateral. Med:bupivacaine. Time given: 08:20   Intraop Meds dexamethasone  (Decadron)  dexmedetomidine (Precedex): 4 mcg total  fentanyl (Sublimaze): 200 mcg total  hydromorphone (Dilaudid): 0.5 mg total  ketorolac (Toradol): last given at 15  ondansetron (Zofran): last given at 10:57   Local Meds No   Antibiotics cefazolin (Ancef) - last given at 08:15  metronidazol (Flagyl) - last given at 08:15     Pain Patient Currently in Pain: denies   PACU meds  hydromorphone (Dilaudid):   mg (total dose) last given at 12:41   ondansetron (Zofran): 4 mg (total dose) last given at 12:50   Valium 1.25 mg @ 12:00   PCA / epidural No   Capnography     Telemetry ECG Rhythm: Sinus rhythm   Inpatient Telemetry Monitor Ordered? Yes        Labs Glucose Lab Results   Component Value Date    GLC 95 10/25/2024    GLC 94 10/25/2024    GLC 87 08/15/2022    GLC 76 08/19/2020       Hgb Lab Results   Component Value Date    HGB 11.1 10/25/2024    HGB 12.0 08/19/2020       INR Lab Results   Component Value Date    INR 1.0 09/22/2015      PACU Imaging Not applicable     Wound/Incision Incision/Surgical Site Umbilicus (Active)   Incision Assessment [facility] 10/25/24 1300   Dressing Intervention Clean, dry, intact;Open to air / No Dressing 10/25/24 1300   Number of days:        Incision/Surgical Site Lower;Right Flank (Active)   Incision Assessment [facility] 10/25/24 1300   Dressing Intervention Clean, dry, intact;Open to air / No Dressing 10/25/24 1300   Number of days:        Incision/Surgical Site Left;Upper Abdomen (Active)   Incision Assessment [facility] 10/25/24 1300   Dressing Intervention Clean, dry, intact;Open to air / No Dressing 10/25/24 1300   Number of days:        Incision/Surgical Site Left;Lower Abdomen (Active)   Incision Assessment [facility] 10/25/24 1300   Dressing Intervention Clean, dry, intact;Open to air / No Dressing 10/25/24 1300   Number of days:       CMS        Equipment Not applicable   Other LDA       IV Access Peripheral IV Left;Dorsal Hand (Active)   Site Assessment [facility] 10/25/24 1300   Line Status Infusing  10/25/24 1300   Dressing Transparent 10/25/24 1300   Dressing Status clean;intact;dry 10/25/24 1300   Phlebitis Scale 0-->no symptoms 10/25/24 1300   Infiltration? no 10/25/24 1300   Number of days:       Blood Products Not applicable EBL 25   mL   Intake/Output Date 10/25/24 0700 - 10/26/24 0659   Shift 6692-9500 2436-9861 1989-7806 24 Hour Total   INTAKE   P.O.  150  150   I.V. 1700   1700   Shift Total(mL/kg) 1700(16.38) 150(1.45)  1850(17.82)   OUTPUT   Urine  225  225   Shift Total(mL/kg)  225(2.17)  225(2.17)   Weight (kg) 103.8 103.8 103.8 103.8      Drains / Hidalgo     Time of void PreOp Time of Void Prior to Procedure: 0606 (10/25/24 0621)    PostOp Voided (mL): 225 mL (10/25/24 1600)    Diapered? No   Bladder Scan Bladder Scan Volume (mL): 213 ml (10/25/24 1529)    mL (10/25/24 1600)  tolerating sips and ice chips     Vitals    B/P: (!) 149/83  T: 97.9  F (36.6  C)    Temp src: Oral  P:  Pulse: 90 (10/25/24 1600)          R: 20  O2:  SpO2: 95 %    O2 Device: None (Room air) (10/25/24 1600)    Oxygen Delivery: 1 LPM (10/25/24 1530)         Family/support present significant other   Patient belongings     Patient transported on cart   DC meds/scripts (obs/outpt) Not applicable   Inpatient Pain Meds Released? No       Special needs/considerations RUE ROM limited due to Thoracic outlet syndrome - no access to RUE either. POTS; recent awareness of frequent PVC's (bigeminy and trigeminy)   Tasks needing completion None       Sonia Olivera, RN  MARYELLEN Cordon

## 2024-10-25 NOTE — PROGRESS NOTES
Damaris Sarmiento Admission Note    Reason for admission: Hysterectomy   Primary team notified of pt arrival.  Admitted from: PACU  Via: [facility]  Accompanied by: PACU staff  Belongings: Placed in closet; valuables sent home with family  Admission Required Doc Completed: Yes  Teaching: Orientation to unit and call light- call light within reach, use of console, meal times, when to call for the RN, and enforced importance of safety.  IV Access: Left PIV running 500mL bolus  Telemetry: Yes  Ht./Wt.: Completed  Code Status verified on armband: Yes  2 RN Skin Assessment Completed with: Idalmis Donahue RN  Suction/Ambu bag/Flowmeter at bedside: Yes    Pt status: Pt stable, she is being monitored overnight for PVC's, but as long as they don't get closer together, she should discharge tomorrow.      Temp:  [97.5  F (36.4  C)-98.3  F (36.8  C)] 98.3  F (36.8  C)  Pulse:  [79-90] 87  Resp:  [11-20] 15  BP: ()/(62-83) 144/71  MAP:  [53 mmHg-89 mmHg] 75 mmHg  Arterial Line BP: (107-145)/(54-64) 107/56  SpO2:  [93 %-99 %] 98 %Room Air

## 2024-10-25 NOTE — DISCHARGE SUMMARY
Gynecology Discharge Summary    Damaris Sarmiento    : 1975  MRN: [id number]            Date of Admission:  10/25/2024  Date of Discharge:  10/26/2024  Admitting Physician:  Joslyn Dunn MD  Discharge Physician:  Regla Harrison MD  Discharging Service:  Gynecology     Primary Provider: Alan Tobar          Admission Diagnoses:   Dysmenorrhea  POTS  MCAS  IBS  Hx upper extremity DVT  Frequent PVCs          Discharge Diagnosis:   Same, s/p below procedure             Procedures:   Robotic-assisted TLH, BSO, cystoscopy          Medications Prior to Admission:     Facility-Administered Medications Prior to Admission   Medication Dose Route Frequency Provider Last Rate Last Admin    [DISCONTINUED] metroNIDAZOLE (FLAGYL) infusion 500 mg  500 mg Intravenous Once Joslyn Dunn MD         Medications Prior to Admission   Medication Sig Dispense Refill Last Dose/Taking    acetaminophen (TYLENOL) 650 MG CR tablet Take 1,300 mg by mouth 2 times daily.   10/25/2024 at  3:30 AM    BABY ASPIRIN PO Take 81 mg by mouth daily.   Past Month    budesonide (ENTOCORT EC) 3 MG EC capsule    More than a month    budesonide (RINOCORT AQUA) 32 MCG/ACT nasal spray Spray 1 spray into both nostrils daily   10/24/2024 at 10:00 AM    CLARITIN 10 MG OR TABS Take 10 mg by mouth 2 times daily.   10/25/2024 at  3:30 AM    clobetasol (TEMOVATE) 0.05 % external ointment APPLY TOPICALLY TO THE AFFECTED AREA TWICE DAILY FOR 14 DAYS AS NEEDED FOR FLARE   Past Month    famotidine (PEPCID) 20 MG tablet Take 20 mg by mouth daily.   10/25/2024 at  3:30 AM    fludrocortisone (FLORINEF) 0.1 MG tablet Take 0.1 mg by mouth daily.   10/25/2024 at  3:30 AM    fluocinonide (LIDEX) 0.05 % external ointment Apply topically as needed.   Past Week    ketorolac tromethamine (ACULAR-LS) 0.4 % SOLN ophthalmic solution Place 1 drop into both eyes daily.   10/25/2024 at  3:30 AM    ketotifen (ZADITOR/REFRESH ANTI-ITCH) 0.025 % SOLN ophthalmic  solution Place 1 drop into both eyes daily.   Past Week    LANsoprazole (PREVACID) 15 MG DR capsule Take 15 mg by mouth daily.   10/25/2024 at  3:30 AM    levocetirizine (XYZAL) 5 MG tablet Take 5 mg by mouth 2 times daily.   10/25/2024 at  3:30 AM    Lidocaine (LIDOCARE) 4 % Patch Place 1 patch onto the skin every evening.   10/24/2024 at 10:00 PM    mupirocin (BACTROBAN) 2 % ointment Apply topically as needed.  0 More than a month    polyethylene glycol (MIRALAX) 17 GM/Dose powder Take 1 capful by mouth daily   10/24/2024 at 12:00 PM    pseudoePHEDrine (SUDAFED) 120 MG 12 hr tablet Take 120 mg by mouth every 24 hours.   10/24/2024 at 10:00 AM    SODIUM CHLORIDE-SODIUM BICARB NA Spray in nostril. [place] Pot   10/25/2024 at  3:30 AM    tacrolimus (PROTOPIC) 0.1 % external ointment Apply topically as needed.   Past Week    triamcinolone (KENALOG) 0.1 % external cream Apply topically as needed.   Past Week    triamcinolone (NASACORT ALLERGY 24HR) 55 MCG/ACT nasal aerosol Spray 1 Units in nostril every 24 hours   10/24/2024 at 10:00 AM    UNABLE TO FIND Take by mouth 2 times daily. MEDICATION NAME: Salt Stick Vitassium   10/24/2024 at 10:00 AM    vitamin B-12 (CYANOCOBALAMIN) 1000 MCG tablet Take 1,000 mcg by mouth every other day.   10/24/2024 at 10:00 AM    vitamin D3 (CHOLECALCIFEROL) 2000 units (50 mcg) tablet Take 1 tablet by mouth daily.   10/24/2024 at 10:00 PM    XOLAIR 150 MG/ML SOSY injection Inject 150 mg subcutaneously every 28 days.   10/6/2024    EPINEPHrine (ANY BX GENERIC EQUIV) 0.3 MG/0.3ML injection 2-pack INJECT 1 PEN IN THE MUSCLE ONE TIME AS DIRECTED   Unknown             Discharge Medications:     Current Discharge Medication List        START taking these medications    Details   traMADol (ULTRAM) 50 MG tablet Take 1 tablet (50 mg) by mouth every 6 hours as needed for severe pain.  Qty: 6 tablet, Refills: 0    Associated Diagnoses: Postoperative pain           CONTINUE these medications which  have NOT CHANGED    Details   acetaminophen (TYLENOL) 650 MG CR tablet Take 1,300 mg by mouth 2 times daily.      BABY ASPIRIN PO Take 81 mg by mouth daily.      budesonide (ENTOCORT EC) 3 MG EC capsule       budesonide (RINOCORT AQUA) 32 MCG/ACT nasal spray Spray 1 spray into both nostrils daily      CLARITIN 10 MG OR TABS Take 10 mg by mouth 2 times daily.    Associated Diagnoses: Allergic rhinitis, cause unspecified      clobetasol (TEMOVATE) 0.05 % external ointment APPLY TOPICALLY TO THE AFFECTED AREA TWICE DAILY FOR 14 DAYS AS NEEDED FOR FLARE      famotidine (PEPCID) 20 MG tablet Take 20 mg by mouth daily.      fludrocortisone (FLORINEF) 0.1 MG tablet Take 0.1 mg by mouth daily.      fluocinonide (LIDEX) 0.05 % external ointment Apply topically as needed.      ketorolac tromethamine (ACULAR-LS) 0.4 % SOLN ophthalmic solution Place 1 drop into both eyes daily.      ketotifen (ZADITOR/REFRESH ANTI-ITCH) 0.025 % SOLN ophthalmic solution Place 1 drop into both eyes daily.      LANsoprazole (PREVACID) 15 MG DR capsule Take 15 mg by mouth daily.      levocetirizine (XYZAL) 5 MG tablet Take 5 mg by mouth 2 times daily.      Lidocaine (LIDOCARE) 4 % Patch Place 1 patch onto the skin every evening.      mupirocin (BACTROBAN) 2 % ointment Apply topically as needed.  Refills: 0      polyethylene glycol (MIRALAX) 17 GM/Dose powder Take 1 capful by mouth daily      pseudoePHEDrine (SUDAFED) 120 MG 12 hr tablet Take 120 mg by mouth every 24 hours.      SODIUM CHLORIDE-SODIUM BICARB NA Spray in nostril. [place] Pot      tacrolimus (PROTOPIC) 0.1 % external ointment Apply topically as needed.      triamcinolone (KENALOG) 0.1 % external cream Apply topically as needed.      triamcinolone (NASACORT ALLERGY 24HR) 55 MCG/ACT nasal aerosol Spray 1 Units in nostril every 24 hours      UNABLE TO FIND Take by mouth 2 times daily. MEDICATION NAME: Salt Stick Vitassium      vitamin B-12 (CYANOCOBALAMIN) 1000 MCG tablet Take 1,000 mcg  by mouth every other day.      vitamin D3 (CHOLECALCIFEROL) 2000 units (50 mcg) tablet Take 1 tablet by mouth daily.      XOLAIR 150 MG/ML SOSY injection Inject 150 mg subcutaneously every 28 days.      EPINEPHrine (ANY BX GENERIC EQUIV) 0.3 MG/0.3ML injection 2-pack INJECT 1 PEN IN THE MUSCLE ONE TIME AS DIRECTED                   Consultations:   Internal medicine            Brief History of Illness:   Damaris Sarmiento is 49 year old female who presented with significant dysmenorrhea as well as exacerbation of her IBS and POTS symptoms during her menstrual periods. She was counseled on options and desired definitive management with hysterectomy. Risks, benefits and alternatives to above stated procedure were discussed. Patient desired to proceed, and written informed consent was obtained prior to proceeding.           Hospital Course:     Her intraoperative course was uncomplicated. EBL 75 mL. Please see operative note for details.     Patient was noted to have trigeminy in preop, which was new. She received IV magnesium preoperatively without transient improvement, but frequent PVCs recurrent postoperatively. She was admitted for observation. Internal medicine was consulted and recommended IV hydration and repeat labs to assess serum potassium and magnesium. An echocardiogram was ordered but not able to be completed over the weekend. Cardiology was curbsided and agreed with plan for discharge with close outpatient follow up. She will have an echocardiogram and Zio patch completed outpatient. Referral to cardiology was placed.    Her postoperative course was otherwise uncomplicated. She was deemed appropriate for discharge on POD#1. She was afebrile, her pain was well controlled on PO meds, she was tolerating regular diet with no nausea or vomiting, ambulating and voiding without difficulty, passing flatus and had a BM. Her vitals were within normal limits. Please see progress note on the day of discharge for  further details of exam and findings.            Discharge Instructions and Follow-Up:     Discharge diet: Regular   Discharge activity: No heavy lifting for 6 week(s), nothing in the vagina for 6 weeks   Discharge follow-up: Follow up with primary care within the next week for echocardiogram and Ziopatch  Follow up with cardiology  Follow up with Dr. Dunn for postoperative visit     Maida Steele MD  OB/GYN PGY-3  10/26/2024 4:41 PM

## 2024-10-26 VITALS
DIASTOLIC BLOOD PRESSURE: 61 MMHG | OXYGEN SATURATION: 100 % | HEIGHT: 63 IN | TEMPERATURE: 98.2 F | RESPIRATION RATE: 19 BRPM | HEART RATE: 94 BPM | SYSTOLIC BLOOD PRESSURE: 143 MMHG | BODY MASS INDEX: 40.55 KG/M2 | WEIGHT: 228.84 LBS

## 2024-10-26 LAB
ANION GAP SERPL CALCULATED.3IONS-SCNC: 13 MMOL/L (ref 7–15)
BUN SERPL-MCNC: 11 MG/DL (ref 6–20)
CALCIUM SERPL-MCNC: 8.9 MG/DL (ref 8.8–10.4)
CHLORIDE SERPL-SCNC: 101 MMOL/L (ref 98–107)
CREAT SERPL-MCNC: 0.72 MG/DL (ref 0.51–0.95)
EGFRCR SERPLBLD CKD-EPI 2021: >90 ML/MIN/1.73M2
GLUCOSE BLDC GLUCOMTR-MCNC: 132 MG/DL (ref 70–99)
GLUCOSE SERPL-MCNC: 190 MG/DL (ref 70–99)
HCO3 SERPL-SCNC: 23 MMOL/L (ref 22–29)
HOLD SPECIMEN: NORMAL
MAGNESIUM SERPL-MCNC: 2.3 MG/DL (ref 1.7–2.3)
PHOSPHATE SERPL-MCNC: 3.8 MG/DL (ref 2.5–4.5)
POTASSIUM SERPL-SCNC: 4 MMOL/L (ref 3.4–5.3)
SODIUM SERPL-SCNC: 137 MMOL/L (ref 135–145)

## 2024-10-26 PROCEDURE — 36415 COLL VENOUS BLD VENIPUNCTURE: CPT | Performed by: STUDENT IN AN ORGANIZED HEALTH CARE EDUCATION/TRAINING PROGRAM

## 2024-10-26 PROCEDURE — 99215 OFFICE O/P EST HI 40 MIN: CPT | Performed by: INTERNAL MEDICINE

## 2024-10-26 PROCEDURE — 84100 ASSAY OF PHOSPHORUS: CPT | Performed by: STUDENT IN AN ORGANIZED HEALTH CARE EDUCATION/TRAINING PROGRAM

## 2024-10-26 PROCEDURE — 250N000013 HC RX MED GY IP 250 OP 250 PS 637

## 2024-10-26 PROCEDURE — 250N000011 HC RX IP 250 OP 636

## 2024-10-26 PROCEDURE — 80048 BASIC METABOLIC PNL TOTAL CA: CPT | Performed by: STUDENT IN AN ORGANIZED HEALTH CARE EDUCATION/TRAINING PROGRAM

## 2024-10-26 PROCEDURE — 93005 ELECTROCARDIOGRAM TRACING: CPT

## 2024-10-26 PROCEDURE — 82962 GLUCOSE BLOOD TEST: CPT

## 2024-10-26 PROCEDURE — 83735 ASSAY OF MAGNESIUM: CPT | Performed by: STUDENT IN AN ORGANIZED HEALTH CARE EDUCATION/TRAINING PROGRAM

## 2024-10-26 RX ADMIN — LORATADINE 10 MG: 10 TABLET ORAL at 08:38

## 2024-10-26 RX ADMIN — LEVOCETIRIZINE DIHYDROCHLORIDE 5 MG: 5 TABLET ORAL at 08:38

## 2024-10-26 RX ADMIN — ACETAMINOPHEN 975 MG: 325 TABLET, FILM COATED ORAL at 01:06

## 2024-10-26 RX ADMIN — KETOROLAC TROMETHAMINE 15 MG: 15 INJECTION, SOLUTION INTRAMUSCULAR; INTRAVENOUS at 05:15

## 2024-10-26 RX ADMIN — POLYETHYLENE GLYCOL 3350 17 G: 17 POWDER, FOR SOLUTION ORAL at 06:27

## 2024-10-26 RX ADMIN — FLUDROCORTISONE ACETATE 0.1 MG: 0.1 TABLET ORAL at 08:38

## 2024-10-26 RX ADMIN — ACETAMINOPHEN 975 MG: 325 TABLET, FILM COATED ORAL at 11:05

## 2024-10-26 RX ADMIN — KETOROLAC TROMETHAMINE 15 MG: 15 INJECTION, SOLUTION INTRAMUSCULAR; INTRAVENOUS at 16:33

## 2024-10-26 RX ADMIN — FAMOTIDINE 20 MG: 20 TABLET ORAL at 08:38

## 2024-10-26 RX ADMIN — FLUTICASONE PROPIONATE 2 SPRAY: 50 SPRAY, METERED NASAL at 08:40

## 2024-10-26 RX ADMIN — KETOROLAC TROMETHAMINE 15 MG: 15 INJECTION, SOLUTION INTRAMUSCULAR; INTRAVENOUS at 11:06

## 2024-10-26 RX ADMIN — ACETAMINOPHEN 975 MG: 325 TABLET, FILM COATED ORAL at 05:15

## 2024-10-26 ASSESSMENT — ACTIVITIES OF DAILY LIVING (ADL)
ADLS_ACUITY_SCORE: 0

## 2024-10-26 NOTE — PROGRESS NOTES
Received alert and oriented x4, RA  On telemetry - Vent. Trigeminy   Complained of minimal pain 2-3/10, Given Tylenol, Atarax and Toradol  Denies N/V, N/T, SOB and chest discomfort  Surgical incisions - TOMMY, no drainage.  Due meds given  Ambulated to bathroom, voided without difficulty  Significant other/ stayed overnight.  Instructed to use call light for assistance.

## 2024-10-26 NOTE — PLAN OF CARE
Goal Outcome Evaluation:      Plan of Care Reviewed With: patient    Overall Patient Progress: improvingOverall Patient Progress: improving     Pt. discharged at 1720 to home, and left with personal belongings. Pt. received complete discharge paperwork. Pt to  Toradol prescription at pharmacy in [place], [place]. Pt. was given times of last dose for all discharge medications in writing on discharge medication sheets. Discharge teaching included Toradol medication, pain management, activity restrictions, and signs and symptoms of infection. Pt. to follow up with [facility] in December. Pt. had no further questions at the time of discharge and no unmet needs were identified.       Pt clam and cooperative throughout shift  PVCs present on tele monitor, MD aware  Rates pain 1/10, managing pain with Toradol  Reports having BM today, 10/26  Lap sites TOMMY and RASHELL

## 2024-10-26 NOTE — PROGRESS NOTES
[facility]    Medicine Progress Note - Hospitalist Service, GOLD TEAM 19    Date of Admission:  10/25/2024    Assessment & Plan   Damaris Sarmiento is a 49 year old female admitted on 10/25/2024. She  has a history of deep vein thrombosis (DVT) secondary to thoracic outlet syndrome and postural orthostatic tachycardia syndrome (POTS) managed with Florinef. She was admitted following a hysterectomy and had preoperative premature ventricular contractions, for which medicine was consulted.     #Frequent PVCs  #Hx of POTS  Found incidentally on pre-op EKG 10/25 frequent PVCs, low voltage QRS complexes, otherwise normal sinus rhythm with no ischemic changes.  Electrolytes, magnesium normal.  Hemodynamically stable and completely asymptomatic.  Very physically active at baseline preoperatively doing Pilates with excellent exercise capacity.   H/O POTS, on chronic Florinef and no recent lightheadedness or syncopal episodes.  No prior echocardiograms in chart.    Unfortunately, we were unable to get an echocardiogram on 10/26 due to lack of echo tech staffing.  Patient remains completely asymptomatic and hemodynamically stable.  Ambulating the halls without difficulty.  No palpitations.  I reviewed the patient's case, EKGs with the on-call cardiology fellow.  Preferably would have an echocardiogram to rule out structural heart disease, but given absence of symptoms, excellent baseline exercise capacity and normal exam, would be reasonable to allow her to discharge home with outpatient follow-up.  The patient and her  would prefer not to stay in the hospital for this over the weekend and are agreeable with pursuing workup as an outpatient.  -As above, recommend outpatient follow-up with PCP this coming week to arrange echocardiogram to rule out structural heart disease and Zio patch to determine PVC burden  -Would consider metoprolol to suppress PVCs, however given history of  "POTS treated with chronic Florinef we will hold off at this time.  -Will place cardiology  referral in discharge orders  -Continue PTA Florinef     # History of upper extremity deep vein thrombosis secondary to thoracic outlet syndrome  PTA on aspirin.  -Resume PTA ASA when okay with surgical team     # Status post hysterectomy  Doing remarkably well postop day #1.  Ambulating regularly in the halls with her , pain well-controlled, eating well and has already had an excellent bowel movement.  Abdominal exam benign.  -Management per surgical team            Diet: Resume pre-procedure diet  Advance Diet as Tolerated: Clear Liquid Diet    DVT Prophylaxis: Ambulate every shift  Hidalgo Catheter: Not present  Lines: None     Cardiac Monitoring: ACTIVE order. Indication: Syncope- low cardiac risk (24 hours)  Code Status: Full Code      Clinically Significant Risk Factors Present on Admission           # Hypocalcemia: Lowest Ca = 8.5 mg/dL in last 2 days, will monitor and replace as appropriate       # Drug Induced Platelet Defect: home medication list includes an antiplatelet medication           # Severe Obesity: Estimated body mass index is 40.54 kg/m  as calculated from the following:    Height as of this encounter: 1.6 m (5' 3\").    Weight as of this encounter: 103.8 kg (228 lb 13.4 oz).              Disposition Plan     Medically Ready for Discharge: Ready Now             Miguel Noland MD  Hospitalist Service, [facility] 19  [facility]  Securely message with Keko (more info)  Text page via [facility] Paging/Directory   See signed in provider for up to date coverage information  ______________________________________________________________________    Interval History   Seen accompanied by her .  Doing great.  Hoping to go home.  Has been ambulating in the halls regularly, pain well-controlled.  Eating well, had a good bowel movement.  Denies " any lightheadedness with ambulation.  No chest pain, pressure or palpitations.  No nausea.  No dyspnea or cough.  At baseline exercises regularly doing Pilates without dyspnea, anginal symptoms or lightheadedness.  Has not had any lightheadedness or syncopal episodes for quite some time related to her POTS which has been under excellent control.    Physical Exam   Vital Signs: Temp: 98.6  F (37  C) Temp src: Oral BP: (!) 146/71 Pulse: 95   Resp: 16 SpO2: 98 % O2 Device: None (Room air) Oxygen Delivery: 1 LPM  Weight: 228 lbs 13.4 oz  General: Alert and oriented x 4, very pleasant.  Speech normal.  Affect appropriate.  HEENT: OP moist and clear.  Chest: CTA bilaterally  CV: RRR.  Frequent premature beats.  No murmurs.  No rubs.   Abdomen: Normal active bowel sounds.  Soft, nondistended.  Minimal post hysterectomy tenderness.  No rebound or guarding.  Extremities: Warm.  No edema.  Neuro: CN II through XII grossly intact.  Strength 5/5 symmetrically.  Gait normal.      55 MINUTES SPENT BY ME on the date of service doing chart review, history, exam, documentation & further activities per the note.      Data   Imaging results reviewed over the past 24 hrs:   No results found for this or any previous visit (from the past 24 hours).  Most Recent 3 CBC's:  Recent Labs   Lab Test 10/25/24  0623 09/20/23  1001 08/15/22  1027   WBC 12.2* 9.6 8.9   HGB 11.1* 11.8 11.6*   MCV 83 86 87    435 416     Most Recent 3 BMP's:  Recent Labs   Lab Test 10/26/24  0707 10/26/24  0604 10/25/24  2109 10/25/24  0710     --  139 139   POTASSIUM 4.0  --  4.3 3.8   CHLORIDE 101  --  104 103   CO2 23  --  21* 24   BUN 11.0  --  8.5 8.9   CR 0.72  --  0.61 0.71   ANIONGAP 13  --  14 12   JONH 8.9  --  8.5* 9.0   * 132* 195* 95     Most Recent 2 LFT's:  Recent Labs   Lab Test 10/25/24  0710 08/12/24  0842   AST 13 19   ALT 11 10   ALKPHOS 66 71   BILITOTAL 0.4 0.3     Most Recent TSH and T4:  Recent Labs   Lab Test  09/20/23  1001 08/19/20  1141 09/20/17  1111   TSH 1.85   < > 1.16   T4  --   --  2.66*    < > = values in this interval not displayed.     Most Recent Hemoglobin A1c:  Recent Labs   Lab Test 08/12/24  0842   A1C 5.5

## 2024-10-26 NOTE — PLAN OF CARE
"Goal Outcome Evaluation:       49 Year old female patient admitted for a hysterectomy 10/25 she is post-op day 1. Vitals are stable with a slightly soft BP provider notified. On room air, alert x4. Last BM was on 10/25, urine output is adequate. Regular diet, independent in room. Four abdominal incisions open to air. Full code. History of: DVT and thoracic outlet syndrome. Patient is unable to bring right arm above shoulder level or it cause circulation through one of the major vessels to be occluded also no BP on the right arm. Patient is experiencing PVC's which are presenting as asymptomatic \"EKG preformed during shift\". Plan is to monitor patient.                  "

## 2024-10-26 NOTE — PROGRESS NOTES
Gynecologic   Inpatient progress note   10/26/2024    S: Patient reports she is doing well postoperatively. Pain is well controlled. She reports a little dizziness when first sitting up, which is normal for her due to POTS. No dizziness otherwise. She was able to ambulate to the bathroom and void. No chest pain or SOB. Tolerating PO without nausea or vomiting.    O:  Vitals:    10/25/24 1600 10/25/24 1753 10/25/24 2004 10/25/24 2344   BP: (!) 149/83 (!) 144/71 (!) 146/78 (!) 150/69   BP Location:  Left arm Left arm Left arm   Patient Position:   Semi-Kline's    Cuff Size:   Adult Regular    Pulse: 90 87 89    Resp: 20 15 18 16   Temp: 97.9  F (36.6  C) 98.3  F (36.8  C) 98.8  F (37.1  C) 98.6  F (37  C)   TempSrc: Oral Oral Oral Oral   SpO2: 95% 98% 96%    Weight:       Height:         Gen: NAD  Cardio: RRR, no murmurs  Resp: CTAB, no wheezing or crackles  Abdomen: soft, appropriately tender, incision c/d/i    A&P: 49 year old POD#1 s/p RA-TLH, BSO, cystoscopy for dysmenorrhea. Perioperative course notable for new frequent PVCs, s/p IV magnesium perioperatively without significant improvement, although patient is asymptomatic. S/p medicine consult, echocardiogram pending for trigeminy. Today, appropriate for discharge home pending Medicine evaluation.     # Postoperative care  - Pain: Tylenol, Toradol, Tramadol PRN  - Hgb 11.1> EBL 75; no signs or symptoms of ongoing bleeding  - : S/p Hidalgo, voiding  - GI: Scheduled Miralax and Pepcid (home medications)    # Frequent PVCs  - Internal medicine consulted, appreciate recommendations   - frequent PVCs remain, f/up cardiology recommendations  - f/up echocardiogram today  - If PVCs persist and become symptomatic, could initiate a low-dose beta blocker or calcium channel blocker; caution iso POTS and low BP.   - BMP and magnesium pending; will replete PRN for K<4 and Mg<2  - s/p 500 mL NS bolus   - AM EKG pending  - AM BMP, Mg, Phos pending     # MCAS  - Continue home  budesonide, Claritin, Xyzal    # POTS  - Continue home Florinef, salt capsules    # Hx upper extremity DVT 2/2 thoracic outlet syndrome  - PTA aspirin currently held   - Not a candidate for estrogen replacement    Medically Ready for Discharge: Anticipated Today    Kelsey Vail MD  Obstetrics & Gynecology, PGY-3  10/26/2024 10:25 AM

## 2024-10-28 ENCOUNTER — MYC MEDICAL ADVICE (OUTPATIENT)
Dept: FAMILY MEDICINE | Facility: CLINIC | Age: 49
End: 2024-10-28
Payer: COMMERCIAL

## 2024-10-28 DIAGNOSIS — I49.3 PVC'S (PREMATURE VENTRICULAR CONTRACTIONS): Primary | ICD-10-CM

## 2024-10-28 LAB
ATRIAL RATE - MUSE: 82 BPM
ATRIAL RATE - MUSE: 83 BPM
DIASTOLIC BLOOD PRESSURE - MUSE: NORMAL MMHG
DIASTOLIC BLOOD PRESSURE - MUSE: NORMAL MMHG
INTERPRETATION ECG - MUSE: NORMAL
INTERPRETATION ECG - MUSE: NORMAL
P AXIS - MUSE: 70 DEGREES
P AXIS - MUSE: NORMAL DEGREES
PR INTERVAL - MUSE: 134 MS
PR INTERVAL - MUSE: 138 MS
QRS DURATION - MUSE: 68 MS
QRS DURATION - MUSE: 74 MS
QT - MUSE: 382 MS
QT - MUSE: 418 MS
QTC - MUSE: 448 MS
QTC - MUSE: 488 MS
R AXIS - MUSE: 133 DEGREES
R AXIS - MUSE: 51 DEGREES
SYSTOLIC BLOOD PRESSURE - MUSE: NORMAL MMHG
SYSTOLIC BLOOD PRESSURE - MUSE: NORMAL MMHG
T AXIS - MUSE: 172 DEGREES
T AXIS - MUSE: 36 DEGREES
VENTRICULAR RATE- MUSE: 82 BPM
VENTRICULAR RATE- MUSE: 83 BPM

## 2024-11-05 ENCOUNTER — DOCUMENTATION ONLY (OUTPATIENT)
Dept: OTHER | Facility: CLINIC | Age: 49
End: 2024-11-05
Payer: COMMERCIAL

## 2024-11-05 LAB
PATH REPORT.COMMENTS IMP SPEC: NORMAL
PATH REPORT.COMMENTS IMP SPEC: NORMAL
PATH REPORT.FINAL DX SPEC: NORMAL
PATH REPORT.GROSS SPEC: NORMAL
PATH REPORT.MICROSCOPIC SPEC OTHER STN: NORMAL
PATH REPORT.RELEVANT HX SPEC: NORMAL
PHOTO IMAGE: NORMAL

## 2024-11-06 ENCOUNTER — ANCILLARY PROCEDURE (OUTPATIENT)
Dept: CARDIOLOGY | Facility: CLINIC | Age: 49
End: 2024-11-06
Payer: COMMERCIAL

## 2024-11-06 LAB — LVEF ECHO: NORMAL

## 2024-11-06 PROCEDURE — 93306 TTE W/DOPPLER COMPLETE: CPT | Performed by: INTERNAL MEDICINE

## 2024-11-06 PROCEDURE — 99207 PR STATISTIC IV PUSH SINGLE INITIAL SUBSTANCE: CPT | Performed by: INTERNAL MEDICINE

## 2024-11-06 RX ADMIN — Medication 4 ML: at 13:39

## 2024-11-15 ENCOUNTER — TELEPHONE (OUTPATIENT)
Dept: OTHER | Facility: CLINIC | Age: 49
End: 2024-11-15
Payer: COMMERCIAL

## 2024-11-15 NOTE — TELEPHONE ENCOUNTER
[facility]    Who is the name of the provider?:  MIKIE STEVENS   What is the location you see this provider at/preferred location?: [place]  Person calling / Facility: Damaris Sarmiento  Phone number:  [phone] (home)   Nurse call back needed:  Yes / route to scheduling with instructions      Reason for call:  Patient called and requested to schedule a follow up appointment with Dr. Stevens. Patient had a hysterectomy done through [place] 10/25/24. The patient reported receiving results of possible PVCs on ECG. The patient explained she is worried about potentially throwing a blood clot and was wondering if she should follow up with Dr. Stevens.       Outside Imaging: n/a   Can we leave a detailed message on this number?  YES     11/15/2024, 1:12 PM

## 2024-11-18 ENCOUNTER — MYC MEDICAL ADVICE (OUTPATIENT)
Dept: FAMILY MEDICINE | Facility: CLINIC | Age: 49
End: 2024-11-18
Payer: COMMERCIAL

## 2024-11-18 NOTE — TELEPHONE ENCOUNTER
PVCs in no way shape or form predispose to cardioembolic blood clots. If that is the only reason she wants to see me, she does not even need to see me. However, I see that the referral reason is TOS and Lymphedema, so if that is the real reason for her to see me then she should see me. However, if the only reason for the d dimer was PVCs, the d dimer should be cancelled, as it will likely be falsely elevated due to her recent surgery.

## 2024-11-18 NOTE — TELEPHONE ENCOUNTER
05/04/2022 LOV w/ Dr. Hidalgo.      Hx TOS, Lymphedema.    Routing to scheduling to coordinate the following:  RETURN VASCULAR PATIENT consult with Dr. Hidalgo  Please schedule this at next available    Appt note:  Follow up to 05/04/2022 appt with Dr. Hidalgo

## 2024-11-19 ENCOUNTER — OFFICE VISIT (OUTPATIENT)
Dept: CARDIOLOGY | Facility: CLINIC | Age: 49
End: 2024-11-19
Attending: INTERNAL MEDICINE
Payer: COMMERCIAL

## 2024-11-19 VITALS
DIASTOLIC BLOOD PRESSURE: 81 MMHG | OXYGEN SATURATION: 100 % | BODY MASS INDEX: 40.39 KG/M2 | HEART RATE: 92 BPM | WEIGHT: 228 LBS | SYSTOLIC BLOOD PRESSURE: 123 MMHG

## 2024-11-19 DIAGNOSIS — R00.2 PALPITATIONS: ICD-10-CM

## 2024-11-19 DIAGNOSIS — I49.3 PVC'S (PREMATURE VENTRICULAR CONTRACTIONS): Primary | ICD-10-CM

## 2024-11-19 PROCEDURE — 93005 ELECTROCARDIOGRAM TRACING: CPT

## 2024-11-19 PROCEDURE — 99213 OFFICE O/P EST LOW 20 MIN: CPT | Performed by: INTERNAL MEDICINE

## 2024-11-19 PROCEDURE — 93242 EXT ECG>48HR<7D RECORDING: CPT | Performed by: INTERNAL MEDICINE

## 2024-11-19 ASSESSMENT — PAIN SCALES - GENERAL: PAINLEVEL_OUTOF10: MILD PAIN (2)

## 2024-11-19 NOTE — LETTER
11/19/2024      RE: Damaris Sarmiento  [address]  [facility] [address]       Dear Colleague,    Thank you for the opportunity to participate in the care of your patient, Damaris Sarmiento, at the [facility] [place] [facility]. Please see a copy of my visit note below.       SUBJECTIVE:  Damaris Sarmiento is a 49 year old female who presents for cardiac evaluation due to an abnormal EKG showing frequent PVCs.  Patient with past medical history significant for POTS, GERD, PCOS, right subclavian vein thrombosis, discoid lupus, diverticulosis, and asthma.   October patient had a hysterectomy.  Preop EKG showed multiple PVCs.  Post discharge patient attended emergency room with postop pain.  During monitoring patient also had some T wave changes on the monitor.  She complains of intermittent palpitations.  According to her because of POTS she has plans heartbeat when she turns on the bed.  But when she get up in the morning she has skipped beats as well as occasional fast heartbeats.  No associated symptoms.  No history of sustained arrhythmia.  Clinically patient is not feeling any of this ectopy.  Patient was taking Sudafed for sinus infection.  Currently patient is cutting down the dose.  No history of excess alcohol caffeine or caffeinated drinks.  Family history of premature coronary artery disease but no history of arrhythmia.  Patient Active Problem List    Diagnosis Date Noted     Postoperative pain 10/25/2024     Priority: Medium     Acute embolism and thrombosis of left subclavian vein (H) 09/30/2024     Priority: Medium     Class 2 severe obesity due to excess calories with serious comorbidity in adult (H) 09/09/2024     Priority: Medium     Irregular bowel habits 09/18/2023     Priority: Medium     Abdominal bloating 09/18/2023     Priority: Medium     Polyp of duodenum 12/13/2022     Priority: Medium     Heartburn 12/13/2022     Priority:  Medium     Diaphragmatic hernia 12/13/2022     Priority: Medium     Chest pain 12/13/2022     Priority: Medium     Arthralgia, unspecified joint 08/20/2022     Priority: Medium     Dysmenorrhea 08/20/2022     Priority: Medium     Alternating constipation and diarrhea 08/20/2022     Priority: Medium     Thoracic outlet syndrome associated with cervical rib 08/20/2022     Priority: Medium     Abdominal distension, gaseous 06/29/2022     Priority: Medium     Mast cell activation syndrome (H) 05/30/2022     Priority: Medium     Urticaria, chronic 05/01/2022     Priority: Medium     Spondylolisthesis at L4-L5 level 09/03/2021     Priority: Medium     POTS (postural orthostatic tachycardia syndrome) 07/19/2021     Priority: Medium     Added automatically from request for surgery [id number]       Hemorrhoids 06/14/2021     Priority: Medium     Diverticular disease of large intestine 06/14/2021     Priority: Medium     Venous thoracic outlet syndrome of right subclavian vein 07/26/2019     Priority: Medium     History of DVT (deep vein thrombosis) 07/26/2019     Priority: Medium     Menorrhagia with regular cycle 07/26/2019     Priority: Medium     PCOS (polycystic ovarian syndrome) 07/26/2019     Priority: Medium     Undifferentiated spondyloarthropathy 02/15/2019     Priority: Medium     Fibromyalgia 02/15/2019     Priority: Medium     Tendinopathy of left gluteus medius 01/10/2019     Priority: Medium     Tear of left acetabular labrum 11/27/2018     Priority: Medium     Sprain of hip 11/27/2018     Priority: Medium     Closed fracture of fifth lumbar vertebra (H) 02/03/2018     Priority: Medium     Formatting of this note might be different from the original. L5-S1       Lymphedema 12/08/2017     Priority: Medium     Obesity with body mass index 30 or greater 10/11/2016     Priority: Medium     Increased immunoglobulin 07/19/2016     Priority: Medium     GERD (gastroesophageal reflux disease) 10/04/2012     Priority:  Medium    .  Current Outpatient Medications   Medication Sig Dispense Refill     acetaminophen (TYLENOL) 650 MG CR tablet Take 1,300 mg by mouth 2 times daily.       BABY ASPIRIN PO Take 81 mg by mouth daily.       budesonide (ENTOCORT EC) 3 MG EC capsule        budesonide (RINOCORT AQUA) 32 MCG/ACT nasal spray Spray 1 spray into both nostrils daily       CLARITIN 10 MG OR TABS Take 10 mg by mouth 2 times daily.       clobetasol (TEMOVATE) 0.05 % external ointment APPLY TOPICALLY TO THE AFFECTED AREA TWICE DAILY FOR 14 DAYS AS NEEDED FOR FLARE       EPINEPHrine (ANY BX GENERIC EQUIV) 0.3 MG/0.3ML injection 2-pack INJECT 1 PEN IN THE MUSCLE ONE TIME AS DIRECTED       famotidine (PEPCID) 20 MG tablet Take 20 mg by mouth daily.       fludrocortisone (FLORINEF) 0.1 MG tablet Take 0.1 mg by mouth daily.       fluocinonide (LIDEX) 0.05 % external ointment Apply topically as needed.       ketorolac tromethamine (ACULAR-LS) 0.4 % SOLN ophthalmic solution Place 1 drop into both eyes daily.       ketotifen (ZADITOR/REFRESH ANTI-ITCH) 0.025 % SOLN ophthalmic solution Place 1 drop into both eyes daily.       levocetirizine (XYZAL) 5 MG tablet Take 5 mg by mouth 2 times daily.       Lidocaine (LIDOCARE) 4 % Patch Place 1 patch onto the skin every evening.       mupirocin (BACTROBAN) 2 % ointment Apply topically as needed.  0     polyethylene glycol (MIRALAX) 17 GM/Dose powder Take 1 capful by mouth daily       pseudoePHEDrine (SUDAFED) 120 MG 12 hr tablet Take 120 mg by mouth every 24 hours.       SODIUM CHLORIDE-SODIUM BICARB NA Spray in nostril. Ericka Pot       tacrolimus (PROTOPIC) 0.1 % external ointment Apply topically as needed.       triamcinolone (KENALOG) 0.1 % external cream Apply topically as needed.       triamcinolone (NASACORT ALLERGY 24HR) 55 MCG/ACT nasal aerosol Spray 1 Units in nostril every 24 hours       UNABLE TO FIND Take by mouth 2 times daily. MEDICATION NAME: Salt Stick Vitassium       vitamin B-12  (CYANOCOBALAMIN) 1000 MCG tablet Take 1,000 mcg by mouth every other day.       vitamin D3 (CHOLECALCIFEROL) 2000 units (50 mcg) tablet Take 1 tablet by mouth daily.       XOLAIR 150 MG/ML SOSY injection Inject 150 mg subcutaneously every 28 days.       LANsoprazole (PREVACID) 15 MG DR capsule Take 15 mg by mouth daily. (Patient not taking: Reported on 11/19/2024)       traMADol (ULTRAM) 50 MG tablet Take 1 tablet (50 mg) by mouth every 6 hours as needed for severe pain. 6 tablet 0     No current facility-administered medications for this visit.     Past Medical History:   Diagnosis Date     Alternating constipation and diarrhea 08/20/2022     Asthma      Discoid lupus      Diverticulosis of large intestine 06/2021    FOUND SCREENING SCOPE, TRANSVERSE/DESCENDING     Dysmenorrhea 08/20/2022     GERD (gastroesophageal reflux disease) 10/04/2012     Menorrhagia with regular cycle 07/26/2019     Polycystic ovarian syndrome      PONV (postoperative nausea and vomiting)      POTS (postural orthostatic tachycardia syndrome) 08/2021     RW ALLERGIES/IMMUNOLOGY (ABSTRACTED)      Sinusitis      Subclavian vein thrombosis, right (H) 2000     Thoracic outlet syndrome associated with cervical rib 08/20/2022     Past Surgical History:   Procedure Laterality Date     BIOPSY      Sinus surgery 7/2014; Flex Sigmoidoscopy 2013     CYSTOSCOPY N/A 10/25/2024    Procedure: CYSTOSCOPY;  Surgeon: Joslyn Dunn MD;  Location:  OR     DAVINCI HYSTERECTOMY TOTAL, BILATERAL SALPINGO-OOPHORECTOMY, COMBINED N/A 10/25/2024    Procedure: HYSTERECTOMY, TOTAL, ROBOT-ASSISTED, LAPAROSCOPIC, WITH BILATERAL SALPINGO-OOPHORECTOMY AND;  Surgeon: Joslyn Dunn MD;  Location:  OR     ENDOSCOPIC SINUS SURGERY  07/16/2014    Procedure: ENDOSCOPIC SINUS SURGERY;  Surgeon: Suzi Vieyra MD;  Location: [facility]     ENT SURGERY  July 2014    bilateral etmoodectomy&maxillary anstronomy,turbinate resect     EP STUDY TILT TABLE N/A  08/20/2021    Procedure: EP TILT TABLE;  Surgeon: Ilya Gilmore MD;  Location:  [facility] CATH LAB     EYE SURGERY  6805-0233    PRK LASIK, Left eye May2924, Right Eye May2015     ORTHOPEDIC SURGERY       SOFT TISSUE SURGERY  4560-1532    Several venoplasty balloon-R subclavian vein     THORACIC SURGERY  12/2000    resection of R first rib-subclavian vein clot thoracic outle     TURBINECTOMY  07/16/2014    Procedure: TURBINECTOMY;  Surgeon: Suzi Vieyra MD;  Location: [facility] NONSPECIFIC PROCEDURE  12/01/2000    resection of first rib     [facility] NONSPECIFIC PROCEDURE  01/01/1992    left foot surgery     Allergies   Allergen Reactions     Adhesive Tape Hives     Cat Hair Extract Hives     Dogs Hives     wheezing     Dust Mites      Other reaction(s): Wheezing  eye mucous     Erythromycin      Gluten Meal      Other reaction(s): GI intolerance     Grass Hives     eye mucous     No Clinical Screening - See Comments Hives, Itching and Photosensitivity     Oxycodone      Other reaction(s): Nausea, cramps, dizzyness     Pollen Extract      Other reaction(s): Other (see comments)  Tree pollen-hives, eye mucous     Ragweeds Hives     eye mucous     Red Dye #40 (Allura Red) Hives     Trees      Other reaction(s): Other (see comments)  Sinus issues     Wheat Hives     Other reaction(s): GI intolerance     Social History     Socioeconomic History     Marital status:      Spouse name: JAMES     Number of children: Not on file     Years of education: Not on file     Highest education level: Not on file   Occupational History     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 No   Social History Narrative     Not on file     Social Drivers of Health     Financial Resource Strain: Low Risk  (8/11/2024)    Financial Resource Strain      Within the past 12 months, have you or your family members you live with been unable to get utilities (heat, electricity) when it was really needed?: No   Food Insecurity: Low Risk  (8/11/2024)    Food Insecurity      Within the past 12 months, did you worry that your food would run out before you got money to buy more?: No      Within the past 12 months, did the food you bought just not last and you didn t have money to get more?: No   Transportation Needs: Low Risk  (8/11/2024)    Transportation Needs      Within the past 12 months, has lack of transportation kept you from medical appointments, getting your medicines, non-medical meetings or appointments, work, or from getting things that you need?: No   Physical Activity: Insufficiently Active (8/11/2024)    Exercise Vital Sign      Days of Exercise per Week: 4 days      Minutes of Exercise per Session: 30 min   Stress: No Stress Concern Present (8/11/2024)    [place] [employer] - Occupational Stress Questionnaire      Feeling of Stress : Only a little   Social Connections: Unknown (8/11/2024)    Social Connection and Isolation Panel [NHANES]      Frequency of Communication with Friends and Family: Not on file      Frequency of Social Gatherings with Friends and Family: Once a week      Attends Protestant Services: Not on file      Active Member of Clubs or Organizations: Not on file      Attends Club or Organization Meetings: Not on file      Marital Status: Not on file   Interpersonal Safety: Low Risk  (10/25/2024)    Interpersonal Safety      Do you feel physically and emotionally safe where you currently  live?: Yes      Within the past 12 months, have you been hit, slapped, kicked or otherwise physically hurt by someone?: No      Within the past 12 months, have you been humiliated or emotionally abused in other ways by your partner or ex-partner?: No   Housing Stability: Low Risk  (8/11/2024)    Housing Stability      Do you have housing? : Yes      Are you worried about losing your housing?: No     Family History   Problem Relation Age of Onset     Osteoporosis Mother      Musculoskeletal Disorder Father         Paget's disease      Hyperlipidemia Father      Coronary Artery Disease Father      Allergies Sister      Other - See Comments Sister         endometriosis     Cerebrovascular Disease Sister 47        stroke after recent ocp start for perimenopausal sx     Cancer Maternal Grandmother         lung     Other Cancer Maternal Grandmother         Lung Cancer     Osteoporosis Maternal Grandmother      EYE* Maternal Grandfather         glaucoma     Cancer - colorectal Paternal Grandfather      Prostate Cancer Paternal Grandfather      Colon Cancer Paternal Grandfather      Musculoskeletal Disorder Paternal Uncle         Paget's disease     Breast Cancer Paternal Cousin 55        Second cousin     Cerebrovascular Disease Paternal Cousin 44          REVIEW OF SYSTEMS:  General: negative, fever, chills, night sweats  Skin: negative, acne, rash, and scaling  Eyes: negative, double vision, eye pain, and photophobia  Ears/Nose/Throat: negative, nasal congestion, and purulent rhinorrhea  Respiratory: No dyspnea on exertion, No cough, No hemoptysis, and negative  Cardiovascular: negative, chest pain, exertional chest pain or pressure, paroxysmal nocturnal dyspnea, dyspnea on exertion, and orthopnea         OBJECTIVE:  Blood pressure 123/81, pulse 92, weight 103.4 kg (228 lb), last menstrual period 10/06/2024, SpO2 100%, not currently breastfeeding.  General Appearance: healthy, alert, and no distress  Head: Normocephalic.  No masses, lesions, tenderness or abnormalities  Eyes: conjuctiva clear, PERRL, EOM intact  Ears: External ears normal. Canals clear. TM's normal.  Nose: Nares normal  Mouth: normal  Neck: Supple, no cervical adenopathy, no thyromegaly  Lungs: clear to auscultation  Cardiac: regular rate and rhythm, normal S1 and S2, no murmur       ASSESSMENT/PLAN:  Patient here for cardiac evaluation due to a preop abnormal EKG.  Last month patient had hysterectomy.  Preop patient had an EKG which showed normal sinus rhythm and frequent PVCs.  Postop patient had an admission to hospital for management of pain.  During this hospitalization monitor showed some T wave changes on the telemetry.  Complains of intermittent palpitations.  Patient to have fast heartbeat when she turns on the bed and she blames it on POTS.  But when she get up in the morning she has skipped beats as well as occasional fast heartbeats.  No associated symptoms.  No history of sustained arrhythmia dizziness or loss of consciousness.  No significant risk factors.  Not on any cardiac medications.  No excess alcohol caffeine or caffeinated drinks.  There is a family history of premature coronary artery disease but no history of arrhythmia.  Patient's EKG is reviewed.  Normal sinus rhythm normal EKG apart from recent 2 EKGs showing ectopy.  Today's EKG at clinic reviewed.  Normal sinus rhythm normal EKG.  Patient had an echocardiogram.  This showed normal biventricular function and no significant valvular abnormalities.  Discussed mostly benign nature of his PVCs.  Patient was also taking Sudafed during this.  Currently she is weaning off.  Discussed the fact that isolated PVCs do not require any treatment.  Will plan for a 7-day Zio patch to assess the PVC burden and then decide further plan.  Patient will be contacted with the test result and further management strategy.  Total visit duration 45 minutes.  This include face-to-face interview, physical exam,  chart review, review of hospital records, multiple EKGs echocardiogram and documentation.    Please do not hesitate to contact me if you have any questions/concerns.     Sincerely,     YAMILA Teresa MD

## 2024-11-19 NOTE — NURSING NOTE
Damaris Sarmiento arrived here on 11/19/2024 9:29 AM for 3-7 Days  Zio monitor placement per ordering provider Dr. Pandey for the diagnosis palpitations.  Dr. Vega is the supervising MD. Patient s skin was prepped per protocol.  Zio monitor was placed.  Instructions were reviewed with and given to the patient.  Patient verbalized understanding of wear, troubleshooting and monitor return instructions.

## 2024-11-19 NOTE — NURSING NOTE
Chief Complaint   Patient presents with    New Patient      NEW CARDIOLOGY     Vitals were taken, medications reconciled, and EKG was performed.    Ranjeet Goyal, EMT  8:54 AM

## 2024-11-19 NOTE — PATIENT INSTRUCTIONS
Ziopatch cardiac monitor placed today in clinic, to be worn for 7 days.  Please mail as soon as completed. Results take approximately 10-14 days from mailing.  Follow up based on results.

## 2024-11-19 NOTE — PROGRESS NOTES
SUBJECTIVE:  Damaris Sarmiento is a 49 year old female who presents for cardiac evaluation due to an abnormal EKG showing frequent PVCs.  Patient with past medical history significant for POTS, GERD, PCOS, right subclavian vein thrombosis, discoid lupus, diverticulosis, and asthma.   October patient had a hysterectomy.  Preop EKG showed multiple PVCs.  Post discharge patient attended emergency room with postop pain.  During monitoring patient also had some T wave changes on the monitor.  She complains of intermittent palpitations.  According to her because of POTS she has plans heartbeat when she turns on the bed.  But when she get up in the morning she has skipped beats as well as occasional fast heartbeats.  No associated symptoms.  No history of sustained arrhythmia.  Clinically patient is not feeling any of this ectopy.  Patient was taking Sudafed for sinus infection.  Currently patient is cutting down the dose.  No history of excess alcohol caffeine or caffeinated drinks.  Family history of premature coronary artery disease but no history of arrhythmia.  Patient Active Problem List    Diagnosis Date Noted    Postoperative pain 10/25/2024     Priority: Medium    Acute embolism and thrombosis of left subclavian vein (H) 09/30/2024     Priority: Medium    Class 2 severe obesity due to excess calories with serious comorbidity in adult (H) 09/09/2024     Priority: Medium    Irregular bowel habits 09/18/2023     Priority: Medium    Abdominal bloating 09/18/2023     Priority: Medium    Polyp of duodenum 12/13/2022     Priority: Medium    Heartburn 12/13/2022     Priority: Medium    Diaphragmatic hernia 12/13/2022     Priority: Medium    Chest pain 12/13/2022     Priority: Medium    Arthralgia, unspecified joint 08/20/2022     Priority: Medium    Dysmenorrhea 08/20/2022     Priority: Medium    Alternating constipation and diarrhea 08/20/2022     Priority: Medium    Thoracic outlet syndrome associated with cervical rib  08/20/2022     Priority: Medium    Abdominal distension, gaseous 06/29/2022     Priority: Medium    Mast cell activation syndrome (H) 05/30/2022     Priority: Medium    Urticaria, chronic 05/01/2022     Priority: Medium    Spondylolisthesis at L4-L5 level 09/03/2021     Priority: Medium    POTS (postural orthostatic tachycardia syndrome) 07/19/2021     Priority: Medium     Added automatically from request for surgery [id number]      Hemorrhoids 06/14/2021     Priority: Medium    Diverticular disease of large intestine 06/14/2021     Priority: Medium    Venous thoracic outlet syndrome of right subclavian vein 07/26/2019     Priority: Medium    History of DVT (deep vein thrombosis) 07/26/2019     Priority: Medium    Menorrhagia with regular cycle 07/26/2019     Priority: Medium    PCOS (polycystic ovarian syndrome) 07/26/2019     Priority: Medium    Undifferentiated spondyloarthropathy 02/15/2019     Priority: Medium    Fibromyalgia 02/15/2019     Priority: Medium    Tendinopathy of left gluteus medius 01/10/2019     Priority: Medium    Tear of left acetabular labrum 11/27/2018     Priority: Medium    Sprain of hip 11/27/2018     Priority: Medium    Closed fracture of fifth lumbar vertebra (H) 02/03/2018     Priority: Medium     Formatting of this note might be different from the original. L5-S1      Lymphedema 12/08/2017     Priority: Medium    Obesity with body mass index 30 or greater 10/11/2016     Priority: Medium    Increased immunoglobulin 07/19/2016     Priority: Medium    GERD (gastroesophageal reflux disease) 10/04/2012     Priority: Medium    .  Current Outpatient Medications   Medication Sig Dispense Refill    acetaminophen (TYLENOL) 650 MG CR tablet Take 1,300 mg by mouth 2 times daily.      BABY ASPIRIN PO Take 81 mg by mouth daily.      budesonide (ENTOCORT EC) 3 MG EC capsule       budesonide (RINOCORT AQUA) 32 MCG/ACT nasal spray Spray 1 spray into both nostrils daily      CLARITIN 10 MG OR TABS Take 10  mg by mouth 2 times daily.      clobetasol (TEMOVATE) 0.05 % external ointment APPLY TOPICALLY TO THE AFFECTED AREA TWICE DAILY FOR 14 DAYS AS NEEDED FOR FLARE      EPINEPHrine (ANY BX GENERIC EQUIV) 0.3 MG/0.3ML injection 2-pack INJECT 1 PEN IN THE MUSCLE ONE TIME AS DIRECTED      famotidine (PEPCID) 20 MG tablet Take 20 mg by mouth daily.      fludrocortisone (FLORINEF) 0.1 MG tablet Take 0.1 mg by mouth daily.      fluocinonide (LIDEX) 0.05 % external ointment Apply topically as needed.      ketorolac tromethamine (ACULAR-LS) 0.4 % SOLN ophthalmic solution Place 1 drop into both eyes daily.      ketotifen (ZADITOR/REFRESH ANTI-ITCH) 0.025 % SOLN ophthalmic solution Place 1 drop into both eyes daily.      levocetirizine (XYZAL) 5 MG tablet Take 5 mg by mouth 2 times daily.      Lidocaine (LIDOCARE) 4 % Patch Place 1 patch onto the skin every evening.      mupirocin (BACTROBAN) 2 % ointment Apply topically as needed.  0    polyethylene glycol (MIRALAX) 17 GM/Dose powder Take 1 capful by mouth daily      pseudoePHEDrine (SUDAFED) 120 MG 12 hr tablet Take 120 mg by mouth every 24 hours.      SODIUM CHLORIDE-SODIUM BICARB NA Spray in nostril. Ericka Pot      tacrolimus (PROTOPIC) 0.1 % external ointment Apply topically as needed.      triamcinolone (KENALOG) 0.1 % external cream Apply topically as needed.      triamcinolone (NASACORT ALLERGY 24HR) 55 MCG/ACT nasal aerosol Spray 1 Units in nostril every 24 hours      UNABLE TO FIND Take by mouth 2 times daily. MEDICATION NAME: Salt Stick Vitassium      vitamin B-12 (CYANOCOBALAMIN) 1000 MCG tablet Take 1,000 mcg by mouth every other day.      vitamin D3 (CHOLECALCIFEROL) 2000 units (50 mcg) tablet Take 1 tablet by mouth daily.      XOLAIR 150 MG/ML SOSY injection Inject 150 mg subcutaneously every 28 days.      LANsoprazole (PREVACID) 15 MG DR capsule Take 15 mg by mouth daily. (Patient not taking: Reported on 11/19/2024)      traMADol (ULTRAM) 50 MG tablet Take 1  tablet (50 mg) by mouth every 6 hours as needed for severe pain. 6 tablet 0     No current facility-administered medications for this visit.     Past Medical History:   Diagnosis Date    Alternating constipation and diarrhea 08/20/2022    Asthma     Discoid lupus     Diverticulosis of large intestine 06/2021    FOUND SCREENING SCOPE, TRANSVERSE/DESCENDING    Dysmenorrhea 08/20/2022    GERD (gastroesophageal reflux disease) 10/04/2012    Menorrhagia with regular cycle 07/26/2019    Polycystic ovarian syndrome     PONV (postoperative nausea and vomiting)     POTS (postural orthostatic tachycardia syndrome) 08/2021    RW ALLERGIES/IMMUNOLOGY (ABSTRACTED)     Sinusitis     Subclavian vein thrombosis, right (H) 2000    Thoracic outlet syndrome associated with cervical rib 08/20/2022     Past Surgical History:   Procedure Laterality Date    BIOPSY      Sinus surgery 7/2014; Flex Sigmoidoscopy 2013    CYSTOSCOPY N/A 10/25/2024    Procedure: CYSTOSCOPY;  Surgeon: Joslyn Dunn MD;  Location:  OR    DAVINCI HYSTERECTOMY TOTAL, BILATERAL SALPINGO-OOPHORECTOMY, COMBINED N/A 10/25/2024    Procedure: HYSTERECTOMY, TOTAL, ROBOT-ASSISTED, LAPAROSCOPIC, WITH BILATERAL SALPINGO-OOPHORECTOMY AND;  Surgeon: Joslyn Dunn MD;  Location:  OR    ENDOSCOPIC SINUS SURGERY  07/16/2014    Procedure: ENDOSCOPIC SINUS SURGERY;  Surgeon: Suzi Vieyra MD;  Location: [facility]    ENT SURGERY  July 2014    bilateral etmoodectomy&maxillary anstronomy,turbinate resect    EP STUDY TILT TABLE N/A 08/20/2021    Procedure: EP TILT TABLE;  Surgeon: Ilya Gilmore MD;  Location:  [facility]ATH LAB    EYE SURGERY  [id number]    PRK LASIK, Left eye May2924, Right Eye May2015    ORTHOPEDIC SURGERY      SOFT TISSUE SURGERY  [id number]    Several venoplasty balloon-R subclavian vein    THORACIC SURGERY  12/2000    resection of R first rib-subclavian vein clot thoracic outle    TURBINECTOMY  07/16/2014    Procedure:  TURBINECTOMY;  Surgeon: Suzi Vieyra MD;  Location: [facility] NONSPECIFIC PROCEDURE  12/01/2000    resection of first rib    [facility] NONSPECIFIC PROCEDURE  01/01/1992    left foot surgery     Allergies   Allergen Reactions    Adhesive Tape Hives    Cat Hair Extract Hives    Dogs Hives     wheezing    Dust Mites      Other reaction(s): Wheezing  eye mucous    Erythromycin     Gluten Meal      Other reaction(s): GI intolerance    Grass Hives     eye mucous    No Clinical Screening - See Comments Hives, Itching and Photosensitivity    Oxycodone      Other reaction(s): Nausea, cramps, dizzyness    Pollen Extract      Other reaction(s): Other (see comments)  Tree pollen-hives, eye mucous    Ragweeds Hives     eye mucous    Red Dye #40 (Allura Red) Hives    Trees      Other reaction(s): Other (see comments)  Sinus issues    Wheat Hives     Other reaction(s): GI intolerance     Social History     Socioeconomic History    Marital status:      Spouse name: JAMES    Number of children: Not on file    Years of education: Not on file    Highest education level: Not on file   Occupational History    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 No   Social History Narrative    Not on file     Social Drivers of Health     Financial Resource Strain: Low Risk  (8/11/2024)    Financial Resource Strain     Within the past 12 months, have you or your family members you live with been unable to get utilities (heat, electricity) when it was  really needed?: No   Food Insecurity: Low Risk  (8/11/2024)    Food Insecurity     Within the past 12 months, did you worry that your food would run out before you got money to buy more?: No     Within the past 12 months, did the food you bought just not last and you didn t have money to get more?: No   Transportation Needs: Low Risk  (8/11/2024)    Transportation Needs     Within the past 12 months, has lack of transportation kept you from medical appointments, getting your medicines, non-medical meetings or appointments, work, or from getting things that you need?: No   Physical Activity: Insufficiently Active (8/11/2024)    Exercise Vital Sign     Days of Exercise per Week: 4 days     Minutes of Exercise per Session: 30 min   Stress: No Stress Concern Present (8/11/2024)    [place] [employer] - Occupational Stress Questionnaire     Feeling of Stress : Only a little   Social Connections: Unknown (8/11/2024)    Social Connection and Isolation Panel [NHANES]     Frequency of Communication with Friends and Family: Not on file     Frequency of Social Gatherings with Friends and Family: Once a week     Attends Denominational Services: Not on file     Active Member of Clubs or Organizations: Not on file     Attends Club or Organization Meetings: Not on file     Marital Status: Not on file   Interpersonal Safety: Low Risk  (10/25/2024)    Interpersonal Safety     Do you feel physically and emotionally safe where you currently live?: Yes     Within the past 12 months, have you been hit, slapped, kicked or otherwise physically hurt by someone?: No     Within the past 12 months, have you been humiliated or emotionally abused in other ways by your partner or ex-partner?: No   Housing Stability: Low Risk  (8/11/2024)    Housing Stability     Do you have housing? : Yes     Are you worried about losing your housing?: No     Family History   Problem Relation Age of Onset    Osteoporosis Mother      Musculoskeletal Disorder Father         Paget's disease     Hyperlipidemia Father     Coronary Artery Disease Father     Allergies Sister     Other - See Comments Sister         endometriosis    Cerebrovascular Disease Sister 47        stroke after recent ocp start for perimenopausal sx    Cancer Maternal Grandmother         lung    Other Cancer Maternal Grandmother         Lung Cancer    Osteoporosis Maternal Grandmother     EYE* Maternal Grandfather         glaucoma    Cancer - colorectal Paternal Grandfather     Prostate Cancer Paternal Grandfather     Colon Cancer Paternal Grandfather     Musculoskeletal Disorder Paternal Uncle         Paget's disease    Breast Cancer Paternal Cousin 55        Second cousin    Cerebrovascular Disease Paternal Cousin 44          REVIEW OF SYSTEMS:  General: negative, fever, chills, night sweats  Skin: negative, acne, rash, and scaling  Eyes: negative, double vision, eye pain, and photophobia  Ears/Nose/Throat: negative, nasal congestion, and purulent rhinorrhea  Respiratory: No dyspnea on exertion, No cough, No hemoptysis, and negative  Cardiovascular: negative, chest pain, exertional chest pain or pressure, paroxysmal nocturnal dyspnea, dyspnea on exertion, and orthopnea         OBJECTIVE:  Blood pressure 123/81, pulse 92, weight 103.4 kg (228 lb), last menstrual period 10/06/2024, SpO2 100%, not currently breastfeeding.  General Appearance: healthy, alert, and no distress  Head: Normocephalic. No masses, lesions, tenderness or abnormalities  Eyes: conjuctiva clear, PERRL, EOM intact  Ears: External ears normal. Canals clear. TM's normal.  Nose: Nares normal  Mouth: normal  Neck: Supple, no cervical adenopathy, no thyromegaly  Lungs: clear to auscultation  Cardiac: regular rate and rhythm, normal S1 and S2, no murmur       ASSESSMENT/PLAN:  Patient here for cardiac evaluation due to a preop abnormal EKG.  Last month patient had hysterectomy.  Preop patient had an EKG which  showed normal sinus rhythm and frequent PVCs.  Postop patient had an admission to hospital for management of pain.  During this hospitalization monitor showed some T wave changes on the telemetry.  Complains of intermittent palpitations.  Patient to have fast heartbeat when she turns on the bed and she blames it on POTS.  But when she get up in the morning she has skipped beats as well as occasional fast heartbeats.  No associated symptoms.  No history of sustained arrhythmia dizziness or loss of consciousness.  No significant risk factors.  Not on any cardiac medications.  No excess alcohol caffeine or caffeinated drinks.  There is a family history of premature coronary artery disease but no history of arrhythmia.  Patient's EKG is reviewed.  Normal sinus rhythm normal EKG apart from recent 2 EKGs showing ectopy.  Today's EKG at clinic reviewed.  Normal sinus rhythm normal EKG.  Patient had an echocardiogram.  This showed normal biventricular function and no significant valvular abnormalities.  Discussed mostly benign nature of his PVCs.  Patient was also taking Sudafed during this.  Currently she is weaning off.  Discussed the fact that isolated PVCs do not require any treatment.  Will plan for a 7-day Zio patch to assess the PVC burden and then decide further plan.  Patient will be contacted with the test result and further management strategy.  Total visit duration 45 minutes.  This include face-to-face interview, physical exam, chart review, review of hospital records, multiple EKGs echocardiogram and documentation.

## 2024-11-20 LAB
ATRIAL RATE - MUSE: 78 BPM
DIASTOLIC BLOOD PRESSURE - MUSE: NORMAL MMHG
INTERPRETATION ECG - MUSE: NORMAL
P AXIS - MUSE: 54 DEGREES
PR INTERVAL - MUSE: 134 MS
QRS DURATION - MUSE: 78 MS
QT - MUSE: 398 MS
QTC - MUSE: 453 MS
R AXIS - MUSE: 48 DEGREES
SYSTOLIC BLOOD PRESSURE - MUSE: NORMAL MMHG
T AXIS - MUSE: 35 DEGREES
VENTRICULAR RATE- MUSE: 78 BPM

## 2024-11-26 ENCOUNTER — MYC MEDICAL ADVICE (OUTPATIENT)
Dept: OBGYN | Facility: CLINIC | Age: 49
End: 2024-11-26
Payer: COMMERCIAL

## 2024-11-26 NOTE — TELEPHONE ENCOUNTER
Called patient after looking at photos with TRENTON Henson. Patient should keep incision open to air, can put some antibiotic ointment on it if she really wants to. But should really let air get to it and keep clean and dry. Gave patient signs and symptoms of infection to watch for and call back with. No current signs of infection.

## 2024-12-02 ENCOUNTER — THERAPY VISIT (OUTPATIENT)
Dept: OCCUPATIONAL THERAPY | Facility: CLINIC | Age: 49
End: 2024-12-02
Payer: COMMERCIAL

## 2024-12-02 DIAGNOSIS — I89.0 LYMPHEDEMA: Primary | ICD-10-CM

## 2024-12-02 DIAGNOSIS — Z86.718 HISTORY OF DVT (DEEP VEIN THROMBOSIS): ICD-10-CM

## 2024-12-02 DIAGNOSIS — I87.1 VENOUS THORACIC OUTLET SYNDROME OF RIGHT SUBCLAVIAN VEIN: ICD-10-CM

## 2024-12-02 DIAGNOSIS — I89.0 LYMPHEDEMA OF ARM: ICD-10-CM

## 2024-12-02 PROCEDURE — 97140 MANUAL THERAPY 1/> REGIONS: CPT | Mod: GO

## 2024-12-03 LAB — CV ZIO PRELIM RESULTS: NORMAL

## 2024-12-09 ENCOUNTER — LAB (OUTPATIENT)
Dept: LAB | Facility: CLINIC | Age: 49
End: 2024-12-09
Attending: OBSTETRICS & GYNECOLOGY
Payer: COMMERCIAL

## 2024-12-09 ENCOUNTER — OFFICE VISIT (OUTPATIENT)
Dept: OBGYN | Facility: CLINIC | Age: 49
End: 2024-12-09
Attending: OBSTETRICS & GYNECOLOGY
Payer: COMMERCIAL

## 2024-12-09 VITALS
HEART RATE: 93 BPM | BODY MASS INDEX: 40.61 KG/M2 | DIASTOLIC BLOOD PRESSURE: 83 MMHG | WEIGHT: 229.2 LBS | SYSTOLIC BLOOD PRESSURE: 146 MMHG | HEIGHT: 63 IN

## 2024-12-09 DIAGNOSIS — E89.40 SURGICAL MENOPAUSE: Primary | ICD-10-CM

## 2024-12-09 DIAGNOSIS — D89.40 MAST CELL ACTIVATION SYNDROME (H): ICD-10-CM

## 2024-12-09 DIAGNOSIS — N94.6 DYSMENORRHEA: ICD-10-CM

## 2024-12-09 DIAGNOSIS — R63.5 WEIGHT GAIN: ICD-10-CM

## 2024-12-09 DIAGNOSIS — N80.9 ENDOMETRIOSIS: ICD-10-CM

## 2024-12-09 LAB
CRP SERPL-MCNC: 6.77 MG/L
ERYTHROCYTE [SEDIMENTATION RATE] IN BLOOD BY WESTERGREN METHOD: 56 MM/HR (ref 0–20)
TSH SERPL DL<=0.005 MIU/L-ACNC: 1.9 UIU/ML (ref 0.3–4.2)

## 2024-12-09 PROCEDURE — 85652 RBC SED RATE AUTOMATED: CPT

## 2024-12-09 PROCEDURE — 36415 COLL VENOUS BLD VENIPUNCTURE: CPT

## 2024-12-09 PROCEDURE — 86140 C-REACTIVE PROTEIN: CPT

## 2024-12-09 PROCEDURE — 99213 OFFICE O/P EST LOW 20 MIN: CPT | Performed by: STUDENT IN AN ORGANIZED HEALTH CARE EDUCATION/TRAINING PROGRAM

## 2024-12-09 PROCEDURE — 84443 ASSAY THYROID STIM HORMONE: CPT

## 2024-12-09 RX ORDER — ESTRADIOL 0.03 MG/D
1 FILM, EXTENDED RELEASE TRANSDERMAL
Qty: 30 PATCH | Refills: 3 | Status: SHIPPED | OUTPATIENT
Start: 2024-12-09

## 2024-12-09 NOTE — NURSING NOTE
Chief Complaint   Patient presents with    Post-op Visit     6 weeks post op: DOS- 10/25/24, HYSTERECTOMY, TOTAL, ROBOT-ASSISTED, LAPAROSCOPIC, WITH BILATERAL SALPINGO-OOPHORECTOMY AND CYSTOSCOPY

## 2024-12-09 NOTE — LETTER
"2024       RE: Damaris Sarmiento  [address]  [facility] [address]     Dear Colleague,    Thank you for referring your patient, Damaris Sarmiento, to the [facility] [place] at [facility]. Please see a copy of my visit note below.    [facility]  Post-Operative Visit    S: Denies vaginal bleeding. Voiding spontaneously, passing flatus and BM, pain is well controlled, tolerating food without nausea/vomiting, ambulating independently. Had some drainage from umbilical incision initially but has now resolved.    Has history of lymphedema. Edema from surgery resolved, but now has suddenly gained ~7lbs of weight. Unsure why as she has been ambulating a lot (even more than prior to surgery as her pain is now much improved).    O: BP (!) 146/83 (BP Location: Left arm, Patient Position: Sitting, Cuff Size: Adult Large)   Pulse 93   Ht 1.6 m (5' 3\")   Wt 104 kg (229 lb 3.2 oz)   LMP 10/06/2024 (Within Days)   BMI 40.60 kg/m      Gen: Well-appearing, NAD  Cardio: Well-perfused  Pulm: Breathing comfortably  Abd: S/NT/ND, incisions well-approximated w/out drainage or erythema  Extrem: 1+ edema, nontender    Labs  Pathology (10/25/2024)  Final Diagnosis   Uterus, cervix, bilateral ovaries and fallopian tubes, robotic assisted total laparoscopic hysterectomy and bilateral salpingo-oophorectomy:  -Late secretory endometrium  -Myometrial leiomyoma (3.2 cm)  -Unremarkable uterine serosa  -Unremarkable cervix  -LEFT ovarian follicular cyst (4.2 cm) and corpus luteum  -RIGHT ovarian cystic follicles  -LEFT fallopian tube with paratubal cyst  -Unremarkable RIGHT fallopian tube   -Negative for malignancy          A/P:  Damaris Sarmiento is a 49 year old  here for 6 week post-operative visit following RA-TLH, BSO.    Post-operative visit  - Bleeding is stable, patient is doing well    Weight gain  - TSH ordered to evaluate for thyroid dysfunction as " cause of weight gain  - Continue to monitor over next few weeks. If persistent, will place PCP referral to discuss this further.    Surgical menopause  - Has history of subclavian vein thrombosis which was provoked due to thoracic outlet syndrome. No recommendation for lifelong anticoagulation as the risk resolved with resection of first rib. No contraindications for HRT  - Discussed risks of menopause as listed below and patient would like to proceed. She does not have a uterus so okay for estrogen only therapy. Will plan for vivelle-dot 0.025mcg 2x/weekly and increase dose q3-4 weeks pending symptoms  - We discussed non-hormonal options and life-style changes as well.  - Midlife packet given.      Hormone Therapy Increased Risk Decreased Risk No Change in Risk     Start within 10  years of menopause,   <59 yo    VTE (11/10,000 woman years)  Breast Ca (E&P but not E alone <1/1000)  GB Disease (47/10,000 EPT royal with oral) CHD  All cause mortality (30%)  Osteoporosis, Fx  Colorectal Ca (6/10,000)     Stroke  Dementia     Start 10 years or greater after menopause, >59 yo  VTE (11/10,000 woman years)  Breast Ca (E&P but not E alone, <1/1000)  Stroke  CHD  Dementia Osteoporosis, Fx  Colorectal Ca (6/10,000)   All cause mortality       Return to clinic in as needed.     Rosetta Lafleur MD  [facility], Ob/Gyn  12/09/2024 2:43 PM      Again, thank you for allowing me to participate in the care of your patient.      Sincerely,    Rosetta Lafleur MD

## 2024-12-09 NOTE — PROGRESS NOTES
"[facility]  Post-Operative Visit    S: Denies vaginal bleeding. Voiding spontaneously, passing flatus and BM, pain is well controlled, tolerating food without nausea/vomiting, ambulating independently. Had some drainage from umbilical incision initially but has now resolved.    Has history of lymphedema. Edema from surgery resolved, but now has suddenly gained ~7lbs of weight. Unsure why as she has been ambulating a lot (even more than prior to surgery as her pain is now much improved).    O: BP (!) 146/83 (BP Location: Left arm, Patient Position: Sitting, Cuff Size: Adult Large)   Pulse 93   Ht 1.6 m (5' 3\")   Wt 104 kg (229 lb 3.2 oz)   LMP 10/06/2024 (Within Days)   BMI 40.60 kg/m      Gen: Well-appearing, NAD  Cardio: Well-perfused  Pulm: Breathing comfortably  Abd: S/NT/ND, incisions well-approximated w/out drainage or erythema  Extrem: 1+ edema, nontender    Pelvic exam: SSE with intact vaginal cuff, normal appearing vaginal vault. Digital exam with intact vaginal cuff palpated.    Labs  Pathology (10/25/2024)  Final Diagnosis   Uterus, cervix, bilateral ovaries and fallopian tubes, robotic assisted total laparoscopic hysterectomy and bilateral salpingo-oophorectomy:  -Late secretory endometrium  -Myometrial leiomyoma (3.2 cm)  -Unremarkable uterine serosa  -Unremarkable cervix  -LEFT ovarian follicular cyst (4.2 cm) and corpus luteum  -RIGHT ovarian cystic follicles  -LEFT fallopian tube with paratubal cyst  -Unremarkable RIGHT fallopian tube   -Negative for malignancy          A/P:  Damaris Sarmiento is a 49 year old  here for 6 week post-operative visit following RA-TLH, BSO.    Post-operative visit  - Bleeding is stable, patient is doing well  - Okay to return to normal activity. Nothing in the vagina for 8 weeks.    Weight gain  - TSH ordered to evaluate for thyroid dysfunction as cause of weight gain  - Continue to monitor over next few weeks. If persistent, will place PCP referral to " discuss this further.    Surgical menopause  - Has history of subclavian vein thrombosis which was provoked due to thoracic outlet syndrome. No recommendation for lifelong anticoagulation as the risk resolved with resection of first rib. No contraindications for HRT  - Discussed risks of menopause as listed below and patient would like to proceed. She does not have a uterus so okay for estrogen only therapy. Will plan for vivelle-dot 0.025mcg 2x/weekly and increase dose q3-4 weeks pending symptoms  - We discussed non-hormonal options and life-style changes as well.  - Midlife packet given.      Hormone Therapy Increased Risk Decreased Risk No Change in Risk     Start within 10  years of menopause,   <59 yo    VTE (11/10,000 woman years)  Breast Ca (E&P but not E alone <1/1000)  GB Disease (47/10,000 EPT royal with oral) CHD  All cause mortality (30%)  Osteoporosis, Fx  Colorectal Ca (6/10,000)     Stroke  Dementia     Start 10 years or greater after menopause, >59 yo  VTE (11/10,000 woman years)  Breast Ca (E&P but not E alone, <1/1000)  Stroke  CHD  Dementia Osteoporosis, Fx  Colorectal Ca (6/10,000)   All cause mortality       Return to clinic in as needed.     Rosetta Lafleur MD  [facility], Ob[facility]  12/09/2024 2:43 PM

## 2024-12-10 ENCOUNTER — MYC MEDICAL ADVICE (OUTPATIENT)
Dept: OBGYN | Facility: CLINIC | Age: 49
End: 2024-12-10
Payer: COMMERCIAL

## 2024-12-11 ENCOUNTER — TELEPHONE (OUTPATIENT)
Dept: OBGYN | Facility: CLINIC | Age: 49
End: 2024-12-11
Payer: COMMERCIAL

## 2024-12-11 NOTE — TELEPHONE ENCOUNTER
Disability and Leave forms received and placed in [facility]'s incoming bin for Dr. Dunn's review and signature.

## 2024-12-12 ENCOUNTER — TELEPHONE (OUTPATIENT)
Dept: OTHER | Facility: CLINIC | Age: 49
End: 2024-12-12

## 2024-12-12 ENCOUNTER — OFFICE VISIT (OUTPATIENT)
Dept: OTHER | Facility: CLINIC | Age: 49
End: 2024-12-12
Attending: INTERNAL MEDICINE
Payer: COMMERCIAL

## 2024-12-12 VITALS
DIASTOLIC BLOOD PRESSURE: 83 MMHG | WEIGHT: 229 LBS | SYSTOLIC BLOOD PRESSURE: 137 MMHG | OXYGEN SATURATION: 100 % | BODY MASS INDEX: 40.57 KG/M2 | HEART RATE: 82 BPM

## 2024-12-12 DIAGNOSIS — M79.89 LEFT LEG SWELLING: ICD-10-CM

## 2024-12-12 DIAGNOSIS — I87.1 VENOUS THORACIC OUTLET SYNDROME OF RIGHT SUBCLAVIAN VEIN: Primary | ICD-10-CM

## 2024-12-12 PROCEDURE — 99213 OFFICE O/P EST LOW 20 MIN: CPT | Performed by: INTERNAL MEDICINE

## 2024-12-12 NOTE — TELEPHONE ENCOUNTER
Follow up to 12/12/2024 OV with Dr. Hidalgo      Routing to scheduling to coordinate the following:  US UPPER EXTREMITY VENOUS BILATERAL  US AORTA,ILIAC LIMITED BILAT  US LOWER EXTREMITY VENOUS COMP BILAT  RETURN VASCULAR PATIENT consult with Dr. Hidalgo  Please schedule the Ultrasounds in the next week to 9 days; and then an in person visit with Dr. Hidalgo on Dec. 23rd at 2:10 PM      Appt note:  Follow up to 12/12/2024 OV with Dr. Hidalgo

## 2024-12-12 NOTE — TELEPHONE ENCOUNTER
Placed in Dr. Lafleur's box to be completed per Patient's request. Last OV with Dr. Lafleur on 12/9/24. Dr. Dunn on Leave.

## 2024-12-12 NOTE — PROGRESS NOTES
[facility]        Patient is here for a follow up.    Pt is currently taking Aspirin.    /83 (BP Location: Left arm, Patient Position: Sitting, Cuff Size: Adult Large)   Pulse 82   Wt 229 lb (103.9 kg)   LMP 10/06/2024 (Within Days)   SpO2 100%   BMI 40.57 kg/m      The provider has been notified that the patient has concerns of swelling in chest.     Questions patient would like addressed today are: N/A.    Refills are needed: N/A    Has homecare services and agency name:  Sun Jasmine MA

## 2024-12-12 NOTE — PROGRESS NOTES
VASCULAR MEDICINE ASSESSMENT  REFERRAL SOURCE: DR. MERARI JOSEPH  REASON FOR CONSULT: S/p R rib resection for TOS in 2000; now w/ chronic RUE lymphedema/chronic swelling      A/P:     (G54.0) TOS (thoracic outlet syndrome)  (primary encounter diagnosis)  Comment: I explained to her that I would not look to undertake intervention or surgical revision unless she has thrombosis in the area presently. Check below imaging to rule out recurrent DVT off of AC, RTC one week later.  Plan: US upper Extremity Venous Duplex Bilateral             (I89.0) Lymphedema  Comment: we would look to manage the above symptomatically and conservatively with LE therapy.  Plan: Lymphedema Therapy Referral if no DVT        (M79.89) Left leg swelling  Comment: No obvious DVT on exam.  Plan: Check venous comp study, iliac venous duplex. RTC 12/23//2024                 The longitudinal care of plan for Damaris was addressed during this visit. Due to added complexity of care, we will continue to support Damaris Sarmiento  and the subsequent management of these conditions and with ongoing continuity of care for these conditions.       49 minutes total medical care on today's date.        HPI: Damaris Sarmiento is a 49 year old female who in 2000 developed a RUE DVT which was ultimately found to be due to venous TOS. She underwent partial first rib resection, was treated with warfarin for 6 months, and then discontinued it. She notes perpetual RUE swelling , congestion, and discomfort since the original procedure. A 9/22/2016 arterial and venous duplex with TOS maneuvers found the patient to have dampening of axillary artery and vein waveforms with arm extension. She was seen consultatively at [facility] by Dr. Toth who also dxd her with lymphedema. This has responded very well to MLD. She has used a FlexiTouch pump to successfully manage this at home. . She has no known recurrence of thrombosis.    She is seeing me today to address recent recurrent  RUE swelling as well as intermittent LLE swelling. She has not had tense taut extremities such as when she had her RUE DVT. She presents wondering what the status of her venous anatomy is.      Review Of Systems  Skin: negative  Eyes: negative  Ears/Nose/Throat: negative  Respiratory: No shortness of breath, dyspnea on exertion, cough, or hemoptysis  Cardiovascular: negative  Gastrointestinal: negative  Genitourinary: negative  Musculoskeletal: as above  Neurologic: negative  Psychiatric: negative  Hematologic/Lymphatic/Immunologic: negative  Endocrine: negative      PAST MEDICAL HISTORY:                  Past Medical History:   Diagnosis Date    Alternating constipation and diarrhea 08/20/2022    Asthma     Discoid lupus     Diverticulosis of large intestine 06/2021    FOUND SCREENING SCOPE, TRANSVERSE/DESCENDING    Dysmenorrhea 08/20/2022    GERD (gastroesophageal reflux disease) 10/04/2012    Menorrhagia with regular cycle 07/26/2019    Polycystic ovarian syndrome     PONV (postoperative nausea and vomiting)     POTS (postural orthostatic tachycardia syndrome) 08/2021    RW ALLERGIES/IMMUNOLOGY (ABSTRACTED)     Sinusitis     Subclavian vein thrombosis, right (H) 2000    Thoracic outlet syndrome associated with cervical rib 08/20/2022       PAST SURGICAL HISTORY:                  Past Surgical History:   Procedure Laterality Date    BIOPSY      Sinus surgery 7/2014; Flex Sigmoidoscopy 2013    CYSTOSCOPY N/A 10/25/2024    Procedure: CYSTOSCOPY;  Surgeon: Joslyn Dunn MD;  Location:  OR    DAVINCI HYSTERECTOMY TOTAL, BILATERAL SALPINGO-OOPHORECTOMY, COMBINED N/A 10/25/2024    Procedure: HYSTERECTOMY, TOTAL, ROBOT-ASSISTED, LAPAROSCOPIC, WITH BILATERAL SALPINGO-OOPHORECTOMY AND;  Surgeon: Joslyn Dunn MD;  Location:  OR    ENDOSCOPIC SINUS SURGERY  07/16/2014    Procedure: ENDOSCOPIC SINUS SURGERY;  Surgeon: Suzi Vieyra MD;  Location: [facility]    ENT SURGERY  July 2014    bilateral  etmoodectomy&maxillary anstronomy,turbinate resect    EP STUDY TILT TABLE N/A 08/20/2021    Procedure: EP TILT TABLE;  Surgeon: Ilya Gilmore MD;  Location:  HEART CARDI[facility] CATH LAB    EYE SURGERY  [id number]    PRK LASIK, Left eye May2924, Right Eye May2015    ORTHOPEDIC SURGERY      SOFT TISSUE SURGERY  [id number]    Several venoplasty balloon-R subclavian vein    THORACIC SURGERY  12/2000    resection of R first rib-subclavian vein clot thoracic outle    TURBINECTOMY  07/16/2014    Procedure: TURBINECTOMY;  Surgeon: Suzi Vieyra MD;  Location: [facility] NONSPECIFIC PROCEDURE  12/01/2000    resection of first rib    [facility] NONSPECIFIC PROCEDURE  01/01/1992    left foot surgery       CURRENT MEDICATIONS:                  Current Outpatient Medications   Medication Sig Dispense Refill    acetaminophen (TYLENOL) 650 MG CR tablet Take 1,300 mg by mouth 2 times daily.      BABY ASPIRIN PO Take 81 mg by mouth daily.      budesonide (ENTOCORT EC) 3 MG EC capsule       budesonide (RINOCORT AQUA) 32 MCG/ACT nasal spray Spray 1 spray into both nostrils daily      CLARITIN 10 MG OR TABS Take 10 mg by mouth 2 times daily.      clobetasol (TEMOVATE) 0.05 % external ointment APPLY TOPICALLY TO THE AFFECTED AREA TWICE DAILY FOR 14 DAYS AS NEEDED FOR FLARE      EPINEPHrine (ANY BX GENERIC EQUIV) 0.3 MG/0.3ML injection 2-pack INJECT 1 PEN IN THE MUSCLE ONE TIME AS DIRECTED      estradiol (VIVELLE-DOT) 0.025 MG/24HR bi-weekly patch Place 1 patch over 96 hours onto the skin twice a week. 30 patch 3    famotidine (PEPCID) 20 MG tablet Take 20 mg by mouth daily.      fludrocortisone (FLORINEF) 0.1 MG tablet Take 0.1 mg by mouth daily.      fluocinonide (LIDEX) 0.05 % external ointment Apply topically as needed.      ketorolac tromethamine (ACULAR-LS) 0.4 % SOLN ophthalmic solution Place 1 drop into both eyes daily.      ketotifen (ZADITOR/REFRESH ANTI-ITCH) 0.025 % SOLN ophthalmic solution Place 1 drop into both eyes  daily.      LANsoprazole (PREVACID) 15 MG DR capsule Take 15 mg by mouth daily. (Patient not taking: Reported on 11/19/2024)      levocetirizine (XYZAL) 5 MG tablet Take 5 mg by mouth 2 times daily.      Lidocaine (LIDOCARE) 4 % Patch Place 1 patch onto the skin every evening.      mupirocin (BACTROBAN) 2 % ointment Apply topically as needed.  0    polyethylene glycol (MIRALAX) 17 GM/Dose powder Take 1 capful by mouth daily      pseudoePHEDrine (SUDAFED) 120 MG 12 hr tablet Take 120 mg by mouth every 24 hours.      SODIUM CHLORIDE-SODIUM BICARB NA Spray in nostril. [place] Pot      tacrolimus (PROTOPIC) 0.1 % external ointment Apply topically as needed.      triamcinolone (KENALOG) 0.1 % external cream Apply topically as needed.      triamcinolone (NASACORT ALLERGY 24HR) 55 MCG/ACT nasal aerosol Spray 1 Units in nostril every 24 hours      UNABLE TO FIND Take by mouth 2 times daily. MEDICATION NAME: Salt Stick Vitassium      vitamin B-12 (CYANOCOBALAMIN) 1000 MCG tablet Take 1,000 mcg by mouth every other day.      vitamin D3 (CHOLECALCIFEROL) 2000 units (50 mcg) tablet Take 1 tablet by mouth daily.      XOLAIR 150 MG/ML SOSY injection Inject 150 mg subcutaneously every 28 days.         ALLERGIES:                  Allergies   Allergen Reactions    Adhesive Tape Hives    Cat Hair Extract Hives    Dogs Hives     wheezing    Dust Mites      Other reaction(s): Wheezing  eye mucous    Erythromycin     Gluten Meal      Other reaction(s): GI intolerance    Grass Hives     eye mucous    No Clinical Screening - See Comments Hives, Itching and Photosensitivity    Oxycodone      Other reaction(s): Nausea, cramps, dizzyness    Pollen Extract      Other reaction(s): Other (see comments)  Tree pollen-hives, eye mucous    Ragweeds Hives     eye mucous    Red Dye #40 (Allura Red) Hives    Trees      Other reaction(s): Other (see comments)  Sinus issues    Wheat Hives     Other reaction(s): GI intolerance       SOCIAL HISTORY:                   Social History     Socioeconomic History    Marital status:      Spouse name: JAMES    Number of children: Not on file    Years of education: Not on file    Highest education level: Not on file   Occupational History    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 No   Social History Narrative    Not on file     Social Drivers of Health     Financial Resource Strain: Low Risk  (8/11/2024)    Financial Resource Strain     Within the past 12 months, have you or your family members you live with been unable to get utilities (heat, electricity) when it was really needed?: No   Food Insecurity: Low Risk  (8/11/2024)    Food Insecurity     Within the past 12 months, did you worry that your food would run out before you got money to buy more?: No     Within the past 12 months, did the food you bought just not last and you didn t have money to get more?: No   Transportation Needs: Low Risk  (8/11/2024)    Transportation Needs     Within the past 12 months, has lack of transportation kept you from medical appointments, getting your medicines, non-medical meetings or appointments, work, or from getting things that you need?: No   Physical Activity: Insufficiently Active (8/11/2024)    Exercise Vital Sign     Days of Exercise per Week: 4 days     Minutes of Exercise per Session: 30 min   Stress: No Stress Concern Present (8/11/2024)    Citizen of the [place] of Occupational Health - Occupational Stress Questionnaire     Feeling of  Stress : Only a little   Social Connections: Unknown (8/11/2024)    Social Connection and Isolation Panel [NHANES]     Frequency of Communication with Friends and Family: Not on file     Frequency of Social Gatherings with Friends and Family: Once a week     Attends Orthodoxy Services: Not on file     Active Member of Clubs or Organizations: Not on file     Attends Club or Organization Meetings: Not on file     Marital Status: Not on file   Interpersonal Safety: Low Risk  (10/25/2024)    Interpersonal Safety     Do you feel physically and emotionally safe where you currently live?: Yes     Within the past 12 months, have you been hit, slapped, kicked or otherwise physically hurt by someone?: No     Within the past 12 months, have you been humiliated or emotionally abused in other ways by your partner or ex-partner?: No   Housing Stability: Low Risk  (8/11/2024)    Housing Stability     Do you have housing? : Yes     Are you worried about losing your housing?: No       FAMILY HISTORY:                   Family History   Problem Relation Age of Onset    Osteoporosis Mother     Musculoskeletal Disorder Father         Paget's disease     Hyperlipidemia Father     Coronary Artery Disease Father     Allergies Sister     Other - See Comments Sister         endometriosis    Cerebrovascular Disease Sister 47        stroke after recent ocp start for perimenopausal sx    Cancer Maternal Grandmother         lung    Other Cancer Maternal Grandmother         Lung Cancer    Osteoporosis Maternal Grandmother     EYE* Maternal Grandfather         glaucoma    Cancer - colorectal Paternal Grandfather     Prostate Cancer Paternal Grandfather     Colon Cancer Paternal Grandfather     Musculoskeletal Disorder Paternal Uncle         Paget's disease    Breast Cancer Paternal Cousin 55        Second cousin    Cerebrovascular Disease Paternal Cousin 44         Physical exam Reveals:    O/P: WNL  HEENT: WNL  NECK: No JVD, thyromegaly, or  lymphadenopathy; positive BUE SQ venus collaterals worse on the right  HEART: RRR, no murmurs, gallops, or rubs  LUNGS: CTA bilaterally without rales, wheezes, or rhonchi  GI: NABS, nondistended, nontender, soft  EXT:without cyanosis, clubbing, or edema  NEURO: nonfocal  : no flank tenderness         All relevant labs and imaging reviewed by myself on today's date.

## 2024-12-20 ENCOUNTER — HOSPITAL ENCOUNTER (OUTPATIENT)
Dept: ULTRASOUND IMAGING | Facility: CLINIC | Age: 49
Discharge: HOME | End: 2024-12-20
Attending: INTERNAL MEDICINE
Payer: COMMERCIAL

## 2024-12-20 ENCOUNTER — ANCILLARY PROCEDURE (OUTPATIENT)
Dept: ULTRASOUND IMAGING | Facility: CLINIC | Age: 49
End: 2024-12-20
Attending: INTERNAL MEDICINE
Payer: COMMERCIAL

## 2024-12-20 DIAGNOSIS — M79.89 LEFT LEG SWELLING: ICD-10-CM

## 2024-12-20 DIAGNOSIS — I87.1 VENOUS THORACIC OUTLET SYNDROME OF RIGHT SUBCLAVIAN VEIN: ICD-10-CM

## 2024-12-20 PROCEDURE — 93971 EXTREMITY STUDY: CPT

## 2024-12-20 PROCEDURE — 93978 VASCULAR STUDY: CPT

## 2024-12-20 PROCEDURE — 93970 EXTREMITY STUDY: CPT | Performed by: SURGERY

## 2024-12-23 ENCOUNTER — OFFICE VISIT (OUTPATIENT)
Dept: OTHER | Facility: CLINIC | Age: 49
End: 2024-12-23
Attending: INTERNAL MEDICINE
Payer: COMMERCIAL

## 2024-12-23 VITALS
HEART RATE: 85 BPM | DIASTOLIC BLOOD PRESSURE: 80 MMHG | SYSTOLIC BLOOD PRESSURE: 150 MMHG | OXYGEN SATURATION: 100 % | WEIGHT: 230 LBS | BODY MASS INDEX: 40.74 KG/M2

## 2024-12-23 DIAGNOSIS — I87.2 VENOUS (PERIPHERAL) INSUFFICIENCY: ICD-10-CM

## 2024-12-23 DIAGNOSIS — M79.89 LEFT LEG SWELLING: Primary | ICD-10-CM

## 2024-12-23 PROCEDURE — G2211 COMPLEX E/M VISIT ADD ON: HCPCS | Performed by: INTERNAL MEDICINE

## 2024-12-23 PROCEDURE — 99215 OFFICE O/P EST HI 40 MIN: CPT | Performed by: INTERNAL MEDICINE

## 2024-12-23 PROCEDURE — 99213 OFFICE O/P EST LOW 20 MIN: CPT | Performed by: INTERNAL MEDICINE

## 2024-12-23 NOTE — PROGRESS NOTES
[facility]        Patient is here for a  follow up.    Pt is currently taking Aspirin.    BP (!) 150/80 (BP Location: Left arm, Patient Position: Chair, Cuff Size: Adult Large)   Pulse 85   Wt 230 lb (104.3 kg)   LMP 10/06/2024 (Within Days)   SpO2 100%   BMI 40.74 kg/m      The provider has been notified that the patient has no concerns.     Questions patient would like addressed today are: N/A.    Refills are needed: N/A    Has homecare services and agency name:  Sun Gutiérrez MA

## 2024-12-23 NOTE — PROGRESS NOTES
VASCULAR MEDICINE ASSESSMENT  REFERRAL SOURCE: DR. MERARI JOSEPH  REASON FOR CONSULT: S/p R rib resection for TOS in 2000; now w/ chronic RUE lymphedema/chronic swelling     A/P:     (G54.0) TOS (thoracic outlet syndrome)  (primary encounter diagnosis)  Comment: I explained to her that I would not look to undertake intervention or surgical revision unless she has thrombosis in the area presently. US upper Extremity Venous Duplex Bilateral revealed no  DVT off of AC.  Plan: Stay off of AC.              (I89.0) Lymphedema  Comment: we would look to manage the above symptomatically and conservatively with LE therapy.  Plan: Lymphedema Therapy Referral if no DVT           (M79.89) Left leg swelling  Comment: No obvious DVT on exam. Venous comp study revealed no BLE DVT. She has RLE CFV, SFJ, GSV (thigh to calf) incompetence, and LLE mid thigh to calf GSV and mid calf SSV incompetence. No May Thurner anatomy.   Plan: Wear thigh high compression hosiery. See Rx.                The longitudinal care of plan for Damaris was addressed during this visit. Due to added complexity of care, we will continue to support Damaris Sarmiento  and the subsequent management of these conditions and with ongoing continuity of care for these conditions.         39 minutes total medical care on today's date.         HPI: Damaris Sarmiento is a 49 year old female who in 2000 developed a RUE DVT which was ultimately found to be due to venous TOS. She underwent partial first rib resection, was treated with warfarin for 6 months, and then discontinued it. She notes perpetual RUE swelling , congestion, and discomfort since the original procedure. A 9/22/2016 arterial and venous duplex with TOS maneuvers found the patient to have dampening of axillary artery and vein waveforms with arm extension. She was seen consultatively at [facility] by Dr. Toth who also dxd her with lymphedema. This has responded very well to MLD. She has used a FlexiTouch pump  to successfully manage this at home. She has no known recurrence of thrombosis.     She is seeing me today to address recent recurrent RUE swelling as well as intermittent LLE swelling. She has not had tense taut extremities such as when she had her RUE DVT. She presents wondering what the status of her venous anatomy is.         Review Of Systems  Skin: negative  Eyes: negative  Ears/Nose/Throat: negative  Respiratory: No shortness of breath, dyspnea on exertion, cough, or hemoptysis  Cardiovascular: negative  Gastrointestinal: negative  Genitourinary: negative  Musculoskeletal: as above  Neurologic: negative  Psychiatric: negative  Hematologic/Lymphatic/Immunologic: negative  Endocrine: negative      PAST MEDICAL HISTORY:                  Past Medical History:   Diagnosis Date    Alternating constipation and diarrhea 08/20/2022    Asthma     Discoid lupus     Diverticulosis of large intestine 06/2021    FOUND SCREENING SCOPE, TRANSVERSE/DESCENDING    Dysmenorrhea 08/20/2022    GERD (gastroesophageal reflux disease) 10/04/2012    Menorrhagia with regular cycle 07/26/2019    Polycystic ovarian syndrome     PONV (postoperative nausea and vomiting)     POTS (postural orthostatic tachycardia syndrome) 08/2021    RW ALLERGIES/IMMUNOLOGY (ABSTRACTED)     Sinusitis     Subclavian vein thrombosis, right (H) 2000    Thoracic outlet syndrome associated with cervical rib 08/20/2022       PAST SURGICAL HISTORY:                  Past Surgical History:   Procedure Laterality Date    BIOPSY      Sinus surgery 7/2014; Flex Sigmoidoscopy 2013    CYSTOSCOPY N/A 10/25/2024    Procedure: CYSTOSCOPY;  Surgeon: Joslyn Dunn MD;  Location: [facility]    DAVINCI HYSTERECTOMY TOTAL, BILATERAL SALPINGO-OOPHORECTOMY, COMBINED N/A 10/25/2024    Procedure: HYSTERECTOMY, TOTAL, ROBOT-ASSISTED, LAPAROSCOPIC, WITH BILATERAL SALPINGO-OOPHORECTOMY AND;  Surgeon: Joslyn Dunn MD;  Location: [facility] OR    ENDOSCOPIC SINUS SURGERY   07/16/2014    Procedure: ENDOSCOPIC SINUS SURGERY;  Surgeon: Suzi Vieyra MD;  Location: [facility]    ENT SURGERY  July 2014    bilateral etmoodectomy&maxillary anstronomy,turbinate resect    EP STUDY TILT TABLE N/A 08/20/2021    Procedure: EP TILT TABLE;  Surgeon: Ilya Gilmore MD;  Location:  HE[facility] CARDI[facility] CATH LAB    EYE SURGERY  3447-8076    PRK LASIK, Left eye May2924, Right Eye May2015    ORTHOPEDIC SURGERY      SOFT TISSUE SURGERY  [id number]    Several venoplasty balloon-R subclavian vein    THORACIC SURGERY  12/2000    resection of R first rib-subclavian vein clot thoracic outle    TURBINECTOMY  07/16/2014    Procedure: TURBINECTOMY;  Surgeon: Suzi Vieyra MD;  [facility]: [facility] NONSPECIFIC PROCEDURE  12/01/2000    resection of first rib    [facility] NONSPECIFIC PROCEDURE  01/01/1992    left foot surgery       CURRENT MEDICATIONS:                  Current Outpatient Medications   Medication Sig Dispense Refill    acetaminophen (TYLENOL) 650 MG CR tablet Take 1,300 mg by mouth 2 times daily.      BABY ASPIRIN PO Take 81 mg by mouth daily.      budesonide (ENTOCORT EC) 3 MG EC capsule       budesonide (RINOCORT AQUA) 32 MCG/ACT nasal spray Spray 1 spray into both nostrils daily      CLARITIN 10 MG OR TABS Take 10 mg by mouth 2 times daily.      clobetasol (TEMOVATE) 0.05 % external ointment APPLY TOPICALLY TO THE AFFECTED AREA TWICE DAILY FOR 14 DAYS AS NEEDED FOR FLARE      EPINEPHrine (ANY BX GENERIC EQUIV) 0.3 MG/0.3ML injection 2-pack INJECT 1 PEN IN THE MUSCLE ONE TIME AS DIRECTED      estradiol (VIVELLE-DOT) 0.025 MG/24HR bi-weekly patch Place 1 patch over 96 hours onto the skin twice a week. 30 patch 3    famotidine (PEPCID) 20 MG tablet Take 20 mg by mouth daily.      fludrocortisone (FLORINEF) 0.1 MG tablet Take 0.1 mg by mouth daily.      fluocinonide (LIDEX) 0.05 % external ointment Apply topically as needed.      ketorolac tromethamine (ACULAR-LS) 0.4 % SOLN ophthalmic solution  Place 1 drop into both eyes daily.      ketotifen (ZADITOR/REFRESH ANTI-ITCH) 0.025 % SOLN ophthalmic solution Place 1 drop into both eyes daily.      LANsoprazole (PREVACID) 15 MG DR capsule Take 15 mg by mouth daily.      levocetirizine (XYZAL) 5 MG tablet Take 5 mg by mouth 2 times daily.      Lidocaine (LIDOCARE) 4 % Patch Place 1 patch onto the skin every evening.      mupirocin (BACTROBAN) 2 % ointment Apply topically as needed.  0    polyethylene glycol (MIRALAX) 17 GM/Dose powder Take 1 capful by mouth daily      pseudoePHEDrine (SUDAFED) 120 MG 12 hr tablet Take 120 mg by mouth every 24 hours.      SODIUM CHLORIDE-SODIUM BICARB NA Spray in nostril. Ericka Pot      tacrolimus (PROTOPIC) 0.1 % external ointment Apply topically as needed.      triamcinolone (KENALOG) 0.1 % external cream Apply topically as needed.      triamcinolone (NASACORT ALLERGY 24HR) 55 MCG/ACT nasal aerosol Spray 1 Units in nostril every 24 hours      UNABLE TO FIND Take by mouth 2 times daily. MEDICATION NAME: Salt Stick Vitassium      vitamin B-12 (CYANOCOBALAMIN) 1000 MCG tablet Take 1,000 mcg by mouth every other day.      vitamin D3 (CHOLECALCIFEROL) 2000 units (50 mcg) tablet Take 1 tablet by mouth daily.      XOLAIR 150 MG/ML SOSY injection Inject 150 mg subcutaneously every 28 days.         ALLERGIES:                  Allergies   Allergen Reactions    Adhesive Tape Hives    Cat Hair Extract Hives    Dogs Hives     wheezing    Dust Mites      Other reaction(s): Wheezing  eye mucous    Erythromycin     Gluten Meal      Other reaction(s): GI intolerance    Grass Hives     eye mucous    No Clinical Screening - See Comments Hives, Itching and Photosensitivity    Oxycodone      Other reaction(s): Nausea, cramps, dizzyness    Pollen Extract      Other reaction(s): Other (see comments)  Tree pollen-hives, eye mucous    Ragweeds Hives     eye mucous    Red Dye #40 (Allura Red) Hives    Trees      Other reaction(s): Other (see  comments)  Sinus issues    Wheat Hives     Other reaction(s): GI intolerance       SOCIAL HISTORY:                  Social History     Socioeconomic History    Marital status:      Spouse name: JAMES    Number of children: Not on file    Years of education: Not on file    Highest education level: Not on file   Occupational History    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 No   Social History Narrative    Not on file     Social Drivers of Health     Financial Resource Strain: Low Risk  (8/11/2024)    Financial Resource Strain     Within the past 12 months, have you or your family members you live with been unable to get utilities (heat, electricity) when it was really needed?: No   Food Insecurity: Low Risk  (8/11/2024)    Food Insecurity     Within the past 12 months, did you worry that your food would run out before you got money to buy more?: No     Within the past 12 months, did the food you bought just not last and you didn t have money to get more?: No   Transportation Needs: Low Risk  (8/11/2024)    Transportation Needs     Within the past 12 months, has lack of transportation kept you from medical appointments, getting your medicines, non-medical meetings or appointments, work, or from getting things that you need?: No   Physical Activity: Insufficiently Active (8/11/2024)    Exercise Vital Sign     Days of Exercise per Week: 4 days     Minutes of Exercise per Session: 30 min   Stress: No Stress Concern Present  (8/11/2024)    [place] [employer] - Occupational Stress Questionnaire     Feeling of Stress : Only a little   Social Connections: Unknown (8/11/2024)    Social Connection and Isolation Panel [NHANES]     Frequency of Communication with Friends and Family: Not on file     Frequency of Social Gatherings with Friends and Family: Once a week     Attends Mor[employer] Services: Not on file     Active Member of Clubs or Organizations: Not on file     Attends Club or Organization Meetings: Not on file     Marital Status: Not on file   Interpersonal Safety: Low Risk  (10/25/2024)    Interpersonal Safety     Do you feel physically and emotionally safe where you currently live?: Yes     Within the past 12 months, have you been hit, slapped, kicked or otherwise physically hurt by someone?: No     Within the past 12 months, have you been humiliated or emotionally abused in other ways by your partner or ex-partner?: No   Housing Stability: Low Risk  (8/11/2024)    Housing Stability     Do you have housing? : Yes     Are you worried about losing your housing?: No       FAMILY HISTORY:                   Family History   Problem Relation Age of Onset    Osteoporosis Mother     Musculoskeletal Disorder Father         Paget's disease     Hyperlipidemia Father     Coronary Artery Disease Father     Allergies Sister     Other - See Comments Sister         endometriosis    Cerebrovascular Disease Sister 47        stroke after recent ocp start for perimenopausal sx    Cancer Maternal Grandmother         lung    Other Cancer Maternal Grandmother         Lung Cancer    Osteoporosis Maternal Grandmother     EYE* Maternal Grandfather         glaucoma    Cancer - colorectal Paternal Grandfather     Prostate Cancer Paternal Grandfather     Colon Cancer Paternal Grandfather     Musculoskeletal Disorder Paternal Uncle         Paget's disease    Breast Cancer Paternal Cousin 55        Second cousin    Cerebrovascular Disease  Paternal Cousin 44           Physical exam Reveals:    O/P: WNL  HEENT: WNL  NECK: No JVD, thyromegaly, or lymphadenopathy  HEART: RRR, no murmurs, gallops, or rubs  LUNGS: CTA bilaterally without rales, wheezes, or rhonchi  GI: NABS, nondistended, nontender, soft  EXT:without cyanosis, clubbing, or edema  NEURO: nonfocal  : no flank tenderness           All relevant labs and imaging reviewed by myself on today's date.

## 2025-01-27 ENCOUNTER — OFFICE VISIT (OUTPATIENT)
Dept: OBGYN | Facility: CLINIC | Age: 50
End: 2025-01-27
Attending: OBSTETRICS & GYNECOLOGY
Payer: COMMERCIAL

## 2025-01-27 VITALS
DIASTOLIC BLOOD PRESSURE: 80 MMHG | SYSTOLIC BLOOD PRESSURE: 133 MMHG | WEIGHT: 232.5 LBS | HEART RATE: 87 BPM | BODY MASS INDEX: 41.19 KG/M2

## 2025-01-27 DIAGNOSIS — Z79.890 HORMONE REPLACEMENT THERAPY: Primary | ICD-10-CM

## 2025-01-27 PROCEDURE — 99213 OFFICE O/P EST LOW 20 MIN: CPT | Performed by: OBSTETRICS & GYNECOLOGY

## 2025-01-27 RX ORDER — OMALIZUMAB 300 MG/2ML
INJECTION, SOLUTION SUBCUTANEOUS
COMMUNITY
Start: 2024-12-17

## 2025-01-27 RX ORDER — ESTRADIOL 0.04 MG/D
1 PATCH, EXTENDED RELEASE TRANSDERMAL
Qty: 24 PATCH | Refills: 3 | Status: SHIPPED | OUTPATIENT
Start: 2025-01-27

## 2025-01-27 RX ORDER — ESTRADIOL 0.1 MG/G
1 CREAM VAGINAL
Qty: 42.5 G | Refills: 2 | Status: SHIPPED | OUTPATIENT
Start: 2025-01-27

## 2025-01-27 NOTE — LETTER
1/27/2025       RE: Damaris Sarmiento  [address]  [place] [address]     Dear Colleague,    Thank you for referring your patient, Damaris Sarmiento, to the [facility] [place] at [facility]. Please see a copy of my visit note below.    Post op, very happy doing well. Occ HF still, but imporved on low dose estrogen.      Wondering about  higher dose.    Happy with abd/pelvic pain improvement. Bowels much better since surgery. Overall so happy.     /80   Pulse 87   Wt 105.5 kg (232 lb 8 oz)   LMP 10/06/2024 (Within Days)   BMI 41.19 kg/m      Path benign  Incisions well healed at 6 wks.      A/p: 48 yo surgically  female. Happy with surgery results. Some Vasomotor HF continue.     Increase HRT today to next dose.  F/up annually.     Joslyn Dunn MD, FACOG  (she/her/hers)    Department of Ob/Gyn/Women's Health  [facility]  [place] [facility]  [address] [place], [place] [address]. [place] [address]  [email or web address]  p. [phone]  f. [phone]    Again, thank you for allowing me to participate in the care of your patient.      Sincerely,    Joslyn Dunn MD

## 2025-01-27 NOTE — PATIENT INSTRUCTIONS
Thank you for trusting us with your care!   Please be aware, if you are on Mychart, you may see your results prior to your providers review. If labs are abnormal, we will call or message you on iWeebot with a follow up plan.    If you need to contact us for questions about:  Symptoms, Scheduling & Medical Questions; Non-urgent (2-3 day response) Pango message, Urgent (needing response today) [phone] (if after 3:30pm next day response)   Prescriptions: Please call your Pharmacy   Billing: Velma [phone] or [facility]:[phone]

## 2025-01-28 NOTE — PROGRESS NOTES
Post op, very happy doing well. Occ HF still, but imporved on low dose estrogen.      Wondering about  higher dose.    Happy with abd/pelvic pain improvement. Bowels much better since surgery. Overall so happy.     /80   Pulse 87   Wt 105.5 kg (232 lb 8 oz)   LMP 10/06/2024 (Within Days)   BMI 41.19 kg/m      Path benign  Incisions well healed at 6 wks.      A/p: 50 yo surgically  female. Happy with surgery results. Some Vasomotor HF continue.     Increase HRT today to next dose.  F/up annually.     Joslyn Dunn MD, FACOG  (she/her/hers)    Department of Ob/Gyn/Women's Health  [facility]  [place] [facility]  [address] [place] [address]  [email or web address]  p. [phone]  f. [phone]

## 2025-02-07 ENCOUNTER — THERAPY VISIT (OUTPATIENT)
Dept: OCCUPATIONAL THERAPY | Facility: CLINIC | Age: 50
End: 2025-02-07
Payer: COMMERCIAL

## 2025-02-07 DIAGNOSIS — I87.1 VENOUS THORACIC OUTLET SYNDROME OF RIGHT SUBCLAVIAN VEIN: ICD-10-CM

## 2025-02-07 DIAGNOSIS — I89.0 LYMPHEDEMA OF ARM: ICD-10-CM

## 2025-02-07 DIAGNOSIS — I89.0 LYMPHEDEMA: Primary | ICD-10-CM

## 2025-02-07 DIAGNOSIS — Z86.718 HISTORY OF DVT (DEEP VEIN THROMBOSIS): ICD-10-CM

## 2025-02-07 PROCEDURE — 97140 MANUAL THERAPY 1/> REGIONS: CPT | Mod: GO

## 2025-04-16 ENCOUNTER — MYC MEDICAL ADVICE (OUTPATIENT)
Dept: FAMILY MEDICINE | Facility: CLINIC | Age: 50
End: 2025-04-16
Payer: COMMERCIAL

## 2025-05-10 ENCOUNTER — MYC MEDICAL ADVICE (OUTPATIENT)
Dept: OBGYN | Facility: CLINIC | Age: 50
End: 2025-05-10
Payer: COMMERCIAL

## 2025-05-10 ENCOUNTER — MYC REFILL (OUTPATIENT)
Dept: OBGYN | Facility: CLINIC | Age: 50
End: 2025-05-10
Payer: COMMERCIAL

## 2025-05-10 ENCOUNTER — MYC REFILL (OUTPATIENT)
Dept: FAMILY MEDICINE | Facility: CLINIC | Age: 50
End: 2025-05-10
Payer: COMMERCIAL

## 2025-05-10 DIAGNOSIS — Z79.890 HORMONE REPLACEMENT THERAPY: ICD-10-CM

## 2025-05-10 DIAGNOSIS — E89.40 SURGICAL MENOPAUSE: ICD-10-CM

## 2025-05-10 DIAGNOSIS — Z79.890 HORMONE REPLACEMENT THERAPY: Primary | ICD-10-CM

## 2025-05-10 DIAGNOSIS — R19.8 ALTERNATING CONSTIPATION AND DIARRHEA: ICD-10-CM

## 2025-05-10 DIAGNOSIS — L90.0 LICHEN SCLEROSUS: Primary | ICD-10-CM

## 2025-05-12 RX ORDER — TRIAMCINOLONE ACETONIDE 1 MG/G
CREAM TOPICAL 2 TIMES DAILY
Qty: 160 G | Refills: 2 | Status: SHIPPED | OUTPATIENT
Start: 2025-05-12

## 2025-05-14 RX ORDER — ESTRADIOL 0.1 MG/G
CREAM VAGINAL
Qty: 42.5 G | Refills: 2 | Status: SHIPPED | OUTPATIENT
Start: 2025-05-15

## 2025-05-14 NOTE — TELEPHONE ENCOUNTER
Refill request received for Estrace vaginal cream.     Last seen in clinic with Dr. Dunn on 1/27/2025. The Rx is prescribed to place 1 gram vaginally two times per week.    Patient notes: Wondering if we should up the refill quantity- I have found that 2 g @2x/week is helping better with urgency and itchiness.     Routed to provider.

## 2025-05-15 NOTE — TELEPHONE ENCOUNTER
Patient inquirin) if HRT dose should be increased; 2) adding lab orders to monitor for osteoporosis and cardiac changes; 3) how to help with sleep disruption and low libido; 4) diarrhea is improved but having constipation and motility issues now.    Patient was last seen 2025 with Dr. Dunn. Her estradiol patch was increased at this visit from 0.025 to 0.0375.     No hx of GI referral. Patient sees PCP, Dr. Tobar.     Routed to provider.

## 2025-05-20 DIAGNOSIS — E89.40 SURGICAL MENOPAUSE: ICD-10-CM

## 2025-05-20 DIAGNOSIS — Z79.890 HORMONE REPLACEMENT THERAPY: ICD-10-CM

## 2025-05-20 RX ORDER — ESTRADIOL 0.05 MG/D
1 PATCH, EXTENDED RELEASE TRANSDERMAL
Qty: 24 PATCH | Refills: 0 | Status: SHIPPED | OUTPATIENT
Start: 2025-05-22

## 2025-05-27 RX ORDER — ESTRADIOL 0.05 MG/D
PATCH, EXTENDED RELEASE TRANSDERMAL
Qty: 25 PATCH | OUTPATIENT
Start: 2025-05-27

## 2025-05-27 NOTE — TELEPHONE ENCOUNTER
24 patches is 90 days supply, pharmacy does not prefer to break apart boxes of medications and prefers to give out 8 at a time. Messaged Damaris this information over YeahMobi.

## 2025-06-05 ENCOUNTER — PATIENT OUTREACH (OUTPATIENT)
Dept: CARE COORDINATION | Facility: CLINIC | Age: 50
End: 2025-06-05
Payer: COMMERCIAL

## 2025-07-09 ENCOUNTER — TRANSFERRED RECORDS (OUTPATIENT)
Dept: HEALTH INFORMATION MANAGEMENT | Facility: CLINIC | Age: 50
End: 2025-07-09

## 2025-07-09 PROCEDURE — 87077 CULTURE AEROBIC IDENTIFY: CPT | Mod: ORL | Performed by: OTOLARYNGOLOGY

## 2025-07-10 ENCOUNTER — LAB REQUISITION (OUTPATIENT)
Dept: LAB | Facility: CLINIC | Age: 50
End: 2025-07-10
Payer: COMMERCIAL

## 2025-07-10 DIAGNOSIS — J32.9 CHRONIC SINUSITIS, UNSPECIFIED: ICD-10-CM

## 2025-07-14 LAB
BACTERIA SPEC CULT: ABNORMAL

## 2025-08-22 SDOH — HEALTH STABILITY: PHYSICAL HEALTH: ON AVERAGE, HOW MANY DAYS PER WEEK DO YOU ENGAGE IN MODERATE TO STRENUOUS EXERCISE (LIKE A BRISK WALK)?: 6 DAYS

## 2025-08-22 SDOH — HEALTH STABILITY: PHYSICAL HEALTH: ON AVERAGE, HOW MANY MINUTES DO YOU ENGAGE IN EXERCISE AT THIS LEVEL?: 40 MIN

## 2025-08-25 ENCOUNTER — OFFICE VISIT (OUTPATIENT)
Dept: FAMILY MEDICINE | Facility: CLINIC | Age: 50
End: 2025-08-25
Payer: COMMERCIAL

## 2025-08-25 VITALS
HEART RATE: 87 BPM | SYSTOLIC BLOOD PRESSURE: 122 MMHG | BODY MASS INDEX: 43.39 KG/M2 | RESPIRATION RATE: 16 BRPM | HEIGHT: 62 IN | DIASTOLIC BLOOD PRESSURE: 79 MMHG | OXYGEN SATURATION: 100 % | TEMPERATURE: 98 F | WEIGHT: 235.8 LBS

## 2025-08-25 DIAGNOSIS — Z13.220 SCREENING FOR HYPERLIPIDEMIA: ICD-10-CM

## 2025-08-25 DIAGNOSIS — Z13.1 SCREENING FOR DIABETES MELLITUS: ICD-10-CM

## 2025-08-25 DIAGNOSIS — Z00.00 ROUTINE GENERAL MEDICAL EXAMINATION AT A HEALTH CARE FACILITY: ICD-10-CM

## 2025-08-25 DIAGNOSIS — Z12.4 CERVICAL CANCER SCREENING: Primary | ICD-10-CM

## 2025-08-25 LAB
ANION GAP SERPL CALCULATED.3IONS-SCNC: 10 MMOL/L (ref 7–15)
BASOPHILS # BLD AUTO: 0.06 10E3/UL (ref 0–0.2)
BASOPHILS NFR BLD AUTO: 0.6 %
BUN SERPL-MCNC: 9.6 MG/DL (ref 6–20)
CALCIUM SERPL-MCNC: 9.9 MG/DL (ref 8.8–10.4)
CHLORIDE SERPL-SCNC: 104 MMOL/L (ref 98–107)
CHOLEST SERPL-MCNC: 245 MG/DL
CREAT SERPL-MCNC: 0.75 MG/DL (ref 0.51–0.95)
EGFRCR SERPLBLD CKD-EPI 2021: >90 ML/MIN/1.73M2
EOSINOPHIL # BLD AUTO: 0.35 10E3/UL (ref 0–0.7)
EOSINOPHIL NFR BLD AUTO: 3.3 %
ERYTHROCYTE [DISTWIDTH] IN BLOOD BY AUTOMATED COUNT: 15.7 % (ref 10–15)
EST. AVERAGE GLUCOSE BLD GHB EST-MCNC: 114 MG/DL
FASTING STATUS PATIENT QL REPORTED: YES
FASTING STATUS PATIENT QL REPORTED: YES
GLUCOSE SERPL-MCNC: 97 MG/DL (ref 70–99)
HBA1C MFR BLD: 5.6 % (ref 0–5.6)
HCO3 SERPL-SCNC: 26 MMOL/L (ref 22–29)
HCT VFR BLD AUTO: 36.1 % (ref 35–47)
HDLC SERPL-MCNC: 70 MG/DL
HGB BLD-MCNC: 11.7 G/DL (ref 11.7–15.7)
IMM GRANULOCYTES # BLD: <0.04 10E3/UL
IMM GRANULOCYTES NFR BLD: 0.2 %
LDLC SERPL CALC-MCNC: 146 MG/DL
LYMPHOCYTES # BLD AUTO: 3.45 10E3/UL (ref 0.8–5.3)
LYMPHOCYTES NFR BLD AUTO: 32.8 %
MCH RBC QN AUTO: 26.9 PG (ref 26.5–33)
MCHC RBC AUTO-ENTMCNC: 32.4 G/DL (ref 31.5–36.5)
MCV RBC AUTO: 83 FL (ref 78–100)
MONOCYTES # BLD AUTO: 0.78 10E3/UL (ref 0–1.3)
MONOCYTES NFR BLD AUTO: 7.4 %
NEUTROPHILS # BLD AUTO: 5.85 10E3/UL (ref 1.6–8.3)
NEUTROPHILS NFR BLD AUTO: 55.7 %
NONHDLC SERPL-MCNC: 175 MG/DL
PLATELET # BLD AUTO: 365 10E3/UL (ref 150–450)
POTASSIUM SERPL-SCNC: 5.2 MMOL/L (ref 3.4–5.3)
RBC # BLD AUTO: 4.35 10E6/UL (ref 3.8–5.2)
SODIUM SERPL-SCNC: 140 MMOL/L (ref 135–145)
TRIGL SERPL-MCNC: 146 MG/DL
WBC # BLD AUTO: 10.51 10E3/UL (ref 4–11)

## 2025-08-25 PROCEDURE — 3074F SYST BP LT 130 MM HG: CPT | Performed by: INTERNAL MEDICINE

## 2025-08-25 PROCEDURE — 1126F AMNT PAIN NOTED NONE PRSNT: CPT | Performed by: INTERNAL MEDICINE

## 2025-08-25 PROCEDURE — 80061 LIPID PANEL: CPT | Performed by: INTERNAL MEDICINE

## 2025-08-25 PROCEDURE — 85025 COMPLETE CBC W/AUTO DIFF WBC: CPT | Performed by: INTERNAL MEDICINE

## 2025-08-25 PROCEDURE — 90471 IMMUNIZATION ADMIN: CPT | Performed by: INTERNAL MEDICINE

## 2025-08-25 PROCEDURE — 3044F HG A1C LEVEL LT 7.0%: CPT | Performed by: INTERNAL MEDICINE

## 2025-08-25 PROCEDURE — 99396 PREV VISIT EST AGE 40-64: CPT | Mod: 25 | Performed by: INTERNAL MEDICINE

## 2025-08-25 PROCEDURE — 83036 HEMOGLOBIN GLYCOSYLATED A1C: CPT | Performed by: INTERNAL MEDICINE

## 2025-08-25 PROCEDURE — 36415 COLL VENOUS BLD VENIPUNCTURE: CPT | Performed by: INTERNAL MEDICINE

## 2025-08-25 PROCEDURE — 80048 BASIC METABOLIC PNL TOTAL CA: CPT | Performed by: INTERNAL MEDICINE

## 2025-08-25 PROCEDURE — 90677 PCV20 VACCINE IM: CPT | Performed by: INTERNAL MEDICINE

## 2025-08-25 PROCEDURE — 3078F DIAST BP <80 MM HG: CPT | Performed by: INTERNAL MEDICINE

## 2025-08-25 ASSESSMENT — PAIN SCALES - GENERAL: PAINLEVEL_OUTOF10: NO PAIN (0)

## 2025-08-27 ENCOUNTER — MYC MEDICAL ADVICE (OUTPATIENT)
Dept: FAMILY MEDICINE | Facility: CLINIC | Age: 50
End: 2025-08-27
Payer: COMMERCIAL

## (undated) DEVICE — SU MONOCRYL 4-0 PS-2 18" UND Y496G

## (undated) DEVICE — ESU GROUND PAD ADULT W/CORD E7507

## (undated) DEVICE — DAVINCI XI MONOPOLAR SCISSORS HOT SHEARS 8MM 470179

## (undated) DEVICE — ENDO TROCAR CONMED AIRSEAL BLADELESS 08X120MM IAS8-120LP

## (undated) DEVICE — UTERINE MANIPULATOR RUMI 6.7MMX8CM UMB678

## (undated) DEVICE — WIPES FOLEY CARE SURESTEP PROVON DFC100

## (undated) DEVICE — SYR 10ML LL W/O NDL 302995

## (undated) DEVICE — GLOVE BIOGEL PI ULTRATOUCH G SZ 6.5 42165

## (undated) DEVICE — NDL INSUFFLATION 13GA 120MM C2201

## (undated) DEVICE — PREP DYNA-HEX 4% CHG SCRUB 4OZ BOTTLE MDS098710

## (undated) DEVICE — LIGHT HANDLE X1 31140133

## (undated) DEVICE — Device

## (undated) DEVICE — DAVINCI HOT SHEARS TIP COVER  400180

## (undated) DEVICE — TUBING FILTER TRI-LUMEN AIRSEAL ASC-EVAC1

## (undated) DEVICE — SYRINGE 10ML FILL SALINE FLUSH STERILE 306553

## (undated) DEVICE — ADAPTER DRAPE ALLY AU-AD

## (undated) DEVICE — DAVINCI XI SEAL UNIVERSAL 5-12MM 470500

## (undated) DEVICE — SU DERMABOND ADVANCED .7ML DNX12

## (undated) DEVICE — LINEN TOWEL PACK X30 5481

## (undated) DEVICE — PAD PERI INDIV WRAP 11" 2022A

## (undated) DEVICE — CATH TRAY FOLEY SURESTEP 16FR WDRAIN BAG STLK LATEX A300316A

## (undated) DEVICE — ANTIFOG SOLUTION SEE SHARP 150M TROCAR SWABS 30978 (COI)

## (undated) DEVICE — SOL NACL 0.9% INJ 1000ML BAG 2B1324X

## (undated) DEVICE — DRAPE UNDER BUTTOCK 8483

## (undated) DEVICE — TUBING IRRIG CYSTO/BLADDER SET 81" LF 2C4040

## (undated) DEVICE — GLOVE BIOGEL PI MICRO INDICATOR UNDERGLOVE SZ 7.0 48970

## (undated) DEVICE — LINEN GOWN X4 5410

## (undated) DEVICE — BLADE KNIFE SURG 11 371111

## (undated) DEVICE — TUBING SUCTION MEDI-VAC SOFT 3/16"X20' N520A

## (undated) DEVICE — COVER CAMERA IN-LIGHT DISP LT-C02

## (undated) DEVICE — DAVINCI XI DRIVER NDL LARGE 8MM EXT 471006

## (undated) DEVICE — SUCTION MANIFOLD NEPTUNE 2 SYS 4 PORT 0702-020-000

## (undated) DEVICE — CUFF FINGER CLEARSIGHT MED CSCM

## (undated) DEVICE — KIT POSITIONER PINK PAD XL ADVANCED 40588

## (undated) DEVICE — BLADE SWITCH SCISSORS TIP 5MM 89-5100B

## (undated) DEVICE — STABILIZER ARCH KOH-EFFICIENT 4.0CM KC-ARCH-40

## (undated) DEVICE — SYR 50ML LL W/O NDL 309653

## (undated) DEVICE — DAVINCI XI DRAPE ARM 470015

## (undated) DEVICE — SU STRATAFIX PDS PLUS 0 CT-1 30CM SXPP1B450

## (undated) DEVICE — DAVINCI XI DRAPE COLUMN 470341

## (undated) DEVICE — DAVINCI XI ESU FORCE BIPOLAR 8MM 471405

## (undated) DEVICE — DAVINCI XI OBTURATOR BLADELESS 8MM 470359

## (undated) RX ORDER — SODIUM CHLORIDE 9 MG/ML
INJECTION, SOLUTION INTRAVENOUS
Status: DISPENSED
Start: 2021-08-20

## (undated) RX ORDER — FENTANYL CITRATE 50 UG/ML
INJECTION, SOLUTION INTRAMUSCULAR; INTRAVENOUS
Status: DISPENSED
Start: 2024-10-25

## (undated) RX ORDER — CEFAZOLIN SODIUM/WATER 2 G/20 ML
SYRINGE (ML) INTRAVENOUS
Status: DISPENSED
Start: 2024-10-25

## (undated) RX ORDER — ONDANSETRON 2 MG/ML
INJECTION INTRAMUSCULAR; INTRAVENOUS
Status: DISPENSED
Start: 2024-10-25

## (undated) RX ORDER — APREPITANT 40 MG/1
CAPSULE ORAL
Status: DISPENSED
Start: 2024-10-25

## (undated) RX ORDER — FENTANYL CITRATE-0.9 % NACL/PF 10 MCG/ML
PLASTIC BAG, INJECTION (ML) INTRAVENOUS
Status: DISPENSED
Start: 2024-10-25

## (undated) RX ORDER — PHENAZOPYRIDINE HYDROCHLORIDE 200 MG/1
TABLET, FILM COATED ORAL
Status: DISPENSED
Start: 2024-10-25

## (undated) RX ORDER — METRONIDAZOLE 500 MG/100ML
INJECTION, SOLUTION INTRAVENOUS
Status: DISPENSED
Start: 2024-10-25

## (undated) RX ORDER — HYDROMORPHONE HYDROCHLORIDE 1 MG/ML
INJECTION, SOLUTION INTRAMUSCULAR; INTRAVENOUS; SUBCUTANEOUS
Status: DISPENSED
Start: 2024-10-25

## (undated) RX ORDER — PROPOFOL 10 MG/ML
INJECTION, EMULSION INTRAVENOUS
Status: DISPENSED
Start: 2024-10-25

## (undated) RX ORDER — DEXAMETHASONE SODIUM PHOSPHATE 4 MG/ML
INJECTION, SOLUTION INTRA-ARTICULAR; INTRALESIONAL; INTRAMUSCULAR; INTRAVENOUS; SOFT TISSUE
Status: DISPENSED
Start: 2024-10-25

## (undated) RX ORDER — DIAZEPAM 10 MG/2ML
INJECTION, SOLUTION INTRAMUSCULAR; INTRAVENOUS
Status: DISPENSED
Start: 2024-10-25

## (undated) RX ORDER — ACETAMINOPHEN 325 MG/1
TABLET ORAL
Status: DISPENSED
Start: 2024-10-25

## (undated) RX ORDER — KETOROLAC TROMETHAMINE 30 MG/ML
INJECTION, SOLUTION INTRAMUSCULAR; INTRAVENOUS
Status: DISPENSED
Start: 2024-10-25